# Patient Record
Sex: FEMALE | Race: WHITE | Employment: OTHER | URBAN - METROPOLITAN AREA
[De-identification: names, ages, dates, MRNs, and addresses within clinical notes are randomized per-mention and may not be internally consistent; named-entity substitution may affect disease eponyms.]

---

## 2017-01-26 RX ORDER — ESTRADIOL AND NORETHINDRONE ACETATE 1; .5 MG/1; MG/1
TABLET ORAL
Qty: 90 TABLET | Refills: 1 | Status: SHIPPED | OUTPATIENT
Start: 2017-01-26 | End: 2017-06-01 | Stop reason: SDUPTHER

## 2017-01-26 RX ORDER — VALACYCLOVIR HYDROCHLORIDE 500 MG/1
500 TABLET, FILM COATED ORAL DAILY
Qty: 90 TABLET | Refills: 1 | Status: SHIPPED | OUTPATIENT
Start: 2017-01-26 | End: 2017-06-01 | Stop reason: SDUPTHER

## 2017-01-30 RX ORDER — ESTRADIOL AND NORETHINDRONE ACETATE 1; .5 MG/1; MG/1
TABLET, FILM COATED ORAL
Qty: 28 TABLET | Refills: 5 | Status: SHIPPED | OUTPATIENT
Start: 2017-01-30 | End: 2017-06-01 | Stop reason: SDUPTHER

## 2017-06-01 ENCOUNTER — TELEPHONE (OUTPATIENT)
Dept: OBGYN | Age: 62
End: 2017-06-01

## 2017-06-01 ENCOUNTER — OFFICE VISIT (OUTPATIENT)
Dept: OBGYN | Age: 62
End: 2017-06-01

## 2017-06-01 VITALS
SYSTOLIC BLOOD PRESSURE: 118 MMHG | WEIGHT: 153 LBS | BODY MASS INDEX: 24.59 KG/M2 | HEIGHT: 66 IN | DIASTOLIC BLOOD PRESSURE: 72 MMHG

## 2017-06-01 DIAGNOSIS — Z12.31 SCREENING MAMMOGRAM, ENCOUNTER FOR: ICD-10-CM

## 2017-06-01 DIAGNOSIS — N84.1 CERVICAL POLYP: ICD-10-CM

## 2017-06-01 DIAGNOSIS — Z78.0 POSTMENOPAUSAL: ICD-10-CM

## 2017-06-01 DIAGNOSIS — Z01.419 WOMEN'S ANNUAL ROUTINE GYNECOLOGICAL EXAMINATION: Primary | ICD-10-CM

## 2017-06-01 DIAGNOSIS — Z11.51 SCREENING FOR HUMAN PAPILLOMAVIRUS: ICD-10-CM

## 2017-06-01 LAB — PAP SMEAR: NORMAL

## 2017-06-01 PROCEDURE — 99396 PREV VISIT EST AGE 40-64: CPT | Performed by: OBSTETRICS & GYNECOLOGY

## 2017-06-01 PROCEDURE — 11100 PR BIOPSY OF SKIN LESION: CPT | Performed by: OBSTETRICS & GYNECOLOGY

## 2017-06-01 RX ORDER — MONTELUKAST SODIUM 10 MG/1
TABLET ORAL
COMMUNITY
Start: 2017-05-30

## 2017-06-04 RX ORDER — ESTRADIOL AND NORETHINDRONE ACETATE 1; .5 MG/1; MG/1
TABLET ORAL
Qty: 90 TABLET | Refills: 3 | Status: SHIPPED | OUTPATIENT
Start: 2017-06-04 | End: 2018-06-05 | Stop reason: SDUPTHER

## 2017-06-04 RX ORDER — VALACYCLOVIR HYDROCHLORIDE 500 MG/1
500 TABLET, FILM COATED ORAL DAILY
Qty: 90 TABLET | Refills: 3 | Status: SHIPPED | OUTPATIENT
Start: 2017-06-04 | End: 2018-06-11 | Stop reason: SDUPTHER

## 2017-06-04 ASSESSMENT — ENCOUNTER SYMPTOMS
RESPIRATORY NEGATIVE: 1
ALLERGIC/IMMUNOLOGIC NEGATIVE: 1
RECTAL PAIN: 0
CONSTIPATION: 0
NAUSEA: 0
ABDOMINAL DISTENTION: 0
BLOOD IN STOOL: 0
ANAL BLEEDING: 0
DIARRHEA: 0
VOMITING: 0
EYES NEGATIVE: 1
ABDOMINAL PAIN: 0

## 2017-06-12 ENCOUNTER — HOSPITAL ENCOUNTER (OUTPATIENT)
Dept: WOMENS IMAGING | Age: 62
Discharge: HOME | End: 2017-06-12
Payer: COMMERCIAL

## 2017-06-12 DIAGNOSIS — Z12.31 SCREENING MAMMOGRAM, ENCOUNTER FOR: ICD-10-CM

## 2017-06-12 DIAGNOSIS — Z78.0 POSTMENOPAUSAL: ICD-10-CM

## 2017-06-12 PROCEDURE — 77080 DXA BONE DENSITY AXIAL: CPT

## 2017-06-12 PROCEDURE — G0202 SCR MAMMO BI INCL CAD: HCPCS

## 2017-06-13 DIAGNOSIS — R92.8 ABNORMAL MAMMOGRAM OF LEFT BREAST: Primary | ICD-10-CM

## 2017-06-14 ENCOUNTER — TELEPHONE (OUTPATIENT)
Dept: OBGYN | Age: 62
End: 2017-06-14

## 2017-06-16 ENCOUNTER — HOSPITAL ENCOUNTER (OUTPATIENT)
Dept: WOMENS IMAGING | Age: 62
Discharge: HOME | End: 2017-06-16
Payer: COMMERCIAL

## 2017-06-16 ENCOUNTER — HOSPITAL ENCOUNTER (OUTPATIENT)
Dept: ULTRASOUND IMAGING | Age: 62
End: 2017-06-16
Payer: COMMERCIAL

## 2017-06-16 ENCOUNTER — TELEPHONE (OUTPATIENT)
Dept: OBGYN | Age: 62
End: 2017-06-16

## 2017-06-16 DIAGNOSIS — R92.8 ABNORMAL MAMMOGRAM OF LEFT BREAST: Primary | ICD-10-CM

## 2017-06-16 DIAGNOSIS — R92.8 ABNORMAL MAMMOGRAM OF LEFT BREAST: ICD-10-CM

## 2017-06-16 PROCEDURE — G0206 DX MAMMO INCL CAD UNI: HCPCS

## 2017-08-03 ENCOUNTER — TELEPHONE (OUTPATIENT)
Dept: OBGYN | Age: 62
End: 2017-08-03

## 2018-06-05 ENCOUNTER — OFFICE VISIT (OUTPATIENT)
Dept: OBGYN CLINIC | Age: 63
End: 2018-06-05
Payer: COMMERCIAL

## 2018-06-05 VITALS
SYSTOLIC BLOOD PRESSURE: 118 MMHG | HEIGHT: 66 IN | WEIGHT: 157 LBS | DIASTOLIC BLOOD PRESSURE: 78 MMHG | BODY MASS INDEX: 25.23 KG/M2

## 2018-06-05 DIAGNOSIS — Z12.31 SCREENING MAMMOGRAM, ENCOUNTER FOR: ICD-10-CM

## 2018-06-05 DIAGNOSIS — Z01.419 VISIT FOR PELVIC EXAM: Primary | ICD-10-CM

## 2018-06-05 PROCEDURE — G8427 DOCREV CUR MEDS BY ELIG CLIN: HCPCS | Performed by: OBSTETRICS & GYNECOLOGY

## 2018-06-05 PROCEDURE — 3017F COLORECTAL CA SCREEN DOC REV: CPT | Performed by: OBSTETRICS & GYNECOLOGY

## 2018-06-05 PROCEDURE — 1036F TOBACCO NON-USER: CPT | Performed by: OBSTETRICS & GYNECOLOGY

## 2018-06-05 PROCEDURE — 99396 PREV VISIT EST AGE 40-64: CPT | Performed by: OBSTETRICS & GYNECOLOGY

## 2018-06-05 PROCEDURE — G8419 CALC BMI OUT NRM PARAM NOF/U: HCPCS | Performed by: OBSTETRICS & GYNECOLOGY

## 2018-06-05 PROCEDURE — 99213 OFFICE O/P EST LOW 20 MIN: CPT | Performed by: OBSTETRICS & GYNECOLOGY

## 2018-06-05 RX ORDER — ESTRADIOL AND NORETHINDRONE ACETATE 1; .5 MG/1; MG/1
TABLET ORAL
Qty: 90 TABLET | Refills: 3 | Status: SHIPPED | OUTPATIENT
Start: 2018-06-05

## 2018-06-05 ASSESSMENT — ENCOUNTER SYMPTOMS
VOMITING: 0
RECTAL PAIN: 0
ANAL BLEEDING: 0
ALLERGIC/IMMUNOLOGIC NEGATIVE: 1
ABDOMINAL PAIN: 0
EYES NEGATIVE: 1
CONSTIPATION: 0
ABDOMINAL DISTENTION: 0
NAUSEA: 0
RESPIRATORY NEGATIVE: 1
BLOOD IN STOOL: 0
DIARRHEA: 0

## 2018-06-11 RX ORDER — VALACYCLOVIR HYDROCHLORIDE 500 MG/1
TABLET, FILM COATED ORAL
Qty: 90 TABLET | Refills: 3 | Status: SHIPPED | OUTPATIENT
Start: 2018-06-11

## 2018-06-11 RX ORDER — ESTRADIOL AND NORETHINDRONE ACETATE 1; .5 MG/1; MG/1
TABLET, FILM COATED ORAL
Qty: 84 TABLET | Refills: 3 | Status: SHIPPED | OUTPATIENT
Start: 2018-06-11

## 2020-06-30 ENCOUNTER — OFFICE VISIT (OUTPATIENT)
Dept: OBGYN CLINIC | Age: 65
End: 2020-06-30
Payer: MEDICARE

## 2020-06-30 VITALS — BODY MASS INDEX: 24.69 KG/M2 | WEIGHT: 153 LBS | DIASTOLIC BLOOD PRESSURE: 84 MMHG | SYSTOLIC BLOOD PRESSURE: 124 MMHG

## 2020-06-30 PROCEDURE — 4040F PNEUMOC VAC/ADMIN/RCVD: CPT | Performed by: OBSTETRICS & GYNECOLOGY

## 2020-06-30 PROCEDURE — 99213 OFFICE O/P EST LOW 20 MIN: CPT | Performed by: OBSTETRICS & GYNECOLOGY

## 2020-06-30 PROCEDURE — 1090F PRES/ABSN URINE INCON ASSESS: CPT | Performed by: OBSTETRICS & GYNECOLOGY

## 2020-06-30 PROCEDURE — G8399 PT W/DXA RESULTS DOCUMENT: HCPCS | Performed by: OBSTETRICS & GYNECOLOGY

## 2020-06-30 PROCEDURE — 1123F ACP DISCUSS/DSCN MKR DOCD: CPT | Performed by: OBSTETRICS & GYNECOLOGY

## 2020-06-30 PROCEDURE — 1036F TOBACCO NON-USER: CPT | Performed by: OBSTETRICS & GYNECOLOGY

## 2020-06-30 PROCEDURE — G8420 CALC BMI NORM PARAMETERS: HCPCS | Performed by: OBSTETRICS & GYNECOLOGY

## 2020-06-30 PROCEDURE — G8427 DOCREV CUR MEDS BY ELIG CLIN: HCPCS | Performed by: OBSTETRICS & GYNECOLOGY

## 2020-06-30 PROCEDURE — 3017F COLORECTAL CA SCREEN DOC REV: CPT | Performed by: OBSTETRICS & GYNECOLOGY

## 2020-06-30 RX ORDER — PAROXETINE 7.5 MG/1
7.5 CAPSULE ORAL DAILY
Qty: 30 CAPSULE | Refills: 3 | Status: SHIPPED | OUTPATIENT
Start: 2020-06-30

## 2020-06-30 ASSESSMENT — ENCOUNTER SYMPTOMS
VOMITING: 0
ALLERGIC/IMMUNOLOGIC NEGATIVE: 1
NAUSEA: 0
ABDOMINAL PAIN: 0
ANAL BLEEDING: 0
EYES NEGATIVE: 1
ABDOMINAL DISTENTION: 0
RECTAL PAIN: 0
DIARRHEA: 0
RESPIRATORY NEGATIVE: 1
CONSTIPATION: 0
BLOOD IN STOOL: 0

## 2020-06-30 ASSESSMENT — PATIENT HEALTH QUESTIONNAIRE - PHQ9
1. LITTLE INTEREST OR PLEASURE IN DOING THINGS: 0
2. FEELING DOWN, DEPRESSED OR HOPELESS: 0
SUM OF ALL RESPONSES TO PHQ9 QUESTIONS 1 & 2: 0
SUM OF ALL RESPONSES TO PHQ QUESTIONS 1-9: 0
SUM OF ALL RESPONSES TO PHQ QUESTIONS 1-9: 0

## 2020-06-30 NOTE — PROGRESS NOTES
TABLET EVERY DAY 84 tablet 3    valACYclovir (VALTREX) 500 MG tablet TAKE 1 TABLET BY MOUTH DAILY 90 tablet 3    estradiol-norethindrone (MIMVEY) 1-0.5 MG per tablet TAKE 1 TABLET EVERY DAY 90 tablet 3    montelukast (SINGULAIR) 10 MG tablet       hydroxychloroquine (PLAQUENIL) 200 MG tablet Take 200 mg by mouth 2 times daily. 5    sulfaSALAzine (AZULFIDINE) 500 MG tablet Take 500 mg by mouth 2 times daily. 2 tablets twice a day  2    SYMBICORT 160-4.5 MCG/ACT AERO Inhale 2 puffs into the lungs 2 times daily. 2 puffs twice a day  6    thyroid (ARMOUR THYROID) 90 MG tablet Take 90 mg by mouth daily. 1 1/2 gr. Once a day      frovatriptan succinate (FROVA) 2.5 MG TABS Take 2.5 mg by mouth as needed for Migraine.  esomeprazole (NEXIUM) 40 MG capsule Take 40 mg by mouth every morning (before breakfast). 1 or 2 times a day      albuterol (PROVENTIL HFA;VENTOLIN HFA) 108 (90 BASE) MCG/ACT inhaler Inhale 2 puffs into the lungs every 4 hours as needed for Wheezing. As needed      propranolol (INDERAL LA) 80 MG CR capsule Take 80 mg by mouth 2 times daily. 1 capsule, exteneded release 24HR PO twice a day       No current facility-administered medications for this visit.       Social History     Socioeconomic History    Marital status:      Spouse name: Not on file    Number of children: Not on file    Years of education: Not on file    Highest education level: Not on file   Occupational History    Not on file   Social Needs    Financial resource strain: Not on file    Food insecurity     Worry: Not on file     Inability: Not on file    Transportation needs     Medical: Not on file     Non-medical: Not on file   Tobacco Use    Smoking status: Never Smoker    Smokeless tobacco: Never Used   Substance and Sexual Activity    Alcohol use: No    Drug use: No    Sexual activity: Not Currently   Lifestyle    Physical activity     Days per week: Not on file     Minutes per session: Not on file    Stress: Not on file   Relationships    Social connections     Talks on phone: Not on file     Gets together: Not on file     Attends Orthodox service: Not on file     Active member of club or organization: Not on file     Attends meetings of clubs or organizations: Not on file     Relationship status: Not on file    Intimate partner violence     Fear of current or ex partner: Not on file     Emotionally abused: Not on file     Physically abused: Not on file     Forced sexual activity: Not on file   Other Topics Concern    Not on file   Social History Narrative    Not on file        Family History   Problem Relation Age of Onset    Stroke Mother     COPD Father     Emphysema Father     No Known Problems Sister     No Known Problems Brother     No Known Problems Paternal Grandfather     No Known Problems Paternal Grandmother     No Known Problems Maternal Grandmother     No Known Problems Maternal Grandfather     No Known Problems Other     Breast Cancer Neg Hx     Cancer Neg Hx     Colon Cancer Neg Hx     Diabetes Neg Hx     Eclampsia Neg Hx     Hypertension Neg Hx     Ovarian Cancer Neg Hx      Labor Neg Hx     Spont Abortions Neg Hx        Review of Systems   Constitutional: Negative. Negative for activity change, appetite change, chills, diaphoresis, fatigue, fever and unexpected weight change. HENT: Negative. Eyes: Negative. Respiratory: Negative. Cardiovascular: Negative. Gastrointestinal: Negative for abdominal distention, abdominal pain, anal bleeding, blood in stool, constipation, diarrhea, nausea, rectal pain and vomiting. Endocrine: Negative. Genitourinary: Negative for decreased urine volume, difficulty urinating, dyspareunia, dysuria, enuresis, flank pain, frequency, genital sores, hematuria, menstrual problem, pelvic pain, urgency, vaginal bleeding, vaginal discharge and vaginal pain. Musculoskeletal: Negative. Skin: Negative. Allergic/Immunologic: Negative. Neurological: Negative. Hematological: Negative. Psychiatric/Behavioral: Negative. Objective:     Physical Exam  Constitutional:       General: She is not in acute distress. Appearance: She is well-developed. She is not diaphoretic. HENT:      Head: Normocephalic and atraumatic. Eyes:      Conjunctiva/sclera: Conjunctivae normal.   Neck:      Musculoskeletal: Normal range of motion and neck supple. Thyroid: No thyromegaly. Cardiovascular:      Rate and Rhythm: Normal rate and regular rhythm. Heart sounds: Normal heart sounds. Pulmonary:      Effort: Pulmonary effort is normal. No respiratory distress. Breath sounds: Normal breath sounds. No wheezing or rales. Chest:      Chest wall: No tenderness. Breasts:         Right: No mass, nipple discharge, skin change or tenderness. Left: No mass, nipple discharge, skin change or tenderness. Abdominal:      General: Bowel sounds are normal. There is no distension. Palpations: Abdomen is soft. There is no mass. Tenderness: There is no abdominal tenderness. There is no guarding or rebound. Hernia: There is no hernia in the right inguinal area or left inguinal area. Genitourinary:     Labia:         Right: No rash, tenderness, lesion or injury. Left: No rash, tenderness, lesion or injury. Vagina: Normal. No signs of injury and foreign body. No vaginal discharge, erythema, tenderness or bleeding. Cervix: No cervical motion tenderness, discharge or friability. Uterus: Not deviated, not enlarged, not fixed and not tender. Adnexa:         Right: No mass, tenderness or fullness. Left: No mass, tenderness or fullness. Rectum: Normal.   Musculoskeletal: Normal range of motion. General: No tenderness or deformity. Lymphadenopathy:      Cervical: No cervical adenopathy. Skin:     General: Skin is warm and dry.

## 2020-07-01 ENCOUNTER — TELEPHONE (OUTPATIENT)
Dept: OBGYN CLINIC | Age: 65
End: 2020-07-01

## 2020-07-01 NOTE — TELEPHONE ENCOUNTER
----- Message from Octaviano Osler, MD sent at 7/1/2020  1:23 PM EDT -----  Abnormal TSH. Needs to see PCP ASAP.

## 2020-07-03 ENCOUNTER — PATIENT MESSAGE (OUTPATIENT)
Dept: OBGYN CLINIC | Age: 65
End: 2020-07-03

## 2020-07-06 NOTE — TELEPHONE ENCOUNTER
From: Bharti Escobar  To: Claudetta Lazier, MD  Sent: 7/3/2020 9:29 AM EDT  Subject: Test Results Question    Dr. Cindy Flood,  My thyroid test results were faxed over to my family doctor, Dr. Franklin Álvarez because your office called me & said my results are elevated (8.620). However, Dr. Nolvia Mclean office called me back & said the TSH is high but they refer to the T4 result & that one was normal, so there is nothing to worry about since that one is in the normal range. I was hoping that was the reason for me having so many hot flashes & he said that has nothing with having hot flashes. I am a bit confused that I will not be put on any medication to lower the elevation. In addition, the anxiety med you prescribed for me to try (Paroxetine Mesylate) is not available, so they will contact me when it is ready. So far, I have not heard back from them. Please advise.  Thx!

## 2020-07-07 NOTE — TELEPHONE ENCOUNTER
Please let patient know I have to defer all decisions and recommendations on thyroid function and meds to PCP because this is not my specialty. Those questions would need to be directed to her PCP. Sorry to hear meds not available; Covid has slowed production of many meds and hopefully it will be available soon.   Thanks

## 2023-03-15 ENCOUNTER — TELEPHONE (OUTPATIENT)
Dept: PRIMARY CARE | Facility: CLINIC | Age: 68
End: 2023-03-15

## 2023-03-15 NOTE — TELEPHONE ENCOUNTER
Letter to insurance company said that they code amendment fax [phone]  Date of services 12/9/22 for phlebotomy charge, comprehensive panel, TSH, uranalysis.    Thank you

## 2023-03-17 NOTE — TELEPHONE ENCOUNTER
Pt called saying that the lab work she had done was coded wrong, ended up paying when she shouldn't have.  Her insurance told her that they needed a letter faxed to them with the correct codes for phlebotomy, comprehensive panel, TSH and uranalysis from 12/9/22. When her insurance has the fax they told her they would fix it.

## 2023-03-23 NOTE — TELEPHONE ENCOUNTER
Patient said that is was denied because it was coded as routine. They need to be individual diagnoses codes for each test.

## 2023-05-01 ENCOUNTER — TELEPHONE (OUTPATIENT)
Dept: PRIMARY CARE | Facility: CLINIC | Age: 68
End: 2023-05-01

## 2023-05-01 NOTE — TELEPHONE ENCOUNTER
Patient calling back stating that the lab work billing hasn't been corrected yet. Patient gave the fax for lab billing [phone] to send the correction.  Thank you.

## 2023-05-08 NOTE — TELEPHONE ENCOUNTER
Pt calling a second time about this. She wants this fixed because it is about to be sent to collections. Can you please look into this?

## 2023-05-28 DIAGNOSIS — M32.9 SYSTEMIC LUPUS ERYTHEMATOSUS, UNSPECIFIED (MULTI): ICD-10-CM

## 2023-05-30 RX ORDER — SULFASALAZINE 500 MG/1
TABLET ORAL
Qty: 120 TABLET | Refills: 0 | Status: SHIPPED | OUTPATIENT
Start: 2023-05-30 | End: 2023-06-26 | Stop reason: SDUPTHER

## 2023-06-13 ENCOUNTER — HOSPITAL ENCOUNTER (OUTPATIENT)
Dept: DATA CONVERSION | Facility: HOSPITAL | Age: 68
End: 2023-06-13
Attending: INTERNAL MEDICINE | Admitting: INTERNAL MEDICINE
Payer: MEDICARE

## 2023-06-13 DIAGNOSIS — K21.9 GASTRO-ESOPHAGEAL REFLUX DISEASE WITHOUT ESOPHAGITIS: ICD-10-CM

## 2023-06-13 DIAGNOSIS — M41.9 SCOLIOSIS, UNSPECIFIED: ICD-10-CM

## 2023-06-13 DIAGNOSIS — G25.81 RESTLESS LEGS SYNDROME: ICD-10-CM

## 2023-06-13 DIAGNOSIS — E03.9 HYPOTHYROIDISM, UNSPECIFIED: ICD-10-CM

## 2023-06-13 DIAGNOSIS — M34.9 SYSTEMIC SCLEROSIS, UNSPECIFIED (MULTI): ICD-10-CM

## 2023-06-13 DIAGNOSIS — Z98.890 OTHER SPECIFIED POSTPROCEDURAL STATES: ICD-10-CM

## 2023-06-13 DIAGNOSIS — I73.00 RAYNAUD'S SYNDROME WITHOUT GANGRENE: ICD-10-CM

## 2023-06-13 DIAGNOSIS — Z86.19 PERSONAL HISTORY OF OTHER INFECTIOUS AND PARASITIC DISEASES: ICD-10-CM

## 2023-06-13 DIAGNOSIS — R91.8 OTHER NONSPECIFIC ABNORMAL FINDING OF LUNG FIELD: ICD-10-CM

## 2023-06-13 DIAGNOSIS — R13.10 DYSPHAGIA, UNSPECIFIED: ICD-10-CM

## 2023-06-13 DIAGNOSIS — G20.A1 PARKINSON'S DISEASE (MULTI): ICD-10-CM

## 2023-06-13 DIAGNOSIS — K58.1 IRRITABLE BOWEL SYNDROME WITH CONSTIPATION: ICD-10-CM

## 2023-06-13 DIAGNOSIS — G43.909 MIGRAINE, UNSPECIFIED, NOT INTRACTABLE, WITHOUT STATUS MIGRAINOSUS: ICD-10-CM

## 2023-06-13 DIAGNOSIS — M32.9 SYSTEMIC LUPUS ERYTHEMATOSUS, UNSPECIFIED (MULTI): ICD-10-CM

## 2023-06-21 PROBLEM — R14.0 ABDOMINAL BLOATING: Status: ACTIVE | Noted: 2023-06-21

## 2023-06-21 PROBLEM — S40.012D: Status: ACTIVE | Noted: 2023-06-21

## 2023-06-21 PROBLEM — R91.8 PULMONARY NODULES: Status: ACTIVE | Noted: 2023-06-21

## 2023-06-21 PROBLEM — M41.9 SCOLIOSIS: Status: ACTIVE | Noted: 2023-06-21

## 2023-06-21 PROBLEM — G25.81 RLS (RESTLESS LEGS SYNDROME): Status: ACTIVE | Noted: 2023-06-21

## 2023-06-21 PROBLEM — K21.9 GASTROESOPHAGEAL REFLUX DISEASE: Status: ACTIVE | Noted: 2023-06-21

## 2023-06-21 PROBLEM — M99.05 SOMATIC DYSFUNCTION OF PELVIC REGION: Status: ACTIVE | Noted: 2023-06-21

## 2023-06-21 PROBLEM — N32.81 OVERACTIVE BLADDER: Status: ACTIVE | Noted: 2023-06-21

## 2023-06-21 PROBLEM — R13.10 DYSPHAGIA: Status: ACTIVE | Noted: 2023-06-21

## 2023-06-21 PROBLEM — U07.1 COVID-19 VIRUS INFECTION: Status: ACTIVE | Noted: 2023-06-21

## 2023-06-21 PROBLEM — R55 VASOMOTOR INSTABILITY: Status: ACTIVE | Noted: 2023-06-21

## 2023-06-21 PROBLEM — S29.019A STRAIN OF THORACIC REGION: Status: ACTIVE | Noted: 2023-06-21

## 2023-06-21 PROBLEM — R00.2 PALPITATIONS: Status: ACTIVE | Noted: 2023-06-21

## 2023-06-21 PROBLEM — D25.9 FIBROID, UTERINE: Status: ACTIVE | Noted: 2023-06-21

## 2023-06-21 PROBLEM — R60.0 BILATERAL EDEMA OF LOWER EXTREMITY: Status: ACTIVE | Noted: 2023-06-21

## 2023-06-21 PROBLEM — I73.00 RAYNAUD'S SYNDROME: Status: ACTIVE | Noted: 2023-06-21

## 2023-06-21 PROBLEM — E55.9 VITAMIN D DEFICIENCY: Status: ACTIVE | Noted: 2023-06-21

## 2023-06-21 PROBLEM — M32.9 SYSTEMIC LUPUS ERYTHEMATOSUS (MULTI): Status: ACTIVE | Noted: 2023-06-21

## 2023-06-21 PROBLEM — B96.89 ACUTE BACTERIAL BRONCHITIS: Status: ACTIVE | Noted: 2023-06-21

## 2023-06-21 PROBLEM — M99.03 SOMATIC DYSFUNCTION OF LUMBOSACRAL REGION: Status: ACTIVE | Noted: 2023-06-21

## 2023-06-21 PROBLEM — M34.9 SCLERODERMA (MULTI): Status: ACTIVE | Noted: 2023-06-21

## 2023-06-21 PROBLEM — J20.8 ACUTE BACTERIAL BRONCHITIS: Status: ACTIVE | Noted: 2023-06-21

## 2023-06-21 PROBLEM — S40.022D: Status: ACTIVE | Noted: 2023-06-21

## 2023-06-21 PROBLEM — J47.9 BRONCHIECTASIS (MULTI): Status: ACTIVE | Noted: 2023-06-21

## 2023-06-21 PROBLEM — G89.29 CHRONIC LOW BACK PAIN: Status: ACTIVE | Noted: 2023-06-21

## 2023-06-21 PROBLEM — S29.019A THORACIC MYOFASCIAL STRAIN: Status: ACTIVE | Noted: 2023-06-21

## 2023-06-21 PROBLEM — M99.01 CERVICAL SOMATIC DYSFUNCTION: Status: ACTIVE | Noted: 2023-06-21

## 2023-06-21 PROBLEM — M54.50 CHRONIC LOW BACK PAIN: Status: ACTIVE | Noted: 2023-06-21

## 2023-06-21 PROBLEM — M19.90 ARTHRITIS: Status: ACTIVE | Noted: 2023-06-21

## 2023-06-21 PROBLEM — G43.009 MIGRAINE WITHOUT AURA AND WITHOUT STATUS MIGRAINOSUS, NOT INTRACTABLE: Status: ACTIVE | Noted: 2023-06-21

## 2023-06-21 PROBLEM — M35.1 MCTD (MIXED CONNECTIVE TISSUE DISEASE) (MULTI): Status: ACTIVE | Noted: 2023-06-21

## 2023-06-21 PROBLEM — N95.2 ATROPHIC VAGINITIS: Status: ACTIVE | Noted: 2023-06-21

## 2023-06-21 PROBLEM — E78.5 BORDERLINE HYPERLIPIDEMIA: Status: ACTIVE | Noted: 2023-06-21

## 2023-06-21 PROBLEM — J32.9 CHRONIC SINUSITIS: Status: ACTIVE | Noted: 2023-06-21

## 2023-06-21 PROBLEM — K44.9 HERNIA, HIATAL: Status: ACTIVE | Noted: 2023-06-21

## 2023-06-21 PROBLEM — I87.2 VENOUS INSUFFICIENCY (CHRONIC) (PERIPHERAL): Status: ACTIVE | Noted: 2023-06-21

## 2023-06-21 PROBLEM — M54.9 BACK PAIN, CHRONIC: Status: ACTIVE | Noted: 2023-06-21

## 2023-06-21 PROBLEM — K59.09 CHRONIC CONSTIPATION: Status: ACTIVE | Noted: 2023-06-21

## 2023-06-21 PROBLEM — D83.9: Status: ACTIVE | Noted: 2023-06-21

## 2023-06-21 PROBLEM — S20.212S: Status: ACTIVE | Noted: 2023-06-21

## 2023-06-21 PROBLEM — M81.0 SENILE OSTEOPOROSIS: Status: ACTIVE | Noted: 2023-06-21

## 2023-06-21 PROBLEM — R05.3 CHRONIC COUGH: Status: ACTIVE | Noted: 2023-06-21

## 2023-06-21 PROBLEM — G89.29 BACK PAIN, CHRONIC: Status: ACTIVE | Noted: 2023-06-21

## 2023-06-21 RX ORDER — IMMUNE GLOBULIN INFUSION (HUMAN) 100 MG/ML
INJECTION, SOLUTION INTRAVENOUS; SUBCUTANEOUS
COMMUNITY

## 2023-06-21 RX ORDER — IBUPROFEN 600 MG/1
600 TABLET ORAL
COMMUNITY
End: 2024-01-08 | Stop reason: SDUPTHER

## 2023-06-21 RX ORDER — PRAMIPEXOLE DIHYDROCHLORIDE 0.5 MG/1
0.5 TABLET ORAL 3 TIMES DAILY
COMMUNITY
End: 2024-01-15 | Stop reason: SDUPTHER

## 2023-06-21 RX ORDER — PROPRANOLOL HYDROCHLORIDE 80 MG/1
80 CAPSULE, EXTENDED RELEASE ORAL DAILY
COMMUNITY
Start: 2023-03-24 | End: 2023-06-26 | Stop reason: SDUPTHER

## 2023-06-21 RX ORDER — ESTRADIOL 0.1 MG/G
CREAM VAGINAL
COMMUNITY
Start: 2022-12-13 | End: 2023-06-26 | Stop reason: ALTCHOICE

## 2023-06-21 RX ORDER — OMEPRAZOLE 40 MG/1
40 CAPSULE, DELAYED RELEASE ORAL DAILY
COMMUNITY
Start: 2022-12-13 | End: 2024-01-08 | Stop reason: ALTCHOICE

## 2023-06-21 RX ORDER — NIFEDIPINE 30 MG/1
30 TABLET, FILM COATED, EXTENDED RELEASE ORAL DAILY
COMMUNITY
End: 2023-06-26 | Stop reason: SDUPTHER

## 2023-06-21 RX ORDER — HYDROXYCHLOROQUINE SULFATE 200 MG/1
200 TABLET, FILM COATED ORAL DAILY
COMMUNITY
End: 2023-06-26 | Stop reason: SDUPTHER

## 2023-06-21 RX ORDER — RIZATRIPTAN BENZOATE 10 MG/1
10 TABLET ORAL
COMMUNITY
End: 2023-06-26 | Stop reason: SDUPTHER

## 2023-06-26 ENCOUNTER — OFFICE VISIT (OUTPATIENT)
Dept: PRIMARY CARE | Facility: CLINIC | Age: 68
End: 2023-06-26
Payer: MEDICARE

## 2023-06-26 VITALS
HEIGHT: 65 IN | BODY MASS INDEX: 27.66 KG/M2 | HEART RATE: 64 BPM | WEIGHT: 166 LBS | SYSTOLIC BLOOD PRESSURE: 120 MMHG | DIASTOLIC BLOOD PRESSURE: 68 MMHG

## 2023-06-26 DIAGNOSIS — R13.10 DYSPHAGIA, UNSPECIFIED TYPE: ICD-10-CM

## 2023-06-26 DIAGNOSIS — R53.83 FATIGUE, UNSPECIFIED TYPE: ICD-10-CM

## 2023-06-26 DIAGNOSIS — M34.9 SCLERODERMA (MULTI): ICD-10-CM

## 2023-06-26 DIAGNOSIS — K59.09 CHRONIC CONSTIPATION: ICD-10-CM

## 2023-06-26 DIAGNOSIS — E55.9 VITAMIN D DEFICIENCY: ICD-10-CM

## 2023-06-26 DIAGNOSIS — M41.35 THORACOGENIC SCOLIOSIS OF THORACOLUMBAR REGION: ICD-10-CM

## 2023-06-26 DIAGNOSIS — N32.81 OVERACTIVE BLADDER: ICD-10-CM

## 2023-06-26 DIAGNOSIS — I73.00 RAYNAUD'S DISEASE WITHOUT GANGRENE: ICD-10-CM

## 2023-06-26 DIAGNOSIS — J47.0 BRONCHIECTASIS WITH ACUTE LOWER RESPIRATORY INFECTION (MULTI): ICD-10-CM

## 2023-06-26 DIAGNOSIS — M32.9 SYSTEMIC LUPUS ERYTHEMATOSUS, UNSPECIFIED (MULTI): ICD-10-CM

## 2023-06-26 DIAGNOSIS — G43.009 MIGRAINE WITHOUT AURA AND WITHOUT STATUS MIGRAINOSUS, NOT INTRACTABLE: ICD-10-CM

## 2023-06-26 DIAGNOSIS — Z00.00 MEDICARE ANNUAL WELLNESS VISIT, SUBSEQUENT: ICD-10-CM

## 2023-06-26 DIAGNOSIS — R05.3 CHRONIC COUGH: ICD-10-CM

## 2023-06-26 DIAGNOSIS — M35.1 MCTD (MIXED CONNECTIVE TISSUE DISEASE) (MULTI): ICD-10-CM

## 2023-06-26 DIAGNOSIS — Z00.00 ENCOUNTER FOR HEALTH MAINTENANCE EXAMINATION: Primary | ICD-10-CM

## 2023-06-26 DIAGNOSIS — K21.9 GASTROESOPHAGEAL REFLUX DISEASE WITHOUT ESOPHAGITIS: ICD-10-CM

## 2023-06-26 DIAGNOSIS — Z13.220 SCREENING, LIPID: ICD-10-CM

## 2023-06-26 DIAGNOSIS — M81.0 AGE-RELATED OSTEOPOROSIS WITHOUT CURRENT PATHOLOGICAL FRACTURE: ICD-10-CM

## 2023-06-26 PROBLEM — U07.1 COVID-19 VIRUS INFECTION: Status: RESOLVED | Noted: 2023-06-21 | Resolved: 2023-06-26

## 2023-06-26 PROCEDURE — 99497 ADVNCD CARE PLAN 30 MIN: CPT | Performed by: FAMILY MEDICINE

## 2023-06-26 PROCEDURE — 1125F AMNT PAIN NOTED PAIN PRSNT: CPT | Performed by: FAMILY MEDICINE

## 2023-06-26 PROCEDURE — G0446 INTENS BEHAVE THER CARDIO DX: HCPCS | Performed by: FAMILY MEDICINE

## 2023-06-26 PROCEDURE — 1159F MED LIST DOCD IN RCRD: CPT | Performed by: FAMILY MEDICINE

## 2023-06-26 PROCEDURE — 99215 OFFICE O/P EST HI 40 MIN: CPT | Performed by: FAMILY MEDICINE

## 2023-06-26 PROCEDURE — G0439 PPPS, SUBSEQ VISIT: HCPCS | Performed by: FAMILY MEDICINE

## 2023-06-26 PROCEDURE — 93000 ELECTROCARDIOGRAM COMPLETE: CPT | Performed by: FAMILY MEDICINE

## 2023-06-26 PROCEDURE — 1157F ADVNC CARE PLAN IN RCRD: CPT | Performed by: FAMILY MEDICINE

## 2023-06-26 PROCEDURE — 1160F RVW MEDS BY RX/DR IN RCRD: CPT | Performed by: FAMILY MEDICINE

## 2023-06-26 PROCEDURE — 1036F TOBACCO NON-USER: CPT | Performed by: FAMILY MEDICINE

## 2023-06-26 PROCEDURE — 1170F FXNL STATUS ASSESSED: CPT | Performed by: FAMILY MEDICINE

## 2023-06-26 RX ORDER — NIFEDIPINE 30 MG/1
30 TABLET, FILM COATED, EXTENDED RELEASE ORAL DAILY
Qty: 90 TABLET | Refills: 2 | Status: SHIPPED | OUTPATIENT
Start: 2023-06-26 | End: 2024-01-08 | Stop reason: SDUPTHER

## 2023-06-26 RX ORDER — PROPRANOLOL HYDROCHLORIDE 80 MG/1
80 CAPSULE, EXTENDED RELEASE ORAL DAILY
Qty: 90 CAPSULE | Refills: 2 | Status: SHIPPED | OUTPATIENT
Start: 2023-06-26 | End: 2024-01-08 | Stop reason: SDUPTHER

## 2023-06-26 RX ORDER — RIZATRIPTAN BENZOATE 10 MG/1
TABLET ORAL
Qty: 27 TABLET | Refills: 2 | Status: SHIPPED | OUTPATIENT
Start: 2023-06-26 | End: 2024-01-08 | Stop reason: SDUPTHER

## 2023-06-26 RX ORDER — SULFASALAZINE 500 MG/1
1000 TABLET ORAL DAILY
Qty: 180 TABLET | Refills: 2 | Status: SHIPPED | OUTPATIENT
Start: 2023-06-26 | End: 2024-01-08 | Stop reason: SDUPTHER

## 2023-06-26 RX ORDER — PRAMIPEXOLE DIHYDROCHLORIDE 1 MG/1
1 TABLET ORAL NIGHTLY
COMMUNITY
Start: 2023-06-15 | End: 2024-01-15 | Stop reason: SDUPTHER

## 2023-06-26 RX ORDER — HYDROXYCHLOROQUINE SULFATE 200 MG/1
200 TABLET, FILM COATED ORAL DAILY
Qty: 90 TABLET | Refills: 2 | Status: SHIPPED | OUTPATIENT
Start: 2023-06-26 | End: 2024-01-08 | Stop reason: SDUPTHER

## 2023-06-26 ASSESSMENT — ACTIVITIES OF DAILY LIVING (ADL)
DOING_HOUSEWORK: INDEPENDENT
DRESSING: INDEPENDENT
GROCERY_SHOPPING: INDEPENDENT
BATHING: INDEPENDENT
TAKING_MEDICATION: INDEPENDENT
MANAGING_FINANCES: INDEPENDENT

## 2023-06-26 ASSESSMENT — PATIENT HEALTH QUESTIONNAIRE - PHQ9
2. FEELING DOWN, DEPRESSED OR HOPELESS: NOT AT ALL
SUM OF ALL RESPONSES TO PHQ9 QUESTIONS 1 & 2: 0
1. LITTLE INTEREST OR PLEASURE IN DOING THINGS: NOT AT ALL

## 2023-06-26 NOTE — PROGRESS NOTES
Annual Comprehensive Medical Exam    67 y.o. female presents for annual comprehensive medical evaluation and preventive services screening.  No recent hospitalizations, surgeries or significant injuries.    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  Taking Plaquenil and Azulfidine   - Rheumatologist had retired.  Patient was stable with Plaquenil and Azulfidine.  I had agreed to continue prescribing these medications but she would find another rheumatologist if there is a flareup of condition.    Breast CA screening 2/2023  DEXA 2021  Colon cancer screening by gastroenterology    Migraine cephalgia is well controlled with Inderal LA daily.  She uses less than 9 Maxalt per month for abortive therapy.        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)     Anti-pneumococcal polysaccharide antibody deficiency    COVID-19 09/21/2022    COVID-19 virus infection    Dysphagia, unspecified     Dysphagia, idiopathic    Encounter for screening for malignant neoplasm of cervix     Pap smear for cervical cancer screening    Localized edema 05/04/2022    Bilateral edema of lower extremity    Parkinson's disease (CMS/HCC) 06/21/2022    Parkinsonism    Personal history of other diseases of the digestive system 07/18/2018    History of hiatal hernia    Personal history of other diseases of the musculoskeletal system and connective tissue     History of osteoarthritis    Personal history of other diseases of the respiratory system 10/08/2017    History of asthma    Personal history of other endocrine, nutritional and metabolic disease 08/08/2016    History of hyperlipidemia    Personal history of other endocrine, nutritional and metabolic disease 10/08/2017    History of hypothyroidism    Personal history of other malignant neoplasm of skin     History of malignant neoplasm of skin    Personal history of  other medical treatment     History of screening mammography    Personal history of pneumonia (recurrent) 03/18/2020    History of pneumonia due to Pseudomonas    Personal history of pneumonia (recurrent) 01/29/2021    History of pneumonia due to Pseudomonas    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    Raynaud's syndrome without gangrene     Raynaud disease      Past Surgical History:   Procedure Laterality Date    BREAST LUMPECTOMY  06/23/2017    Right Breast Lumpectomy    COLONOSCOPY  07/29/2015    Complete Colonoscopy    GASTRIC FUNDOPLICATION  07/19/2018    Esophagogastric Fundoplasty Nissen Fundoplication    OTHER SURGICAL HISTORY  02/19/2015    Treatment Of Jaw    OTHER SURGICAL HISTORY  06/06/2019    Nissen fundoplication laparoscopic    OTHER SURGICAL HISTORY  06/23/2017    Biopsy Pleural    OTHER SURGICAL HISTORY  06/23/2017    Shoulder Repair    SINUS SURGERY  06/23/2017    Sinus Surgery    TONSILLECTOMY  06/23/2017    Tonsillectomy     Family History   Problem Relation Name Age of Onset    Other (cerebrovascular accident) Mother      COPD Father      Prostate cancer Brother        Social History     Socioeconomic History    Marital status:      Spouse name: Not on file    Number of children: Not on file    Years of education: Not on file    Highest education level: Not on file   Occupational History    Not on file   Tobacco Use    Smoking status: Never     Passive exposure: Never    Smokeless tobacco: Never   Substance and Sexual Activity    Alcohol use: Not Currently    Drug use: Never    Sexual activity: Not on file   Other Topics Concern    Not on file   Social History Narrative    Not on file     Social Determinants of Health     Financial Resource Strain: Not on file   Food Insecurity: Not on file   Transportation Needs: Not on file   Physical Activity: Not on file   Stress: Not on file   Social Connections: Not on file   Intimate Partner  "Violence: Not on file   Housing Stability: Not on file       Current Outpatient Medications on File Prior to Visit   Medication Sig Dispense Refill    hydroxychloroquine (Plaquenil) 200 mg tablet Take 1 tablet (200 mg) by mouth once daily.      ibuprofen 600 mg tablet Take 1 tablet (600 mg) by mouth.      immune globulin, human, (Gammagard Liquid) infusion Infuse monthly as directed      linaCLOtide (Linzess) 145 mcg capsule Take 2 capsules (290 mcg) by mouth once daily.      NIFEdipine CC 30 mg 24 hr tablet Take 1 tablet (30 mg) by mouth once daily.      omeprazole (PriLOSEC) 40 mg DR capsule Take 1 capsule (40 mg) by mouth once daily.      pramipexole (Mirapex) 0.5 mg tablet Take 1 tablet (0.5 mg) by mouth 3 times a day.      pramipexole (Mirapex) 1 mg tablet Take 1 tablet (1 mg) by mouth once daily at bedtime.      propranolol LA (Inderal LA) 80 mg 24 hr capsule Take 1 capsule (80 mg) by mouth once daily.      rizatriptan (Maxalt) 10 mg tablet Take 1 tablet (10 mg) by mouth.      sulfaSALAzine (Azulfidine) 500 mg tablet TAKE 2 TABLETS BY MOUTH TWICE A DAY (Patient taking differently: 2 tabs once a day) 120 tablet 0    [DISCONTINUED] estradiol (Estrace) 0.01 % (0.1 mg/gram) vaginal cream Insert into the vagina.      [DISCONTINUED] propranolol XL (Innopran XL) 80 mg 24 hr capsule Take 1 capsule (80 mg) by mouth once daily.       No current facility-administered medications on file prior to visit.       Allergies   Allergen Reactions    Doxycycline Other and Unknown    Topiramate Other, Nausea Only, Palpitations and Unknown       Complete review of systems is negative today except for that mentioned in the history of present illness.  In particular patient denies chest pain, shortness of breath, headaches and GI disturbances.      Visit Vitals  /68   Pulse 64   Ht 1.651 m (5' 5\")   Wt 75.3 kg (166 lb)   BMI 27.62 kg/m²   Smoking Status Never   BSA 1.86 m²      Physical Exam  Gen.: Alert and oriented ×3 female " in no acute distress.  HEENT: Head is normocephalic.  Pupils equal and reactive to light.  Neck is supple without adenopathy or carotid bruits.  No masses or thyromegaly  Heart: Regular rate and rhythm without murmurs.  Lungs: Clear to auscultation bilaterally.  Extremities: Good range of motion of all joints.  No significant edema. Pedal pulses +1-2/4  Skin: No significant or irregular nevi visualized.  Neuro: No signs of focal neurologic deficit.  No tremor.  Speech and hearing are normal.  DTRs +3/4;  Muscle Strength +5/5.  Musculoskeletal: Spine with good ROM.  Mild visible thoracolumbar scoliosis.  Leg lengths are equal.  Psych: normal affect.  No suicidal ideation.  Good judgement and insight.     The 10-year ASCVD risk score (Georges URBAN, et al., 2019) is: 6.6%    Values used to calculate the score:      Age: 67 years      Sex: Female      Is Non- : No      Diabetic: No      Tobacco smoker: No      Systolic Blood Pressure: 120 mmHg      Is BP treated: No      HDL Cholesterol: 60.2 mg/dL      Total Cholesterol: 197 mg/dL        Diagnosis/Plan  1. Encounter for health maintenance examination  - Comprehensive Metabolic Panel; Future  - CBC; Future  - Lipid Panel; Future  - TSH with reflex to Free T4 if abnormal; Future  - Vitamin D 1,25 Dihydroxy; Future  - Urinalysis with Reflex Microscopic; Future  - ECG 12 lead (Clinic Performed)    2. Medicare annual wellness visit, subsequent  Living Will / Advanced Care Planning:  Discussed advance care planning including explanation and discussion of advanced directives.  Patient does have current up-to-date living will but does not have a power of .  Currently at her  is power of  but patient does not have documents.       3. MCTD (mixed connective tissue disease) (CMS/[facility])  - hydroxychloroquine (Plaquenil) 200 mg tablet; Take 1 tablet (200 mg) by mouth once daily.  Dispense: 90 tablet; Refill: 2    6. Vitamin D deficiency  -  Vitamin D 1,25 Dihydroxy; Future    8. Systemic lupus erythematosus, unspecified (CMS/HCC)  Continue management with Plaquenil and Azulfidine (mixed connective tissue disorder  - sulfaSALAzine (Azulfidine) 500 mg tablet; Take 2 tablets (1,000 mg) by mouth once daily. 2 tabs once a day  Dispense: 180 tablet; Refill: 2    9. Bronchiectasis with acute lower respiratory infection (CMS/[facility])  Recommend pulmonology consultation.  Patient is not using any inhalers at this time.    10. Chronic cough    11. Raynaud's disease without gangrene  - NIFEdipine CC 30 mg 24 hr tablet; Take 1 tablet (30 mg) by mouth once daily.  Dispense: 90 tablet; Refill: 2    12. Chronic constipation  Continue management by gastroenterology    13. Gastroesophageal reflux disease without esophagitis  Continue management by gastroenterology    14. Dysphagia, unspecified type  Continue management by gastroenterology    15. Overactive bladder    16. Thoracogenic scoliosis of thoracolumbar region    17. Scleroderma (CMS/HCC)  Continue Plaquenil and Azulfidine (mixed connective tissue disease)    18. Age-related osteoporosis without current pathological fracture  - XR DEXA bone density; Future    19. Migraine without aura and without status migrainosus, not intractable  - propranolol LA (Inderal LA) 80 mg 24 hr capsule; Take 1 capsule (80 mg) by mouth once daily.  Dispense: 90 capsule; Refill: 2  - rizatriptan (Maxalt) 10 mg tablet; One po at onset of migraine; can repeat in 2 hrs;  max 20mg daily  Dispense: 27 tablet; Refill: 2        Follow up in 6 months for medical management    I will continue to monitor, evaluate, assess and treat all problems/diagnoses as appropriate and continue to collaborate with specialists.    Contact office or send a  MY Chart message with any questions or concerns    Encouraged to sign up with my  My Chart  Patient will only be notified of labs that require medical intervention.    Prescriptions will not be filled  unless you are compliant with your follow up appointments or have a follow up appointment scheduled as per instruction of your physician. Refills should be requested at the time of your visit.    **Charting was completed using voice recognition technology and may include unintended errors**    Miguel Alvarado DO, [facility]  [address]  [place] [address] [phone]

## 2023-07-28 ENCOUNTER — INITIAL CONSULT (OUTPATIENT)
Dept: SPORTS MEDICINE | Age: 68
End: 2023-07-28
Payer: MEDICARE

## 2023-07-28 VITALS
HEIGHT: 65 IN | TEMPERATURE: 96.3 F | SYSTOLIC BLOOD PRESSURE: 102 MMHG | DIASTOLIC BLOOD PRESSURE: 62 MMHG | WEIGHT: 165 LBS | BODY MASS INDEX: 27.49 KG/M2

## 2023-07-28 DIAGNOSIS — M47.816 SPONDYLOSIS OF LUMBAR REGION WITHOUT MYELOPATHY OR RADICULOPATHY: ICD-10-CM

## 2023-07-28 DIAGNOSIS — S33.6XXA SPRAIN OF SACROILIAC LIGAMENT, INITIAL ENCOUNTER: ICD-10-CM

## 2023-07-28 DIAGNOSIS — M47.814 SPONDYLOSIS OF THORACIC REGION WITHOUT MYELOPATHY OR RADICULOPATHY: Primary | ICD-10-CM

## 2023-07-28 PROCEDURE — 1036F TOBACCO NON-USER: CPT | Performed by: FAMILY MEDICINE

## 2023-07-28 PROCEDURE — 1123F ACP DISCUSS/DSCN MKR DOCD: CPT | Performed by: FAMILY MEDICINE

## 2023-07-28 PROCEDURE — 3017F COLORECTAL CA SCREEN DOC REV: CPT | Performed by: FAMILY MEDICINE

## 2023-07-28 PROCEDURE — G8399 PT W/DXA RESULTS DOCUMENT: HCPCS | Performed by: FAMILY MEDICINE

## 2023-07-28 PROCEDURE — G8419 CALC BMI OUT NRM PARAM NOF/U: HCPCS | Performed by: FAMILY MEDICINE

## 2023-07-28 PROCEDURE — 99204 OFFICE O/P NEW MOD 45 MIN: CPT | Performed by: FAMILY MEDICINE

## 2023-07-28 PROCEDURE — 1090F PRES/ABSN URINE INCON ASSESS: CPT | Performed by: FAMILY MEDICINE

## 2023-07-28 PROCEDURE — G8427 DOCREV CUR MEDS BY ELIG CLIN: HCPCS | Performed by: FAMILY MEDICINE

## 2023-07-28 RX ORDER — PRAMIPEXOLE DIHYDROCHLORIDE 1 MG/1
1 TABLET ORAL NIGHTLY
COMMUNITY
Start: 2023-06-15

## 2023-07-28 RX ORDER — NIFEDIPINE 30 MG/1
30 TABLET, FILM COATED, EXTENDED RELEASE ORAL DAILY
COMMUNITY

## 2023-07-28 RX ORDER — PRAMIPEXOLE DIHYDROCHLORIDE 0.5 MG/1
TABLET ORAL
COMMUNITY
Start: 2023-07-17

## 2023-07-28 RX ORDER — RIZATRIPTAN BENZOATE 10 MG/1
TABLET ORAL
COMMUNITY
Start: 2023-06-26

## 2023-07-28 ASSESSMENT — ENCOUNTER SYMPTOMS
CONSTIPATION: 0
SHORTNESS OF BREATH: 0
NAUSEA: 0
BACK PAIN: 0
DIARRHEA: 0

## 2023-07-28 NOTE — PROGRESS NOTES
[facility] ([facility]icians  [facility]  [address] , [address] [address] [facility], [address] [place] [address] [place]: [phone]  F: [phone]      Clare Knight  ([birth year])    7/28/2023    Subjective:     Clare Knight is 75 y.o. female who complains today of:    Chief Complaint   Patient presents with    Back Pain     Lower (pt states she cannot straighten her back)        HPI     This patient comes in complaining of many months of mid and lower back pain says mostly in the lower back she does not recall an injury she says she has Parkinson's and was told by her physician that it possibly is from her posture that she is going into because of her Parkinson's issues so she is wondering what options she has  Allergies:  Patient has no known allergies.     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[facility])     Infiltrate noted on imaging study     Nodular infiltrative lesions on CT Chest    ROXANA (mycobacterium avium-intracellulare) ([facility])     MCTD (mixed connective tissue disease) ([facility])     Other acute sinusitis     Pseudomonas aeruginosa infection     Pulmonary diseases due to other mycobacteria     Restless sleeper     Skin cancer     Snoring     SOB (shortness of breath)     Thyroid disease     Unspecified asthma(493.90)     Weight loss, unintentional     Wheezing      Past Surgical History:   Procedure Laterality Date    BREAST LUMPECTOMY  2017    left breast     BREAST SURGERY      5 years ago, with rotator cuff surgery    CYST REMOVAL      Bartholin Cyst removal 30 years ago    MANDIBLE SURGERY      20 years ago    ROTATOR CUFF REPAIR      Tumor & abnormal tissue on breast-5 years ago    ROTATOR CUFF REPAIR      Shoulder surgery 7/2013    SINUS SURGERY  2015     Family History   Problem Relation Age of Onset    Stroke Mother     COPD Father     Emphysema Father

## 2023-08-04 ENCOUNTER — PROCEDURE VISIT (OUTPATIENT)
Dept: SPORTS MEDICINE | Age: 68
End: 2023-08-04

## 2023-08-04 DIAGNOSIS — S33.6XXD SPRAIN OF SACROILIAC LIGAMENT, SUBSEQUENT ENCOUNTER: Primary | ICD-10-CM

## 2023-08-04 RX ORDER — LIDOCAINE HYDROCHLORIDE 10 MG/ML
8 INJECTION, SOLUTION INFILTRATION; PERINEURAL ONCE
Status: COMPLETED | OUTPATIENT
Start: 2023-08-04 | End: 2023-08-04

## 2023-08-04 RX ORDER — DEXAMETHASONE SODIUM PHOSPHATE 10 MG/ML
20 INJECTION, SOLUTION INTRAMUSCULAR; INTRAVENOUS ONCE
Status: COMPLETED | OUTPATIENT
Start: 2023-08-04 | End: 2023-08-04

## 2023-08-04 RX ADMIN — DEXAMETHASONE SODIUM PHOSPHATE 20 MG: 10 INJECTION, SOLUTION INTRAMUSCULAR; INTRAVENOUS at 13:15

## 2023-08-04 RX ADMIN — LIDOCAINE HYDROCHLORIDE 8 ML: 10 INJECTION, SOLUTION INFILTRATION; PERINEURAL at 13:15

## 2023-08-04 NOTE — PROGRESS NOTES
SACROILIAC LIGAMENT INJECTION  Dx bilateral sacroiliac ligament injection      After informed consent was provided and allergies verified, the patient was positioned appropriately. The SI ligament was prepped and draped with betadine to provide sterile environment. After prepping and draping the Bilateral SI joint in the usual sterile fashion, 1 cc of dexone with 4 cc of lidocaine were injected without any complications. Patient tolerated this well. Before aspiration/injection risks/benefits of a cortisone injection including infection, local skin irritation, skin atrophy, calcification, continued pain/discomfort, elevated blood sugar, burning, failure to relieve pain, possible late infection were discussed with patient. Post-procedure discomfort can be alleviated with additional medications/ice/elevation/rest over the first 24 hours as recommended. Patient verbalized understanding and wanted to proceed with planned procedure.     If fluid was aspirated it was sent for further studies  in sterile specimen container as ordered to the lab

## 2023-08-25 ENCOUNTER — OFFICE VISIT (OUTPATIENT)
Dept: SPORTS MEDICINE | Age: 68
End: 2023-08-25
Payer: MEDICARE

## 2023-08-25 VITALS
BODY MASS INDEX: 27.49 KG/M2 | DIASTOLIC BLOOD PRESSURE: 76 MMHG | SYSTOLIC BLOOD PRESSURE: 116 MMHG | WEIGHT: 165 LBS | TEMPERATURE: 97.6 F | HEIGHT: 65 IN

## 2023-08-25 DIAGNOSIS — S33.6XXD SPRAIN OF SACROILIAC LIGAMENT, SUBSEQUENT ENCOUNTER: Primary | ICD-10-CM

## 2023-08-25 PROCEDURE — G8399 PT W/DXA RESULTS DOCUMENT: HCPCS | Performed by: FAMILY MEDICINE

## 2023-08-25 PROCEDURE — 99213 OFFICE O/P EST LOW 20 MIN: CPT | Performed by: FAMILY MEDICINE

## 2023-08-25 PROCEDURE — 1123F ACP DISCUSS/DSCN MKR DOCD: CPT | Performed by: FAMILY MEDICINE

## 2023-08-25 PROCEDURE — 1036F TOBACCO NON-USER: CPT | Performed by: FAMILY MEDICINE

## 2023-08-25 PROCEDURE — 1090F PRES/ABSN URINE INCON ASSESS: CPT | Performed by: FAMILY MEDICINE

## 2023-08-25 PROCEDURE — 3017F COLORECTAL CA SCREEN DOC REV: CPT | Performed by: FAMILY MEDICINE

## 2023-08-25 PROCEDURE — G8419 CALC BMI OUT NRM PARAM NOF/U: HCPCS | Performed by: FAMILY MEDICINE

## 2023-08-25 PROCEDURE — G8427 DOCREV CUR MEDS BY ELIG CLIN: HCPCS | Performed by: FAMILY MEDICINE

## 2023-08-25 RX ORDER — LIDOCAINE 4 G/G
1 PATCH TOPICAL DAILY
Qty: 30 PATCH | Refills: 0 | Status: SHIPPED | OUTPATIENT
Start: 2023-08-25 | End: 2023-09-24

## 2023-08-25 ASSESSMENT — ENCOUNTER SYMPTOMS
SHORTNESS OF BREATH: 0
DIARRHEA: 0
NAUSEA: 0
CONSTIPATION: 0
BACK PAIN: 0

## 2023-09-01 ENCOUNTER — PROCEDURE VISIT (OUTPATIENT)
Dept: SPORTS MEDICINE | Age: 68
End: 2023-09-01

## 2023-09-01 DIAGNOSIS — S33.6XXD SPRAIN OF SACROILIAC LIGAMENT, SUBSEQUENT ENCOUNTER: Primary | ICD-10-CM

## 2023-09-01 RX ORDER — BETAMETHASONE SODIUM PHOSPHATE AND BETAMETHASONE ACETATE 3; 3 MG/ML; MG/ML
12 INJECTION, SUSPENSION INTRA-ARTICULAR; INTRALESIONAL; INTRAMUSCULAR; SOFT TISSUE ONCE
Status: COMPLETED | OUTPATIENT
Start: 2023-09-01 | End: 2023-09-01

## 2023-09-01 RX ORDER — LIDOCAINE HYDROCHLORIDE 10 MG/ML
4 INJECTION, SOLUTION INFILTRATION; PERINEURAL ONCE
Status: COMPLETED | OUTPATIENT
Start: 2023-09-01 | End: 2023-09-01

## 2023-09-01 RX ADMIN — BETAMETHASONE SODIUM PHOSPHATE AND BETAMETHASONE ACETATE 12 MG: 3; 3 INJECTION, SUSPENSION INTRA-ARTICULAR; INTRALESIONAL; INTRAMUSCULAR; SOFT TISSUE at 11:04

## 2023-09-01 RX ADMIN — LIDOCAINE HYDROCHLORIDE 4 ML: 10 INJECTION, SOLUTION INFILTRATION; PERINEURAL at 11:04

## 2023-09-01 NOTE — PROGRESS NOTES
SACROILIAC LIGAMENT INJECTION  Dx bilateral sacroiliac ligament sprain      After informed consent was provided and allergies verified, the patient was positioned appropriately. The SI ligament was prepped and draped with betadine to provide sterile environment. After prepping and draping the Bilateral SI ligament in the usual sterile fashion, 1 cc of Celestone with 2cc of lidocaine were injected bilaterally without any complications. Patient tolerated this well. Before aspiration/injection risks/benefits of a cortisone injection including infection, local skin irritation, skin atrophy, calcification, continued pain/discomfort, elevated blood sugar, burning, failure to relieve pain, possible late infection were discussed with patient. Post-procedure discomfort can be alleviated with additional medications/ice/elevation/rest over the first 24 hours as recommended. Patient verbalized understanding and wanted to proceed with planned procedure.     If fluid was aspirated it was sent for further studies  in sterile specimen container as ordered to the lab

## 2023-09-07 VITALS — BODY MASS INDEX: 27.55 KG/M2 | WEIGHT: 165.34 LBS | HEIGHT: 65 IN

## 2023-09-22 ENCOUNTER — OFFICE VISIT (OUTPATIENT)
Dept: SPORTS MEDICINE | Age: 68
End: 2023-09-22
Payer: MEDICARE

## 2023-09-22 VITALS — BODY MASS INDEX: 27.49 KG/M2 | TEMPERATURE: 96 F | HEIGHT: 65 IN | WEIGHT: 165 LBS

## 2023-09-22 DIAGNOSIS — M51.16 LUMBAR DISC HERNIATION WITH RADICULOPATHY: Primary | ICD-10-CM

## 2023-09-22 PROCEDURE — G8427 DOCREV CUR MEDS BY ELIG CLIN: HCPCS | Performed by: FAMILY MEDICINE

## 2023-09-22 PROCEDURE — 1036F TOBACCO NON-USER: CPT | Performed by: FAMILY MEDICINE

## 2023-09-22 PROCEDURE — G8419 CALC BMI OUT NRM PARAM NOF/U: HCPCS | Performed by: FAMILY MEDICINE

## 2023-09-22 PROCEDURE — 3017F COLORECTAL CA SCREEN DOC REV: CPT | Performed by: FAMILY MEDICINE

## 2023-09-22 PROCEDURE — 1090F PRES/ABSN URINE INCON ASSESS: CPT | Performed by: FAMILY MEDICINE

## 2023-09-22 PROCEDURE — 99213 OFFICE O/P EST LOW 20 MIN: CPT | Performed by: FAMILY MEDICINE

## 2023-09-22 PROCEDURE — G8399 PT W/DXA RESULTS DOCUMENT: HCPCS | Performed by: FAMILY MEDICINE

## 2023-09-22 PROCEDURE — 1123F ACP DISCUSS/DSCN MKR DOCD: CPT | Performed by: FAMILY MEDICINE

## 2023-09-22 ASSESSMENT — ENCOUNTER SYMPTOMS
DIARRHEA: 0
CONSTIPATION: 0
BACK PAIN: 0
SHORTNESS OF BREATH: 0
NAUSEA: 0

## 2023-09-22 NOTE — PROGRESS NOTES
Problems Other     Breast Cancer Neg Hx     Cancer Neg Hx     Colon Cancer Neg Hx     Diabetes Neg Hx     Eclampsia Neg Hx     Hypertension Neg Hx     Ovarian Cancer Neg Hx      Labor Neg Hx     Spont Abortions Neg Hx      Social History     Socioeconomic History    Marital status:      Spouse name: Not on file    Number of children: Not on file    Years of education: Not on file    Highest education level: Not on file   Occupational History    Not on file   Tobacco Use    Smoking status: Never    Smokeless tobacco: Never   Substance and Sexual Activity    Alcohol use: No    Drug use: No    Sexual activity: Not Currently   Other Topics Concern    Not on file   Social History Narrative    Not on file     Social Determinants of Health     Financial Resource Strain: Not on file   Food Insecurity: Not on file   Transportation Needs: Not on file   Physical Activity: Not on file   Stress: Not on file   Social Connections: Not on file   Intimate Partner Violence: Not on file   Housing Stability: Not on file       Current Outpatient Medications on File Prior to Visit   Medication Sig Dispense Refill    lidocaine 4 % external patch Place 1 patch onto the skin daily 30 patch 0    pramipexole (MIRAPEX) 1 MG tablet Take 1 tablet by mouth nightly at bedtime.       pramipexole (MIRAPEX) 0.5 MG tablet       rizatriptan (MAXALT) 10 MG tablet TAKE 1 BY MOUTH AT ONSET OF MIGRAINE CAN REPEAT IN 2 HRS MAX 20MG DAILY      linaclotide (LINZESS) 145 MCG capsule Take 1 capsule by mouth every morning (before breakfast)      NIFEdipine (ADALAT CC) 30 MG extended release tablet Take 1 tablet by mouth daily      PARoxetine Mesylate (BRISDELLE) 7.5 MG CAPS Take 7.5 mg by mouth daily (Patient not taking: Reported on 2023) 30 capsule 3    MIMVEY 1-0.5 MG per tablet TAKE 1 TABLET EVERY DAY (Patient not taking: Reported on 2023) 84 tablet 3    valACYclovir (VALTREX) 500 MG tablet TAKE 1 TABLET BY MOUTH DAILY (Patient not

## 2023-09-27 ENCOUNTER — HOSPITAL ENCOUNTER (OUTPATIENT)
Dept: MRI IMAGING | Age: 68
Discharge: HOME | End: 2023-09-29
Payer: MEDICARE

## 2023-09-27 DIAGNOSIS — M51.16 LUMBAR DISC HERNIATION WITH RADICULOPATHY: ICD-10-CM

## 2023-09-27 PROCEDURE — 72148 MRI LUMBAR SPINE W/O DYE: CPT

## 2023-09-28 ENCOUNTER — TELEPHONE (OUTPATIENT)
Dept: PAIN MANAGEMENT | Age: 68
End: 2023-09-28

## 2023-10-02 ENCOUNTER — OFFICE VISIT (OUTPATIENT)
Dept: SPORTS MEDICINE | Age: 68
End: 2023-10-02
Payer: MEDICARE

## 2023-10-02 VITALS
BODY MASS INDEX: 27.49 KG/M2 | SYSTOLIC BLOOD PRESSURE: 102 MMHG | WEIGHT: 165 LBS | TEMPERATURE: 97.3 F | DIASTOLIC BLOOD PRESSURE: 62 MMHG | HEIGHT: 65 IN

## 2023-10-02 DIAGNOSIS — M47.817 FACET ARTHROPATHY, LUMBOSACRAL: ICD-10-CM

## 2023-10-02 DIAGNOSIS — M43.17 SPONDYLOLISTHESIS AT L5-S1 LEVEL: ICD-10-CM

## 2023-10-02 DIAGNOSIS — M43.16 SPONDYLOLISTHESIS AT L4-L5 LEVEL: Primary | ICD-10-CM

## 2023-10-02 PROCEDURE — 1090F PRES/ABSN URINE INCON ASSESS: CPT | Performed by: FAMILY MEDICINE

## 2023-10-02 PROCEDURE — 3017F COLORECTAL CA SCREEN DOC REV: CPT | Performed by: FAMILY MEDICINE

## 2023-10-02 PROCEDURE — G8427 DOCREV CUR MEDS BY ELIG CLIN: HCPCS | Performed by: FAMILY MEDICINE

## 2023-10-02 PROCEDURE — 99213 OFFICE O/P EST LOW 20 MIN: CPT | Performed by: FAMILY MEDICINE

## 2023-10-02 PROCEDURE — 1036F TOBACCO NON-USER: CPT | Performed by: FAMILY MEDICINE

## 2023-10-02 PROCEDURE — G8419 CALC BMI OUT NRM PARAM NOF/U: HCPCS | Performed by: FAMILY MEDICINE

## 2023-10-02 PROCEDURE — G8484 FLU IMMUNIZE NO ADMIN: HCPCS | Performed by: FAMILY MEDICINE

## 2023-10-02 PROCEDURE — G8399 PT W/DXA RESULTS DOCUMENT: HCPCS | Performed by: FAMILY MEDICINE

## 2023-10-02 PROCEDURE — 1123F ACP DISCUSS/DSCN MKR DOCD: CPT | Performed by: FAMILY MEDICINE

## 2023-10-02 ASSESSMENT — ENCOUNTER SYMPTOMS
CONSTIPATION: 0
SHORTNESS OF BREATH: 0
BACK PAIN: 0
DIARRHEA: 0
NAUSEA: 0

## 2023-10-02 NOTE — PROGRESS NOTES
Other     Breast Cancer Neg Hx     Cancer Neg Hx     Colon Cancer Neg Hx     Diabetes Neg Hx     Eclampsia Neg Hx     Hypertension Neg Hx     Ovarian Cancer Neg Hx      Labor Neg Hx     Spont Abortions Neg Hx      Social History     Socioeconomic History    Marital status:      Spouse name: Not on file    Number of children: Not on file    Years of education: Not on file    Highest education level: Not on file   Occupational History    Not on file   Tobacco Use    Smoking status: Never    Smokeless tobacco: Never   Substance and Sexual Activity    Alcohol use: No    Drug use: No    Sexual activity: Not Currently   Other Topics Concern    Not on file   Social History Narrative    Not on file     Social Determinants of Health     Financial Resource Strain: Not on file   Food Insecurity: Not on file   Transportation Needs: Not on file   Physical Activity: Not on file   Stress: Not on file   Social Connections: Not on file   Intimate Partner Violence: Not on file   Housing Stability: Not on file       Current Outpatient Medications on File Prior to Visit   Medication Sig Dispense Refill    Lidocaine 4 % OINT Apply 1 Application topically in the morning and at bedtime 150 g 0    pramipexole (MIRAPEX) 1 MG tablet Take 1 tablet by mouth nightly at bedtime.       pramipexole (MIRAPEX) 0.5 MG tablet       rizatriptan (MAXALT) 10 MG tablet TAKE 1 BY MOUTH AT ONSET OF MIGRAINE CAN REPEAT IN 2 HRS MAX 20MG DAILY      linaclotide (LINZESS) 145 MCG capsule Take 1 capsule by mouth every morning (before breakfast)      NIFEdipine (ADALAT CC) 30 MG extended release tablet Take 1 tablet by mouth daily      PARoxetine Mesylate (BRISDELLE) 7.5 MG CAPS Take 7.5 mg by mouth daily 30 capsule 3    MIMVEY 1-0.5 MG per tablet TAKE 1 TABLET EVERY DAY 84 tablet 3    valACYclovir (VALTREX) 500 MG tablet TAKE 1 TABLET BY MOUTH DAILY 90 tablet 3    estradiol-norethindrone (MIMVEY) 1-0.5 MG per tablet TAKE 1 TABLET EVERY DAY 90 tablet

## 2023-10-10 ENCOUNTER — INITIAL CONSULT (OUTPATIENT)
Dept: PAIN MANAGEMENT | Age: 68
End: 2023-10-10
Payer: MEDICARE

## 2023-10-10 VITALS
SYSTOLIC BLOOD PRESSURE: 110 MMHG | TEMPERATURE: 97.5 F | WEIGHT: 165 LBS | HEIGHT: 66 IN | BODY MASS INDEX: 26.52 KG/M2 | DIASTOLIC BLOOD PRESSURE: 62 MMHG

## 2023-10-10 DIAGNOSIS — M47.816 LUMBAR SPONDYLOSIS: Primary | ICD-10-CM

## 2023-10-10 DIAGNOSIS — M47.819 FACET HYPERTROPHY: ICD-10-CM

## 2023-10-10 DIAGNOSIS — F33.1 MAJOR DEPRESSIVE DISORDER, RECURRENT, MODERATE (HCC): ICD-10-CM

## 2023-10-10 DIAGNOSIS — M51.36 DDD (DEGENERATIVE DISC DISEASE), LUMBAR: ICD-10-CM

## 2023-10-10 DIAGNOSIS — F33.0 MAJOR DEPRESSIVE DISORDER, RECURRENT, MILD (HCC): ICD-10-CM

## 2023-10-10 PROBLEM — F33.9 MAJOR DEPRESSIVE DISORDER, RECURRENT, UNSPECIFIED (HCC): Status: ACTIVE | Noted: 2023-10-10

## 2023-10-10 PROCEDURE — G8399 PT W/DXA RESULTS DOCUMENT: HCPCS | Performed by: PAIN MEDICINE

## 2023-10-10 PROCEDURE — 99204 OFFICE O/P NEW MOD 45 MIN: CPT | Performed by: PAIN MEDICINE

## 2023-10-10 PROCEDURE — 1036F TOBACCO NON-USER: CPT | Performed by: PAIN MEDICINE

## 2023-10-10 PROCEDURE — G8484 FLU IMMUNIZE NO ADMIN: HCPCS | Performed by: PAIN MEDICINE

## 2023-10-10 PROCEDURE — 1090F PRES/ABSN URINE INCON ASSESS: CPT | Performed by: PAIN MEDICINE

## 2023-10-10 PROCEDURE — G8427 DOCREV CUR MEDS BY ELIG CLIN: HCPCS | Performed by: PAIN MEDICINE

## 2023-10-10 PROCEDURE — G8419 CALC BMI OUT NRM PARAM NOF/U: HCPCS | Performed by: PAIN MEDICINE

## 2023-10-10 PROCEDURE — 1123F ACP DISCUSS/DSCN MKR DOCD: CPT | Performed by: PAIN MEDICINE

## 2023-10-10 PROCEDURE — 3017F COLORECTAL CA SCREEN DOC REV: CPT | Performed by: PAIN MEDICINE

## 2023-10-10 ASSESSMENT — ENCOUNTER SYMPTOMS
SHORTNESS OF BREATH: 0
CONSTIPATION: 0
BACK PAIN: 1
NAUSEA: 0
DIARRHEA: 0

## 2023-10-10 NOTE — PROGRESS NOTES
[facility]) Physicians  Neurosurgery and Pain [address], [address] [facility], [address] [place] [address] [place]: [phone]  F: [phone]        Abril Pedersen  ([birth year])    10/10/2023    Subjective:     Abril Pedersen is 75 y.o. female who complains today of:    Chief Complaint   Patient presents with    Back Pain       HPI    Patient has a h/o of Recently diagnosed Parkinson's Disease. Seeing neurology. Pain is located in the lower back. It is axial.  Pain is chronic  Pain is described as throbbing, aching, and sharp, burning, tingling. Pain level today is rated 6 on a 10 point scale. It is severe in nature. With pain medication, pain level drops to a 6/10. Without pain medication, pain level can escalate upto a 10/10. Pain is affecting the patients ability to perform activities of daily living especially with regards to personal hygiene, household chores and self care. With pain medication, the patient is better able to perform ADLs. Pain in part is secondary to osteoarthritis of the spine. The patient is tolerating her pain medication (Lidocaine cream, Motrin) regimen without any adverse effects. Pain is aggravated by bending, lifting, twisting, walking, and standing  Pain is not associated with fevers/chills/night sweats. Bowel and bladder are working appropriately. No new issues with balance. No new paraesthesias noted. The patient is here to discuss results of her studies. After Physical Therapy, she did not get much relief. She did a PT course this summer at [facility].        Allergies:  Prednisone    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[facility])     Infiltrate noted on imaging study     Nodular infiltrative lesions on CT Chest    ROXANA (mycobacterium avium-intracellulare) ([facility])     Major depressive disorder, recurrent, mild 10/10/2023

## 2023-10-25 ENCOUNTER — PROCEDURE VISIT (OUTPATIENT)
Dept: PAIN MANAGEMENT | Age: 68
End: 2023-10-25
Payer: MEDICARE

## 2023-10-25 DIAGNOSIS — M47.816 LUMBAR SPONDYLOSIS: ICD-10-CM

## 2023-10-25 PROCEDURE — 64494 INJ PARAVERT F JNT L/S 2 LEV: CPT | Performed by: PAIN MEDICINE

## 2023-10-25 PROCEDURE — 64493 INJ PARAVERT F JNT L/S 1 LEV: CPT | Performed by: PAIN MEDICINE

## 2023-10-25 RX ORDER — LIDOCAINE HYDROCHLORIDE 10 MG/ML
3 INJECTION, SOLUTION EPIDURAL; INFILTRATION; INTRACAUDAL; PERINEURAL ONCE
Status: COMPLETED | OUTPATIENT
Start: 2023-10-25 | End: 2023-10-25

## 2023-10-25 RX ADMIN — LIDOCAINE HYDROCHLORIDE 3 ML: 10 INJECTION, SOLUTION EPIDURAL; INFILTRATION; INTRACAUDAL; PERINEURAL at 14:05

## 2023-10-25 NOTE — PROGRESS NOTES
discharged home in stable condition with their usual motor strength. The patient will keep close track of pain over the next several hours and call our office tomorrow and let us know what percentage of pain relief is experienced. Post op Instructions were given to patient. [x] Bilateral [] T12 [] S1      [] L1 [] S2     [] Right [] L2 [] S3      [x] L3      [] Left [x] L4         [x] L5 [] S. I. Patient has >80% pain relief following procedure with positive facet joint provocative maneuvers.  Schedule second MBB trial.     Curtis Krause MD
26-Dec-2021 21:24

## 2023-11-08 ENCOUNTER — PROCEDURE VISIT (OUTPATIENT)
Dept: PAIN MANAGEMENT | Age: 68
End: 2023-11-08
Payer: MEDICARE

## 2023-11-08 DIAGNOSIS — M47.816 LUMBAR SPONDYLOSIS: ICD-10-CM

## 2023-11-08 PROCEDURE — 64494 INJ PARAVERT F JNT L/S 2 LEV: CPT | Performed by: PAIN MEDICINE

## 2023-11-08 PROCEDURE — 64493 INJ PARAVERT F JNT L/S 1 LEV: CPT | Performed by: PAIN MEDICINE

## 2023-11-08 RX ORDER — LIDOCAINE HYDROCHLORIDE 10 MG/ML
3 INJECTION, SOLUTION EPIDURAL; INFILTRATION; INTRACAUDAL; PERINEURAL ONCE
Status: COMPLETED | OUTPATIENT
Start: 2023-11-08 | End: 2023-11-08

## 2023-11-08 RX ADMIN — LIDOCAINE HYDROCHLORIDE 3 ML: 10 INJECTION, SOLUTION EPIDURAL; INFILTRATION; INTRACAUDAL; PERINEURAL at 11:50

## 2023-11-08 NOTE — PROGRESS NOTES
[facility]) Physicians  Neurosurgery and Pain [facility]  [address] [place], [address] [address] [facility], [address] [place], [place] [address]: [phone]  F: [phone]          LUMBAR/SACRAL MEDIAL BRANCH BLOCK      Provider: Adrian Choe MD          Patient Name: Gayle Barrera : 1955        Date: 2023       Gayle Barrera is here today for interventional pain management. Discussed the risks including but not limited to bleeding, infection, worsened pain, damage to surrounding structures, side effects, toxicity, allergic reactions to medications used, need for surgery, pneumothorax, abdominal and visceral injury, as well as catastrophic injury such as vision loss, paralysis, spinal cord or plexus injury, stroke, bowel or bladder incontinence, chest tube insertion, ventilator dependence, and death. Discussed the risks, benefits, and alternatives to the procedure including no procedure at all. Discussed that we cannot undo any permanent neurologic or orthopaedic damage or change the course of any underlying disease. After thorough discussion, patient expressed understanding and willingness to proceed. Written consent was obtained and is in the chart. Verbal consent to proceed was obtained. Standard ASIPP guidelines were followed and sterile technique used. Area was cleaned with Betadine x3. Informed consent was obtained. Fluoroscopic guidance was used for this procedure. Junction of superior articular process and transverse process was identified. For L5 dorsal primary ramus groove of sacral ala and SAP of S1 was identified. Appropriate length spinal needle was used and advanced to correct anatomic location. Negative aspiration was achieved. At each site approximately 1/2cc of 1% preservative free Lidocaine was injected without difficulty. Patient tolerated the procedure well, no obvious complications occurred during the procedure.   Patient was appropriately monitored and discharged

## 2023-11-15 ENCOUNTER — OFFICE VISIT (OUTPATIENT)
Dept: PAIN MANAGEMENT | Age: 68
End: 2023-11-15
Payer: MEDICARE

## 2023-11-15 DIAGNOSIS — M47.816 LUMBAR SPONDYLOSIS: ICD-10-CM

## 2023-11-15 PROCEDURE — 64636 DESTROY L/S FACET JNT ADDL: CPT | Performed by: PAIN MEDICINE

## 2023-11-15 PROCEDURE — 64635 DESTROY LUMB/SAC FACET JNT: CPT | Performed by: PAIN MEDICINE

## 2023-11-15 RX ORDER — LIDOCAINE HYDROCHLORIDE 10 MG/ML
3 INJECTION, SOLUTION EPIDURAL; INFILTRATION; INTRACAUDAL; PERINEURAL ONCE
Status: COMPLETED | OUTPATIENT
Start: 2023-11-15 | End: 2023-11-15

## 2023-11-15 RX ORDER — BETAMETHASONE SODIUM PHOSPHATE AND BETAMETHASONE ACETATE 3; 3 MG/ML; MG/ML
6 INJECTION, SUSPENSION INTRA-ARTICULAR; INTRALESIONAL; INTRAMUSCULAR; SOFT TISSUE ONCE
Status: COMPLETED | OUTPATIENT
Start: 2023-11-15 | End: 2023-11-15

## 2023-11-15 RX ADMIN — BETAMETHASONE SODIUM PHOSPHATE AND BETAMETHASONE ACETATE 6 MG: 3; 3 INJECTION, SUSPENSION INTRA-ARTICULAR; INTRALESIONAL; INTRAMUSCULAR; SOFT TISSUE at 15:07

## 2023-11-15 RX ADMIN — LIDOCAINE HYDROCHLORIDE 3 ML: 10 INJECTION, SOLUTION EPIDURAL; INFILTRATION; INTRACAUDAL; PERINEURAL at 15:07

## 2023-11-15 NOTE — PROGRESS NOTES
[address]  Neurosurgery and Pain [address] , 137 [facility], [address] [place] [address] [place]: [phone]  F: [phone]             Provider: Jose Alfredo Navarro MD          Patient Name: Jacey Balderrama : 1955        Date: 11/15/2023         PROCEDURE:  Bilateral L3, L4, L5 medial branch ablation by radiofrequency. Discussed the risks including but not limited to bleeding, infection, worsened pain, damage to surrounding structures, side effects, toxicity, allergic reactions to medications used, need for surgery, pneumothorax, abdominal and visceral injury, as well as catastrophic injury such as vision loss, paralysis, spinal cord or plexus injury, stroke, bowel or bladder incontinence, chest tube insertion, ventilator dependence, and death. Discussed the risks, benefits, and alternatives to the procedure including no procedure at all. Discussed that we cannot undo any permanent neurologic or orthopaedic damage or change the course of any underlying disease. After thorough discussion, patient expressed understanding and willingness to proceed. Written consent was obtained and is in the chart. Verbal consent to proceed was obtained. Description of Procedure: The procedure and potential complications were explained to the patient and a voluntary informed, signed consent was obtained. The patient was placed prone on the x-ray table and the mid back was prepped with Betadine solution. Under fluoroscopic guidance the skin was infiltrated with 1% preservative free lidocaine. A 20-gauge, 3.5 inch long curved cannula with a 10 mm active curved tip was inserted to place the cannula tip on the junction of the superior articular process and transverse process where the medial branches are located. After motor tests were done 0.5 mL of 1% preservative free lidocaine was injected at each site.   Ablation was carried out at 80 degrees Celsius for 95 seconds and it

## 2023-11-16 ENCOUNTER — TELEPHONE (OUTPATIENT)
Dept: PAIN MANAGEMENT | Age: 68
End: 2023-11-16

## 2023-11-16 NOTE — TELEPHONE ENCOUNTER
Spoke to patient she is going to take bendryl for the red face and aware can take 1-4 weeks for the nerves to die and to ice the area

## 2023-11-16 NOTE — TELEPHONE ENCOUNTER
Patient states that she is having tingling in her back in the area that she had the RFA done, she is asking if this is normal? She says her face is also red but she gets that when she has steroids.

## 2023-12-26 ENCOUNTER — APPOINTMENT (OUTPATIENT)
Dept: PRIMARY CARE | Facility: CLINIC | Age: 68
End: 2023-12-26
Payer: COMMERCIAL

## 2023-12-31 PROBLEM — G20.A1 PARKINSON'S DISEASE (MULTI): Status: ACTIVE | Noted: 2023-12-31

## 2024-01-04 ENCOUNTER — LAB (OUTPATIENT)
Dept: LAB | Facility: LAB | Age: 69
End: 2024-01-04
Payer: MEDICARE

## 2024-01-04 DIAGNOSIS — I10 HYPERTENSION, UNSPECIFIED TYPE: ICD-10-CM

## 2024-01-04 LAB
ALBUMIN SERPL BCP-MCNC: 4.3 G/DL (ref 3.4–5)
ALP SERPL-CCNC: 97 U/L (ref 33–136)
ALT SERPL W P-5'-P-CCNC: 17 U/L (ref 7–45)
ANION GAP SERPL CALC-SCNC: 11 MMOL/L (ref 10–20)
AST SERPL W P-5'-P-CCNC: 25 U/L (ref 9–39)
BILIRUB SERPL-MCNC: 0.6 MG/DL (ref 0–1.2)
BUN SERPL-MCNC: 13 MG/DL (ref 6–23)
CALCIUM SERPL-MCNC: 9.3 MG/DL (ref 8.6–10.3)
CHLORIDE SERPL-SCNC: 105 MMOL/L (ref 98–107)
CO2 SERPL-SCNC: 28 MMOL/L (ref 21–32)
CREAT SERPL-MCNC: 0.85 MG/DL (ref 0.5–1.05)
GFR SERPL CREATININE-BSD FRML MDRD: 74 ML/MIN/1.73M*2
GLUCOSE SERPL-MCNC: 82 MG/DL (ref 74–99)
POTASSIUM SERPL-SCNC: 4.2 MMOL/L (ref 3.5–5.3)
PROT SERPL-MCNC: 7.5 G/DL (ref 6.4–8.2)
SODIUM SERPL-SCNC: 140 MMOL/L (ref 136–145)

## 2024-01-04 PROCEDURE — 80053 COMPREHEN METABOLIC PANEL: CPT

## 2024-01-04 PROCEDURE — 36415 COLL VENOUS BLD VENIPUNCTURE: CPT

## 2024-01-08 ENCOUNTER — OFFICE VISIT (OUTPATIENT)
Dept: PRIMARY CARE | Facility: CLINIC | Age: 69
End: 2024-01-08
Payer: MEDICARE

## 2024-01-08 VITALS
HEART RATE: 65 BPM | OXYGEN SATURATION: 96 % | SYSTOLIC BLOOD PRESSURE: 131 MMHG | DIASTOLIC BLOOD PRESSURE: 82 MMHG | WEIGHT: 170 LBS | HEIGHT: 63 IN | BODY MASS INDEX: 30.12 KG/M2

## 2024-01-08 DIAGNOSIS — M34.9 SCLERODERMA (MULTI): ICD-10-CM

## 2024-01-08 DIAGNOSIS — Z12.31 SCREENING MAMMOGRAM, ENCOUNTER FOR: ICD-10-CM

## 2024-01-08 DIAGNOSIS — I73.00 RAYNAUD'S DISEASE WITHOUT GANGRENE: ICD-10-CM

## 2024-01-08 DIAGNOSIS — M32.9 SYSTEMIC LUPUS ERYTHEMATOSUS, UNSPECIFIED (MULTI): ICD-10-CM

## 2024-01-08 DIAGNOSIS — J47.1 BRONCHIECTASIS WITH ACUTE EXACERBATION (MULTI): Primary | ICD-10-CM

## 2024-01-08 DIAGNOSIS — G43.009 MIGRAINE WITHOUT AURA AND WITHOUT STATUS MIGRAINOSUS, NOT INTRACTABLE: ICD-10-CM

## 2024-01-08 DIAGNOSIS — N95.1 VASOMOTOR SYMPTOMS DUE TO MENOPAUSE: ICD-10-CM

## 2024-01-08 DIAGNOSIS — R73.03 PRE-DIABETES: ICD-10-CM

## 2024-01-08 DIAGNOSIS — M35.1 MCTD (MIXED CONNECTIVE TISSUE DISEASE) (MULTI): ICD-10-CM

## 2024-01-08 DIAGNOSIS — D83.9: ICD-10-CM

## 2024-01-08 DIAGNOSIS — J32.9 RECURRENT SINUSITIS: ICD-10-CM

## 2024-01-08 DIAGNOSIS — I10 HYPERTENSION, UNSPECIFIED TYPE: ICD-10-CM

## 2024-01-08 DIAGNOSIS — F33.1 MAJOR DEPRESSIVE DISORDER, RECURRENT, MODERATE (MULTI): ICD-10-CM

## 2024-01-08 PROBLEM — F33.0 MAJOR DEPRESSIVE DISORDER, RECURRENT, MILD (CMS-HCC): Status: ACTIVE | Noted: 2023-10-10

## 2024-01-08 PROBLEM — F33.9 MAJOR DEPRESSIVE DISORDER, RECURRENT, UNSPECIFIED (CMS-HCC): Status: ACTIVE | Noted: 2023-10-10

## 2024-01-08 PROCEDURE — 1159F MED LIST DOCD IN RCRD: CPT | Performed by: FAMILY MEDICINE

## 2024-01-08 PROCEDURE — 99214 OFFICE O/P EST MOD 30 MIN: CPT | Performed by: FAMILY MEDICINE

## 2024-01-08 PROCEDURE — 3079F DIAST BP 80-89 MM HG: CPT | Performed by: FAMILY MEDICINE

## 2024-01-08 PROCEDURE — 1125F AMNT PAIN NOTED PAIN PRSNT: CPT | Performed by: FAMILY MEDICINE

## 2024-01-08 PROCEDURE — 1036F TOBACCO NON-USER: CPT | Performed by: FAMILY MEDICINE

## 2024-01-08 PROCEDURE — 3075F SYST BP GE 130 - 139MM HG: CPT | Performed by: FAMILY MEDICINE

## 2024-01-08 PROCEDURE — 1160F RVW MEDS BY RX/DR IN RCRD: CPT | Performed by: FAMILY MEDICINE

## 2024-01-08 RX ORDER — NIFEDIPINE 30 MG/1
30 TABLET, FILM COATED, EXTENDED RELEASE ORAL DAILY
Qty: 30 TABLET | Refills: 6 | Status: SHIPPED | OUTPATIENT
Start: 2024-01-08 | End: 2024-06-11 | Stop reason: ALTCHOICE

## 2024-01-08 RX ORDER — SULFASALAZINE 500 MG/1
1000 TABLET ORAL DAILY
Qty: 60 TABLET | Refills: 6 | Status: SHIPPED | OUTPATIENT
Start: 2024-01-08 | End: 2024-05-29 | Stop reason: SDUPTHER

## 2024-01-08 RX ORDER — HYDROXYCHLOROQUINE SULFATE 200 MG/1
200 TABLET, FILM COATED ORAL DAILY
Qty: 30 TABLET | Refills: 6 | Status: SHIPPED | OUTPATIENT
Start: 2024-01-08 | End: 2024-03-18 | Stop reason: ALTCHOICE

## 2024-01-08 RX ORDER — FEZOLINETANT 45 MG/1
45 TABLET, FILM COATED ORAL DAILY
Qty: 30 TABLET | Refills: 6 | Status: SHIPPED | OUTPATIENT
Start: 2024-01-08 | End: 2024-03-18

## 2024-01-08 RX ORDER — IBUPROFEN 600 MG/1
600 TABLET ORAL EVERY 8 HOURS PRN
Qty: 90 TABLET | Refills: 5 | Status: SHIPPED | OUTPATIENT
Start: 2024-01-08

## 2024-01-08 RX ORDER — AMOXICILLIN AND CLAVULANATE POTASSIUM 875; 125 MG/1; MG/1
875 TABLET, FILM COATED ORAL 2 TIMES DAILY
Qty: 20 TABLET | Refills: 0 | Status: SHIPPED | OUTPATIENT
Start: 2024-01-08 | End: 2024-01-18

## 2024-01-08 RX ORDER — PROPRANOLOL HYDROCHLORIDE 80 MG/1
80 CAPSULE, EXTENDED RELEASE ORAL DAILY
Qty: 30 CAPSULE | Refills: 6 | Status: SHIPPED | OUTPATIENT
Start: 2024-01-08

## 2024-01-08 RX ORDER — RIZATRIPTAN BENZOATE 10 MG/1
TABLET ORAL
Qty: 9 TABLET | Refills: 6 | Status: SHIPPED | OUTPATIENT
Start: 2024-01-08

## 2024-01-08 NOTE — PROGRESS NOTES
General Medical Management Note    68 y.o. female presents for Medical Management  HPI    Recurrent sinus problems.  Green mucus, copious.    Immune deficiency - IgG infusions monthly  Bronchiectasis - patient thinks green mucus from nose is related to lungs  Hot flashes - veozah   Parkinson's Disease - Mirapex, Neurologist  Migraine  Mixed connective tissue disease - Plaquenil and Azulfadine    Weight management:  exercises 3x/wk, and bowling once/wk.  Always hungry.  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)     Anti-pneumococcal polysaccharide antibody deficiency    COVID-19 09/21/2022    COVID-19 virus infection    COVID-19 virus infection 06/21/2023    Dysphagia, unspecified     Dysphagia, idiopathic    Encounter for screening for malignant neoplasm of cervix     Pap smear for cervical cancer screening    Localized edema 05/04/2022    Bilateral edema of lower extremity    Parkinson's disease (CMS/HCC) 06/21/2022    Parkinsonism    Personal history of other diseases of the digestive system 07/18/2018    History of hiatal hernia    Personal history of other diseases of the musculoskeletal system and connective tissue     History of osteoarthritis    Personal history of other diseases of the respiratory system 10/08/2017    History of asthma    Personal history of other endocrine, nutritional and metabolic disease 08/08/2016    History of hyperlipidemia    Personal history of other endocrine, nutritional and metabolic disease 10/08/2017    History of hypothyroidism    Personal history of other malignant neoplasm of skin     History of malignant neoplasm of skin    Personal history of other medical treatment     History of screening mammography    Personal history of pneumonia (recurrent) 03/18/2020    History of pneumonia due to Pseudomonas    Personal history of pneumonia (recurrent)  01/29/2021    History of pneumonia due to Pseudomonas    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    Raynaud's syndrome without gangrene     Raynaud disease      Past Surgical History:   Procedure Laterality Date    BREAST LUMPECTOMY  06/23/2017    Right Breast Lumpectomy    COLONOSCOPY  07/29/2015    Complete Colonoscopy    GASTRIC FUNDOPLICATION  07/19/2018    Esophagogastric Fundoplasty Nissen Fundoplication    OTHER SURGICAL HISTORY  02/19/2015    Treatment Of Jaw    OTHER SURGICAL HISTORY  06/06/2019    Nissen fundoplication laparoscopic    OTHER SURGICAL HISTORY  06/23/2017    Biopsy Pleural    OTHER SURGICAL HISTORY  06/23/2017    Shoulder Repair    SINUS SURGERY  06/23/2017    Sinus Surgery    TONSILLECTOMY  06/23/2017    Tonsillectomy     Family History   Problem Relation Name Age of Onset    Other (cerebrovascular accident) Mother      COPD Father      Prostate cancer Brother        Social History     Socioeconomic History    Marital status:      Spouse name: Not on file    Number of children: Not on file    Years of education: Not on file    Highest education level: Not on file   Occupational History    Not on file   Tobacco Use    Smoking status: Never     Passive exposure: Never    Smokeless tobacco: Never   Substance and Sexual Activity    Alcohol use: Not Currently    Drug use: Never    Sexual activity: Not on file   Other Topics Concern    Not on file   Social History Narrative    Not on file     Social Determinants of Health     Financial Resource Strain: Not on file   Food Insecurity: Not on file   Transportation Needs: Not on file   Physical Activity: Not on file   Stress: Not on file   Social Connections: Not on file   Intimate Partner Violence: Not on file   Housing Stability: Not on file       Current Outpatient Medications on File Prior to Visit   Medication Sig Dispense Refill    hydroxychloroquine (Plaquenil) 200 mg tablet Take 1  "tablet (200 mg) by mouth once daily. 90 tablet 2    ibuprofen 600 mg tablet Take 1 tablet (600 mg) by mouth.      linaCLOtide (Linzess) 145 mcg capsule Take 2 capsules (290 mcg) by mouth once daily.      NIFEdipine CC 30 mg 24 hr tablet Take 1 tablet (30 mg) by mouth once daily. 90 tablet 2    omeprazole (PriLOSEC) 40 mg DR capsule Take 1 capsule (40 mg) by mouth once daily.      pramipexole (Mirapex) 0.5 mg tablet Take 1 tablet (0.5 mg) by mouth 3 times a day.      pramipexole (Mirapex) 1 mg tablet Take 1 tablet (1 mg) by mouth once daily at bedtime.      propranolol LA (Inderal LA) 80 mg 24 hr capsule Take 1 capsule (80 mg) by mouth once daily. 90 capsule 2    rizatriptan (Maxalt) 10 mg tablet One po at onset of migraine; can repeat in 2 hrs;  max 20mg daily 27 tablet 2    sulfaSALAzine (Azulfidine) 500 mg tablet Take 2 tablets (1,000 mg) by mouth once daily. 2 tabs once a day 180 tablet 2    immune globulin, human, (Gammagard Liquid) infusion Infuse monthly as directed       No current facility-administered medications on file prior to visit.       Allergies   Allergen Reactions    Doxycycline Other and Unknown    Prednisone Unknown     Red and hot when taking prednisone    Topiramate Other, Nausea Only, Palpitations and Unknown         ROS: Denies chest pain, SOB, Headache, GI problems     Visit Vitals  /82   Pulse 65   Ht 1.6 m (5' 3\")   Wt 77.1 kg (170 lb)   SpO2 96%   BMI 30.11 kg/m²   Smoking Status Never   BSA 1.85 m²        PHYSICAL EXAM:  Alert and oriented x3.  Eyes: EOM grossly intact  Neck supple without lymph adenopathy or carotid bruit.  No masses or thyromegaly  Heart regular rate and rhythm without murmur.  Lungs clear to auscultation.  Legs without edema.  Gait is non-antalgic  Speech clear.  Hearing adequate.      Extensive time spent with patient documenting her specialists    DIAGNOSIS/PLAN:  1. Hypertension, unspecified type  - Comprehensive Metabolic Panel; Future    2. Screening " mammogram, encounter for  - BI mammo bilateral screening tomosynthesis; Future    3. Recurrent sinusitis  - Referral to ENT; Future    4. Bronchiectasis with acute exacerbation (CMS/HCC)  - Referral to Pulmonology; Future  - amoxicillin-pot clavulanate (Augmentin) 875-125 mg tablet; Take 1 tablet (875 mg) by mouth 2 times a day for 10 days.  Dispense: 20 tablet; Refill: 0    5. Vasomotor symptoms due to menopause  - fezolinetant (Veozah) 45 mg tablet; Take 45 mg by mouth once daily.  Dispense: 30 tablet; Refill: 6    6. MCTD (mixed connective tissue disease) (CMS/HCC)  - hydroxychloroquine (Plaquenil) 200 mg tablet; Take 1 tablet (200 mg) by mouth once daily.  Dispense: 30 tablet; Refill: 6  - ibuprofen 600 mg tablet; Take 1 tablet (600 mg) by mouth every 8 hours if needed for mild pain (1 - 3).  Dispense: 90 tablet; Refill: 5    7. Raynaud's disease without gangrene  - NIFEdipine ER (NIFEdipine CC) 30 mg 24 hr tablet; Take 1 tablet (30 mg) by mouth once daily.  Dispense: 30 tablet; Refill: 6    8. Migraine without aura and without status migrainosus, not intractable  - ibuprofen 600 mg tablet; Take 1 tablet (600 mg) by mouth every 8 hours if needed for mild pain (1 - 3).  Dispense: 90 tablet; Refill: 5  - propranolol LA (Inderal LA) 80 mg 24 hr capsule; Take 1 capsule (80 mg) by mouth once daily.  Dispense: 30 capsule; Refill: 6  - rizatriptan (Maxalt) 10 mg tablet; One po at onset of migraine; can repeat in 2 hrs;  max 20mg daily  Dispense: 9 tablet; Refill: 6    9. Systemic lupus erythematosus, unspecified (CMS/HCC)  - sulfaSALAzine (Azulfidine) 500 mg tablet; Take 2 tablets (1,000 mg) by mouth once daily. 2 tabs once a day  Dispense: 60 tablet; Refill: 6    10. CVI (common variable immunodeficiency) (CMS/HCC)    11. Scleroderma (CMS/HCC)    12. Major depressive disorder, recurrent, moderate (CMS/HCC)    13. Pre-diabetes  -Patient is aware that GLP-1 analogs may cause medullary thyroid cancer at high doses.  If  affordable, the patient will notify me in 4 weeks for an increase in dose.    - semaglutide 0.25 mg or 0.5 mg (2 mg/3 mL) pen injector; Inject 0.25 mg under the skin 1 (one) time per week.  Dispense: 3 mL; Refill: 0        Return to office in 6 months for comprehensive medical evaluation, long-term medication use monitoring, and preventative services screening    We will continue to monitor, evaluate, assess and treat all problems/diagnoses as appropriate and continue to collaborate with specialists.    [facility]    Contact office or send a  [employer] message with any questions or concerns    Patient will only be notified of labs that require medical intervention.    Prescriptions will not be filled unless you are compliant with your follow up appointments or have a follow up appointment scheduled as per instruction of your physician. Refills should be requested at the time of your visit.    **Charting was completed using voice recognition technology and may include unintended errors**    Miguel Alvarado DO, JOSE  [address] [facility], [id number]  [place] [address] [phone]  Miguel Alvarado DO, JOSE

## 2024-01-08 NOTE — PATIENT INSTRUCTIONS
The patient and I discussed mechanism of action, potential benefits and adverse effects of Mounjaro and other GLP1 analog medications.  These medications are indicated for pre-diabetes (metabolic syndrome, insulin-resistance) and Diabetes Mellitus.    The patient understands these medications are contraindicated in patients with medullary thyroid carcinoma (MTC) history or family history.  They are also contraindicated in patients with multiple endocrine neoplasia syndrome type 2 (MEN2).    A dose-related and duration-dependent increase in thyroid C-cell tumor incidence was observed in rodents, but human relevance is unknown.    The patient  understands there is a potential  MTC risk if using these medications  The patient will notify me if develops a neck mass, difficulty swallowing or breathing, and if develops persistent hoarseness.

## 2024-01-15 ENCOUNTER — OFFICE VISIT (OUTPATIENT)
Dept: NEUROLOGY | Facility: HOSPITAL | Age: 69
End: 2024-01-15
Payer: MEDICARE

## 2024-01-15 VITALS
RESPIRATION RATE: 18 BRPM | TEMPERATURE: 96.1 F | WEIGHT: 170 LBS | DIASTOLIC BLOOD PRESSURE: 73 MMHG | SYSTOLIC BLOOD PRESSURE: 132 MMHG | HEART RATE: 64 BPM | BODY MASS INDEX: 30.12 KG/M2 | HEIGHT: 63 IN

## 2024-01-15 DIAGNOSIS — G20.A1 PARKINSON'S DISEASE WITHOUT DYSKINESIA OR FLUCTUATING MANIFESTATIONS (MULTI): ICD-10-CM

## 2024-01-15 DIAGNOSIS — G25.81 RLS (RESTLESS LEGS SYNDROME): Primary | ICD-10-CM

## 2024-01-15 PROCEDURE — 99213 OFFICE O/P EST LOW 20 MIN: CPT | Performed by: PSYCHIATRY & NEUROLOGY

## 2024-01-15 PROCEDURE — 1159F MED LIST DOCD IN RCRD: CPT | Performed by: PSYCHIATRY & NEUROLOGY

## 2024-01-15 PROCEDURE — 1160F RVW MEDS BY RX/DR IN RCRD: CPT | Performed by: PSYCHIATRY & NEUROLOGY

## 2024-01-15 PROCEDURE — 1036F TOBACCO NON-USER: CPT | Performed by: PSYCHIATRY & NEUROLOGY

## 2024-01-15 PROCEDURE — 1126F AMNT PAIN NOTED NONE PRSNT: CPT | Performed by: PSYCHIATRY & NEUROLOGY

## 2024-01-15 RX ORDER — PRAMIPEXOLE DIHYDROCHLORIDE 1 MG/1
1 TABLET ORAL NIGHTLY
Qty: 90 TABLET | Refills: 3 | Status: SHIPPED | OUTPATIENT
Start: 2024-01-15 | End: 2024-06-11 | Stop reason: ALTCHOICE

## 2024-01-15 RX ORDER — PRAMIPEXOLE DIHYDROCHLORIDE 0.5 MG/1
0.5 TABLET ORAL 3 TIMES DAILY
Qty: 270 TABLET | Refills: 3 | Status: SHIPPED | OUTPATIENT
Start: 2024-01-15 | End: 2024-06-11 | Stop reason: ALTCHOICE

## 2024-01-15 ASSESSMENT — PAIN SCALES - GENERAL: PAINLEVEL: 0-NO PAIN

## 2024-01-15 NOTE — PROGRESS NOTES
Diagnoses/Problems     · Back pain, chronic (724.5,338.29) (M54.9,G89.29)   · Parkinson's disease (332.0) (G20)     Chief Complaint  PD.      History of Present Illness  Ms. 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     Interval hx:  tremor still under good control except when stressed. . struggles with more stooping and low back pain (the latter is chronic from known lumbar disc disease). Doing online gambling (not free) but hasn't been losing money. Word finding difficulty getting worse.      GOLDEN CHAMBERS is here for follow-up. She is a right-handed 67 year-old woman   Loss of postural reflexes, postural instability, decreased blinking, bradykinesia and paucity of movement, but no tremor at rest, no pill-rolling hand tremor, no rigidity of movement, no frequent falls, no depression, no anxiety, no skin rashes and no akinesia.   She experiences noticeable benefit from the medications.  By report, there is good compliance with treatment, good tolerance of treatment and good symptom control.   Previous medications included: rasagiline,~pramipexole,~rotigitine~and~.    She reports falls, but~no balance problems. She reports 2 falls per year.   She reports no speech problems~and~no dysphagia.   She reports urinary incontinence,~no urinary retention,~constipation,~no fecal incontinence~and~orthostatic symptoms.   She reports she has no symptoms of depression~and~no anxiety~. gambling.   She reports difficulty falling asleep,~difficulty staying asleep~and~restless legs, but~no acting out of dreams.   She reports concern about memory, but~no hallucinations~and~no delusions. She does not drive and there is no concern about her driving.      She reports no recent physical therapy~and~no recent occupational therapy. She exercises.       Review of Systems  All systems reviewed and are negative except as mentioned in the  HPI.        Active Problems  Problems    · Abdominal bloating (787.3) (R14.0)   · Acute bacterial bronchitis (466.0,041.9) (J20.8,B96.89)   · Arthritis (716.90) (M19.90)   · Atrophic vaginitis (627.3) (N95.2)   · Back pain, chronic (724.5,338.29) (M54.9,G89.29)   · Borderline hyperlipidemia (272.4) (E78.5)   · Breast cancer screening by mammogram (V76.12) (Z12.31)   · Bronchiectasis (494.0) (J47.9)   · Chronic constipation (564.00) (K59.09)   · Chronic cough (786.2) (R05.3)   · Chronic GERD (530.81) (K21.9)   · Chronic low back pain (724.2,338.29) (M54.50,G89.29)   · Chronic sinusitis (473.9) (J32.9)   · Dr Boyd   · Colon cancer screening (V76.51) (Z12.11)   · Contusion of ribs, left, sequela (906.3) (S20.212S)   · Contusion, shoulder and upper arm, multiple sites, left, subsequent encounter  (V58.89,923.09) (S40.012D,S40.022D)   · CVI (common variable immunodeficiency) (279.06) (D83.9)   · Dysphagia (787.20) (R13.10)   · Encounter for Medicare annual wellness exam (V70.0) (Z00.00)   · Fibroid, uterine (218.9) (D25.9)   · GERD (gastroesophageal reflux disease) (530.81) (K21.9)   · Long term use of drug (V58.69) (Z79.899)   · MCTD (mixed connective tissue disease) (710.8) (M35.1)   · Medicare annual wellness visit, initial (V70.0) (Z00.00)   · Migraine without aura and without status migrainosus, not intractable (346.10) (G43.009)   · Other dysphagia (787.29) (R13.19)   · Overactive bladder (596.51) (N32.81)   · Overweight with body mass index (BMI) of 27 to 27.9 in adult (278.02,V85.23)  (E66.3,Z68.27)   · Palpitations (785.1) (R00.2)   · Parkinson's disease (332.0) (G20)   · Pulmonary nodules (793.19) (R91.8)   · Managed by Dr. Laguna,  Pulmonology   · Raynaud's syndrome (443.0) (I73.00)   · RLS (restless legs syndrome) (333.94) (G25.81)   · Scleroderma (710.1) (M34.9)   · Scoliosis (737.30) (M41.9)   · Senile osteoporosis (733.01) (M81.0)   · Somatic dysfunction of lumbosacral region (739.3) (M99.03)   ·  Somatic dysfunction of pelvic region (739.5) (M99.05)   · Strain of thoracic region, subsequent encounter (V58.89,847.1) (S29.019D)   · Systemic lupus erythematosus (710.0) (M32.9)   · Thoracic myofascial strain (847.1) (S29.019A)   · Vasomotor instability (780.2) (R55)   · Venous insufficiency (chronic) (peripheral) (459.81) (I87.2)   · Vitamin D deficiency (268.9) (E55.9)     Past Medical History  Problems    · 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 Napier   · History of Bilateral edema of lower extremity (782.3) (R60.0)   · Resolved Date: 19 Dec 2022   · History of COVID-19 virus infection (079.89) (U07.1)   · Resolved Date: 19 Dec 2022   · History of Dysphagia, idiopathic (787.20) (R13.10)   · History of asthma (V12.69) (Z87.09)   · Resolved Date: 05 Jan 2020   · History of hiatal hernia (V12.79) (Z87.19)   · Resolved Date: 26 Dec 2018   · History of hiatal hernia (V12.79) (Z87.19)   · Resolved Date: 10 Apr 2023   · History of hyperlipidemia (V12.29) (Z86.39)   · Resolved Date: 26 Dec 2018   · History of hypothyroidism (V12.29) (Z86.39)   · Resolved Date: 26 Dec 2018   · History of malignant neoplasm of skin (V10.83) (Z85.828)   · right shoulder-Dr. Ott   · History of osteoarthritis (V13.4) (Z87.39)   · History of pneumonia due to Pseudomonas (V12.61) (Z87.01)   · Resolved Date: 22 Jun 2020   · History of pneumonia due to Pseudomonas (V12.61) (Z87.01)   · Resolved Date: 21 Jun 2022   · History of screening mammography (V15.89) (Z92.89)   · Mammo 2009   · History of Mycobacterium avium-intracellulare complex (031.2) (A31.0)   · Feb 2015, Dx by bronchoscopic culture, Dr. Whitten   · History of Pap smear for cervical cancer screening (V76.2) (Z12.4)   · PAP 8/2010   · History of Parkinsonism (332.0) (G20)   · Resolved Date: 21 Jun 2022   · History of Raynaud disease (443.0) (I73.00)     Surgical History  Problems    · History of Biopsy Pleural   · History of  Complete Colonoscopy   · 2013   · History of Esophagogastric Fundoplasty Nissen Fundoplication   · 2018 June   · Denied: History of Hemorrhoidectomy By Rubber Band Ligation   · History of Nissen fundoplication laparoscopic   · 2019   · History of Right Breast Lumpectomy   fatty tumor tissue was removed ( 2 dense tissues areas in number)   · History of Shoulder Repair   · History of Sinus Surgery   · History of Tonsillectomy   · History of Treatment Of Jaw     Family History  Mother    · Family history of cerebrovascular accident (CVA) (V17.1) (Z82.3)  Father    · Family history of chronic obstructive pulmonary disease (V17.6) (Z82.5)  Brother    · Family history of malignant neoplasm of prostate (V16.42) (Z80.42)   Brother     Social History  Problems    · Employed   · teacher   ·    · Never a smoker   · No alcohol use   · No illicit drug use   · Occasional caffeine consumption     Allergies  Medication    · doxycycline   Adverse Reaction; burning sension; Dyspepsia; Recorded By: Moraima Bautista; 10/2/2020 11:14:14 AM   · Topamax   Fatigue; Nausea; Palpitations; Recorded By: Miguel Alvarado; 9/28/2020 11:53:17 PM  NonMedication    · No Known Food Allergies   Recorded By: Adrianna Vincent; 3/13/2018 9:29:13 AM        Physical Exam  GENERAL APPEARANCE: No distress, alert, interactive and cooperative.      CARDIOVASCULAR: Regular rate and rhythm. Pulses +2 and equal in all extremities.      MENTAL STATE:   Orientation was normal. Recent and remote memory was intact. Attention span and concentration were normal. Language testing was normal for comprehension and expression. General fund of knowledge was intact.      CRANIAL NERVES:   CN 2    Visual fields full to confrontation.   CN 3, 4, 6   Pupils round, 4 mm in diameter, equally reactive to light. Lids symmetric; no ptosis. EOMs normal alignment, full range with normal saccades and pursuit. No nystagmus.   CN 5   Facial sensation intact bilaterally.   CN 7   Normal and  symmetric facial strength. Nasolabial folds symmetric.   CN 8   Hearing intact to voice.   CN 9/10  Palate elevates symmetrically.   CN 11   Normal strength of shoulder shrug.  CN 12   Tongue midline; no fasciculations.      MOTOR:   No noticeable rest tremor today on LUE or jaw, mild tremor RUE. No head tremor.  Mild fine postural tremor of RUE.   Tone is normal throughout.  Mild RUE>LUE bradykinesia of finger taps, hand movements  Normal toe and heel tapping bilat      R L  Deltoids 5 5  Biceps 4+ 4+  Triceps 4+ 4+  interossei 5 5  Finger ext 5 5  Handgrip 5 5     Hip Flex 5 5  Knee Flex 5 5  Knee Ext 5 5  Dorsiflex 5 5  Plantarflex 5 5     REFLEXES:    R L  BR: 2 2  Biceps: 3 3  Pec 2 2  Triceps: 2 2  Knee: 2 2  Ankle: 2 1     Plantars: R down, L down     SENSORY:   In both upper and lower extremities, sensation was intact to light touch     COORDINATION:   In both upper extremities, finger-nose-finger was intact without dysmetria or overshoot. Normal heel-to-shin.     GAIT:   Stands without pushing. Stooped with anterocollis. Reduced R>L arm swing. Stride is fair. Negative Romberg. + retropulsion - unable to move feet; upper body fell toward writer needing to be caught      UPDRS Examination   Time of exam: 15:00. The patient is currently on medication. Medication given: Pramipexole.   Stimulator State: N/A   Speech: 0.5 Facial Expression: 0.5   Rest Tremor: Head: 0 RUE: 0.5 LUE: 0 RLE: 0 LLE: 0   Action Tremor: RUE: 0.5 LUE: 0   Rigidity: Neck: 0 RUE: 0 LUE: 0 RLE: 0 LLE: 0   Finger Taps: RUE: 1 LUE: 0.5   Hand Movements: RUE: 1 LUE: 0.5   Rapid Alternating Movements: RUE: 0 LUE: 0   Leg Agility: RLE: 0   Arising from chair: 0 Posture: 2 Gait: 1 Postural Stability: 2 Body Bradykinsia: 1   Deisy and Yahr Stage:     Provider Impressions     65 yo right handed female with hx of TMJ surgery 20 years ago, CVID on IVIG replacement, recurrent chest infection (MAC and pseudomonas), and MCTD on plaquenil who presents  with 6-month hx of rest tremor of the jaw and both hands. Admitted to dream enactment, constipation, and poor balance. Her exam reveals very subtle parkinsonism with constant jaw rest tremor, intermittent rest tremor of BUE, very subtle bradykinesia on the right side, stooping, retropulsion, and decreased arm swing. Most likely idiopathic tremor predominant PD but given her significant PMH we will obtain a brain MRI WWO to rule out secondary parkinsonism. We will start her on a small dose of mirapex to see if it can help her tremor. Follow up after one month.      12/07/2020: MRI came back normal except for incidental pontine capillary telengectasia. she responded well to mirapex without side effects. exam slightly better.      06/07/2021: has some tremor in the morning and when nervous. Has achy legs at night that keeps her from sleeping. no side effects from mirapex. will increase dose to 0.5 mg and add a night time dose for RLS.      12/06/2021: has some tremor in the morning especially chin quivering. His RLS is still bothersome and keeps her from sleeping. no side effects from mirapex but peripheral visual illusions are becoming more frequent. will increase night-time dose.      6/7/2022: Taking pramipexole 0.25mg TID then 1mg qHS (different from Rx'd in our system). Tremor only when anxious, tolerating pramipexole. Did start gambling after starting pramipexole but thinks it's under control. Lost total $300. No excessive shopping. Has peripheral visual illusions, stable, once a day. Exercise 3 times a week. Starting to golf. Sleep is very poor â€“ 1 to 2 hours per night with significant daytime sleepiness. Has orthostatic symptoms. will keep pramipexole at the same dose while carefully watching her online gambling habits (she will keep a diary of losses). will add remeron for insomnia and will instate mechanical measures for orthostatic hypotension.      12/06/2022: remeron helped her sleep but she stopped  shortly after starting due to excessive weight gain. She has been taking one tablet of mirapex 0.5 mg in the morning and one tablet of mirapex 1 mg at night. Her tremor is under good control except when she first wakes up in the morning. she continues with online gambling but only plays free games and has not lost any money. she no longer has visual hallucinations. will adjust her mirapex dosing.     07/17/2023: tremor still under good control. struggles with more stooping and low back pain (the latter is chronic from known lumbar disc disease). Doing more online gambling lately (not free) but hasn't been losing money. balance is getting worse. had at least four recent falls during which she injured herself. wording finding difficulty is more prominent. will hold off on further mirapex increase and we may need to switch her to sinemet soon if she she starts losing money gambling. will refer her to PT and medical spine.    1/15/2024: tremor still under good control except when stressed. . struggles with more stooping and low back pain (the latter is chronic from known lumbar disc disease). Doing online gambling (not free) but hasn't been losing money. Word finding difficulty getting worse. Exercising. Will keep mirapex the same.      PLAN:  I'm glad your tremor continue to be under fair control.      please continue the same dose of mirapex for now. Please monitor your online gambling habits and report any major changes to the clinic. we may need to switch you soon to carbidopa/levodopa, which has less impact on impulse control disorder and is better tolerated above the age of 70.     It is very important that you do fast paced exercise preferably cycling on a recumbent stationary bike to slow down disease progression.      follow up after eight months.        The impression and plan were discussed with the patient and their family (if present) in detail and all questions answered. They agreed to the plan as outlined  above.     I spent 20 minutes with this patient. Greater than 50% of this time was spent in counseling and/or coordination of care.     Candido Will MD, PhD   of Neurology  [facility] [employer]  Director of Neuroimmunology and staff neurologist at the [facility]  [facility]

## 2024-01-15 NOTE — PATIENT INSTRUCTIONS
I'm glad your tremor continue to be under fair control.      please continue the same dose of mirapex for now. Please monitor your online gambling habits and report any major changes to the clinic. we may need to switch you soon to carbidopa/levodopa, which has less impact on impulse control disorder and is better tolerated above the age of 70.     It is very important that you do fast paced exercise preferably cycling on a recumbent stationary bike to slow down disease progression.      follow up after eight months.      Thank you very much for coming to see me today.        Candido Will MD, PhD   of Neurology  [facility] [employer]  Director of Neuroimmunology and staff neurologist at the [facility]  [facility]

## 2024-01-25 DIAGNOSIS — R73.03 PRE-DIABETES: Primary | ICD-10-CM

## 2024-02-02 DIAGNOSIS — J47.1 BRONCHIECTASIS WITH ACUTE EXACERBATION (MULTI): Primary | ICD-10-CM

## 2024-02-05 ENCOUNTER — HOSPITAL ENCOUNTER (OUTPATIENT)
Dept: RADIOLOGY | Facility: CLINIC | Age: 69
Discharge: HOME | End: 2024-02-05
Payer: MEDICARE

## 2024-02-05 DIAGNOSIS — M81.0 AGE-RELATED OSTEOPOROSIS WITHOUT CURRENT PATHOLOGICAL FRACTURE: Primary | ICD-10-CM

## 2024-02-05 DIAGNOSIS — Z12.31 SCREENING MAMMOGRAM, ENCOUNTER FOR: ICD-10-CM

## 2024-02-05 PROCEDURE — 77067 SCR MAMMO BI INCL CAD: CPT

## 2024-02-05 PROCEDURE — 77063 BREAST TOMOSYNTHESIS BI: CPT | Performed by: RADIOLOGY

## 2024-02-05 PROCEDURE — 77067 SCR MAMMO BI INCL CAD: CPT | Performed by: RADIOLOGY

## 2024-02-05 NOTE — PROGRESS NOTES
Pulmonary Consult    Request of Pulmonary Consult by Miguel Alvarado DO,  to evaluate Oneyda LANCE Ghulam is a 68 y.o. year old female for cough and abnormal CT chest. I have independently interviewed and examined the patient in the office and reviewed available records.    Physician HPI (2/25/2024):  Ms. CHAMBERS is a 66 year old woman with CVID on monthly IVIG, MCTD, low back pain, chronic pseudomonas pulmonary infection and recent diagnosis of parkinson and RL  She complains of recurrent sinus infection, green sputum copious in amount, associated with cough and expectoration , she has exertional SOB but able to do all her activities with daily living, but her movement had been slow lately she was diagnosed with Mixed connective tissue disease and she was following with Rheumatologist for sometimes but has not be following lately (for the last year) as her rheumatologist left the practice she was maintained on plaquenil and Azulfidine  she has a CT chest done her PCP that showed bronchiectasis with tree on bud appearance, she was diagnosed before with pseudomonas pneumonia    ROS:  Skin: S/P squamous cell cancer resection, no skin rash but positive for Raynaud's phenomenon  Musculoskeletal:  Back pain and joint pain , had nerve block injection  Neurology   Tremors, back pain, Loss of postural reflexes, postural instability, decreased blinking, bradykinesia and paucity of movement, but no tremor at rest, no pill-rolling hand tremor, no rigidity of movement, no frequent falls, no depression, no anxiety, no skin rashes and no akinesia.   She experiences noticeable benefit from the medications.  By report, there is good compliance with treatment, good tolerance of treatment and good symptom control.   Urinary system:  She reports urinary incontinence,no urinary retention  GIT  Difficulty with swallowing, constipation,no fecal incontinence and orthostatic symptoms.   Psych:  She reports she has no symptoms of depression and  no anxiety  She reports difficulty falling asleep,difficulty staying asleep and restless legs, but no acting out of dreams.   She reports no recent physical therapy~and~no recent occupational therapy. She exercises.      Social:   retired   Non smoker  Immunization History:  Immunization History   Administered Date(s) Administered    Influenza, Seasonal, Quadrivalent, Adjuvanted 12/01/2021, 12/02/2022, 10/04/2023    Influenza, Unspecified 11/05/2018, 12/01/2021, 11/23/2022    Influenza, seasonal, injectable 10/15/2018, 11/23/2022    Influenza, trivalent, adjuvanted 10/07/2020    Meningococcal MCV4P 02/21/2018    Pfizer Purple Cap SARS-CoV-2 02/25/2021, 03/18/2021, 08/20/2021    Pneumococcal conjugate vaccine, 20-valent (PREVNAR 20) 10/01/2023       Family History:  Family History   Problem Relation Name Age of Onset    Other (cerebrovascular accident) Mother      COPD Father      Prostate cancer Brother         Social History:  Social History     Socioeconomic History    Marital status:      Spouse name: None    Number of children: None    Years of education: None    Highest education level: None   Occupational History    None   Tobacco Use    Smoking status: Never     Passive exposure: Never    Smokeless tobacco: Never   Substance and Sexual Activity    Alcohol use: Not Currently    Drug use: Never    Sexual activity: None   Other Topics Concern    None   Social History Narrative    None     Social Determinants of Health     Financial Resource Strain: Not on file   Food Insecurity: Not on file   Transportation Needs: Not on file   Physical Activity: Not on file   Stress: Not on file   Social Connections: Not on file   Intimate Partner Violence: Not on file   Housing Stability: Not on file       Current Medications:  Current Outpatient Medications   Medication Instructions    amLODIPine (NORVASC) 2.5 mg, oral, Daily    hydroxychloroquine (PLAQUENIL) 200 mg, oral, Daily    ibuprofen 600 mg, oral,  "Every 8 hours PRN    immune globulin, human, (Gammagard Liquid) infusion Infuse monthly as directed    linaCLOtide (LINZESS) 290 mcg, oral, Daily    NIFEdipine ER (ADALAT CC) 30 mg, oral, Daily    pramipexole (MIRAPEX) 0.5 mg, oral, 3 times daily    pramipexole (MIRAPEX) 1 mg, oral, Nightly    propranolol LA (INDERAL LA) 80 mg, oral, Daily    rizatriptan (Maxalt) 10 mg tablet One po at onset of migraine; can repeat in 2 hrs;  max 20mg daily    semaglutide (OZEMPIC) 1 mg, subcutaneous, Weekly    sulfaSALAzine (AZULFIDINE) 1,000 mg, oral, Daily, 2 tabs once a day    Veozah 45 mg, oral, Daily        Drug Allergies/Intolerances:  Allergies   Allergen Reactions    Doxycycline Other and Unknown    Prednisone Unknown     Red and hot when taking prednisone    Topiramate Other, Nausea Only, Palpitations and Unknown        Review of Systems:  As mentioned above    Physical Examination:  /63   Pulse 68   Temp 36.9 °C (98.4 °F) (Temporal)   Ht 1.651 m (5' 5\")   Wt 74.4 kg (164 lb)   SpO2 95%   BMI 27.29 kg/m²      General: ambulated independently; no acute distress; well-nourished; work of breathing was not increased; normal vocal character  HEENT: normocephalic; anicteric sclerae; conjunctivae not injected; nasal mucosa was unremarkable; oropharynx was clear without evidence of thrush; dentition was good.  Neck: supple; no lymphadenopathy or thyromegaly.  Chest: clear to auscultation bilaterally; no chest wall deformity.  Cardiac: regular rhythm; no gallop or murmur.  Abdomen: soft; non-tender; non-distended; no hepatosplenomegaly.  Extremities: no leg edema; no digital clubbing; 2+ pulses  Psychiatric: did not appear depressed or anxious.    Pulmonary Function Test Results       1/23/18      Chest CT Scan     4/13/23    Study Result    Narrative & Impression   Interpreted By:  COMFORT QUEZADA MD  MRN: [id number]  Patient Name: GOLDEN CHAMBERS     STUDY:  CT CHEST WO CONTRAST;  4/12/2023 2:57 pm     INDICATION:  3 mos " follow up surveilllance CT  R91.8: Pulmonary nodules.     COMPARISON:  12/12/2022     ACCESSION NUMBER(S):  [id number]     ORDERING CLINICIAN:  RAINE KEN     TECHNIQUE:  Helical data acquisition of the chest was obtained without the use of  IV contrast. Images were reformatted in axial, coronal, and sagittal  planes.     FINDINGS:  POTENTIAL LIMITATIONS OF THE STUDY:   Lack of IV contrast     HEART AND VESSELS:     There are mild atherosclerotic calcifications of the aorta and  branches. Aorta is unchanged in course and caliber.     The heart is not significantly enlarged.     No pericardial effusion is seen.     MEDIASTINUM AND NEENA, LOWER NECK AND AXILLA:  The visualized thyroid gland is within normal limits.     No evidence of thoracic lymphadenopathy by CT criteria.     Esophagus is stable in course and caliber. Small hiatal hernia.     LUNGS AND AIRWAYS:  The trachea and central airways are patent. No endobronchial lesion.     There are scattered tree-in-bud reticulonodular opacities, similar  distribution when compared to the previous study. There is also some  mucous plugging, most conspicuous in the lingula and right middle  lobe. No new areas of consolidation. No evidence of an effusion or  pneumothorax. There are calcified granulomas. Stable nodules  measuring 4 mm or less in size, for example, image 143 of 318.  Previously described spiculated nodule in the left upper lobe has  nearly completely resolved and likely represented an infectious or  inflammatory process.     UPPER ABDOMEN:  The visualized subdiaphragmatic structures demonstrate no acute  abnormality.     CHEST WALL AND OSSEOUS STRUCTURES:  Degenerative changes. No acute process.     IMPRESSION:  Scattered reticulonodular tree-in-bud opacities, nonspecific, but  suggesting an infectious or inflammatory bronchiolitis, not  significantly changed when compared to the previous study.  Near complete resolution of the previously described  spiculated  nodule within the left upper lobe, most likely  infectious/inflammatory in nature.          Co-morbidities Problem List    Assessment and Plan / Recommendations:  Problem List Items Addressed This Visit       Bronchiectasis (CMS/HCC) - Primary    Relevant Medications    amLODIPine (Norvasc) 2.5 mg tablet    Other Relevant Orders    Respiratory Culture/Smear (Completed)    Sedimentation rate, automated (Completed)    C-reactive protein (Completed)    AFB Culture/Smear (Completed)    Respiratory Culture/Smear (Completed)    Mycobacterium TB Complex Detection and Rifampin Resistance by PCR, Sputum    AFB Culture/Smear (Completed)    MCTD (mixed connective tissue disease) (CMS/HCC)    Raynaud's syndrome      Bronchiectasis exacerbation:  Abnormal CT chest shows bronchiectasis with tree-in-bud appearance  Positive bronchitic symptoms in the form of cough and copious sputum  Can be due to recurrent bacterial infection versus atypical mycobacterial infection  Recommend:  Will be started on amoxicillin clavulanic acid for 7 to 10 days  Sputum culture and sensitivity  Sputum for AFB and culture  Mycobacterial avium intracellular PCR in the sputum  Chest PT  Consideration to start inhaled tobramycin if sputum cultures are persistent Pseudomonas positive    Mixed connective tissue disease:  On Plaquenil and sulfasalazine  Not following with rheumatology  Will check ESR C-reactive protein  To be referred for rheumatology    Raynaud's phenomena:  Will start her on amlodipine small dose 2.5 mg tablet    Other medical problems :  tremors: Secondary to early Parkinson's  Migraine  Dysphagia  Overactive bladder  Neck pain    I saw and evaluated the patient. I personally obtained the key and critical portions of the history and physical exam or was physically present for key and critical portions . I have independently interviewed and examined the patient in the office and reviewed available records.  I personally  reviewed CT images and lab reports      Mary Duenas MD  02/06/2024

## 2024-02-06 ENCOUNTER — OFFICE VISIT (OUTPATIENT)
Dept: PULMONOLOGY | Facility: CLINIC | Age: 69
End: 2024-02-06
Payer: MEDICARE

## 2024-02-06 VITALS
TEMPERATURE: 98.4 F | HEIGHT: 65 IN | BODY MASS INDEX: 27.32 KG/M2 | SYSTOLIC BLOOD PRESSURE: 105 MMHG | WEIGHT: 164 LBS | OXYGEN SATURATION: 95 % | HEART RATE: 68 BPM | DIASTOLIC BLOOD PRESSURE: 63 MMHG

## 2024-02-06 DIAGNOSIS — M35.1 MCTD (MIXED CONNECTIVE TISSUE DISEASE) (MULTI): ICD-10-CM

## 2024-02-06 DIAGNOSIS — J47.1 BRONCHIECTASIS WITH ACUTE EXACERBATION (MULTI): Primary | ICD-10-CM

## 2024-02-06 DIAGNOSIS — I73.00 RAYNAUD'S DISEASE WITHOUT GANGRENE: ICD-10-CM

## 2024-02-06 PROCEDURE — 1159F MED LIST DOCD IN RCRD: CPT | Performed by: INTERNAL MEDICINE

## 2024-02-06 PROCEDURE — 99205 OFFICE O/P NEW HI 60 MIN: CPT | Performed by: INTERNAL MEDICINE

## 2024-02-06 PROCEDURE — 1157F ADVNC CARE PLAN IN RCRD: CPT | Performed by: INTERNAL MEDICINE

## 2024-02-06 PROCEDURE — 1160F RVW MEDS BY RX/DR IN RCRD: CPT | Performed by: INTERNAL MEDICINE

## 2024-02-06 PROCEDURE — 1126F AMNT PAIN NOTED NONE PRSNT: CPT | Performed by: INTERNAL MEDICINE

## 2024-02-06 PROCEDURE — 1036F TOBACCO NON-USER: CPT | Performed by: INTERNAL MEDICINE

## 2024-02-06 RX ORDER — AMOXICILLIN AND CLAVULANATE POTASSIUM 500; 125 MG/1; MG/1
500 TABLET, FILM COATED ORAL 3 TIMES DAILY
Qty: 42 TABLET | Refills: 0 | Status: SHIPPED | OUTPATIENT
Start: 2024-02-06 | End: 2024-02-20

## 2024-02-06 RX ORDER — AMLODIPINE BESYLATE 2.5 MG/1
2.5 TABLET ORAL DAILY
Qty: 30 TABLET | Refills: 11 | Status: SHIPPED | OUTPATIENT
Start: 2024-02-06 | End: 2025-02-05

## 2024-02-06 ASSESSMENT — PATIENT HEALTH QUESTIONNAIRE - PHQ9
2. FEELING DOWN, DEPRESSED OR HOPELESS: NOT AT ALL
SUM OF ALL RESPONSES TO PHQ9 QUESTIONS 1 AND 2: 0
1. LITTLE INTEREST OR PLEASURE IN DOING THINGS: NOT AT ALL

## 2024-02-08 ENCOUNTER — LAB (OUTPATIENT)
Dept: LAB | Facility: LAB | Age: 69
End: 2024-02-08
Payer: MEDICARE

## 2024-02-08 DIAGNOSIS — J47.1 BRONCHIECTASIS WITH ACUTE EXACERBATION (MULTI): ICD-10-CM

## 2024-02-08 LAB
CRP SERPL-MCNC: <0.1 MG/DL
ERYTHROCYTE [SEDIMENTATION RATE] IN BLOOD BY WESTERGREN METHOD: 3 MM/H (ref 0–30)

## 2024-02-08 PROCEDURE — 87205 SMEAR GRAM STAIN: CPT

## 2024-02-08 PROCEDURE — 87116 MYCOBACTERIA CULTURE: CPT

## 2024-02-08 PROCEDURE — 87206 SMEAR FLUORESCENT/ACID STAI: CPT

## 2024-02-08 PROCEDURE — 36415 COLL VENOUS BLD VENIPUNCTURE: CPT

## 2024-02-08 PROCEDURE — 87015 SPECIMEN INFECT AGNT CONCNTJ: CPT

## 2024-02-08 PROCEDURE — 86140 C-REACTIVE PROTEIN: CPT

## 2024-02-08 PROCEDURE — 85652 RBC SED RATE AUTOMATED: CPT

## 2024-02-09 LAB
BACTERIA SPEC RESP CULT: ABNORMAL
GRAM STN SPEC: ABNORMAL

## 2024-02-12 ENCOUNTER — APPOINTMENT (OUTPATIENT)
Dept: PULMONOLOGY | Facility: CLINIC | Age: 69
End: 2024-02-12
Payer: MEDICARE

## 2024-02-12 ENCOUNTER — HOSPITAL ENCOUNTER (OUTPATIENT)
Dept: RADIOLOGY | Facility: HOSPITAL | Age: 69
Discharge: HOME | End: 2024-02-12
Payer: MEDICARE

## 2024-02-12 DIAGNOSIS — J47.0 BRONCHIECTASIS WITH ACUTE LOWER RESPIRATORY INFECTION (MULTI): ICD-10-CM

## 2024-02-12 DIAGNOSIS — M81.0 AGE-RELATED OSTEOPOROSIS WITHOUT CURRENT PATHOLOGICAL FRACTURE: ICD-10-CM

## 2024-02-12 PROCEDURE — 77080 DXA BONE DENSITY AXIAL: CPT

## 2024-02-12 PROCEDURE — 77080 DXA BONE DENSITY AXIAL: CPT | Performed by: RADIOLOGY

## 2024-02-15 ENCOUNTER — LAB (OUTPATIENT)
Dept: LAB | Facility: LAB | Age: 69
End: 2024-02-15
Payer: MEDICARE

## 2024-02-15 DIAGNOSIS — J47.0 BRONCHIECTASIS WITH ACUTE LOWER RESPIRATORY INFECTION (MULTI): ICD-10-CM

## 2024-02-15 PROCEDURE — 87015 SPECIMEN INFECT AGNT CONCNTJ: CPT

## 2024-02-15 PROCEDURE — 87206 SMEAR FLUORESCENT/ACID STAI: CPT

## 2024-02-15 PROCEDURE — 87116 MYCOBACTERIA CULTURE: CPT

## 2024-02-23 PROCEDURE — 87205 SMEAR GRAM STAIN: CPT | Performed by: INTERNAL MEDICINE

## 2024-02-23 PROCEDURE — 87798 DETECT AGENT NOS DNA AMP: CPT | Performed by: INTERNAL MEDICINE

## 2024-02-23 PROCEDURE — 87206 SMEAR FLUORESCENT/ACID STAI: CPT | Mod: 59 | Performed by: INTERNAL MEDICINE

## 2024-02-25 LAB
ANNOTATION COMMENT IMP: NORMAL
M TB DNA SPT QL NAA+PROBE: NOT DETECTED
MTB AND RIF RESISTANCE PNL ISLT/SPM: NORMAL

## 2024-02-25 RX ORDER — TOBRAMYCIN INHALATION SOLUTION 300 MG/5ML
300 INHALANT RESPIRATORY (INHALATION)
Qty: 300 ML | Refills: 0 | Status: SHIPPED | OUTPATIENT
Start: 2024-02-25 | End: 2024-03-29 | Stop reason: SDUPTHER

## 2024-02-26 ENCOUNTER — TELEPHONE (OUTPATIENT)
Dept: PULMONOLOGY | Facility: CLINIC | Age: 69
End: 2024-02-26
Payer: MEDICARE

## 2024-02-26 NOTE — TELEPHONE ENCOUNTER
Patient is returning your call from yesterday [phone]              19 mins  RA  Mary Duenas MD  thanks I called her again and prescribed inhaled tobramycin and nebulizer machine

## 2024-02-27 LAB
BACTERIA SPEC RESP CULT: ABNORMAL
BACTERIA SPEC RESP CULT: ABNORMAL
GRAM STN SPEC: ABNORMAL

## 2024-02-29 ENCOUNTER — TELEPHONE (OUTPATIENT)
Dept: PULMONOLOGY | Facility: CLINIC | Age: 69
End: 2024-02-29
Payer: MEDICARE

## 2024-02-29 NOTE — TELEPHONE ENCOUNTER
----- Message from Mary Duenas MD sent at 2/25/2024  2:01 PM EST -----  I put new orders for this patient   Inhaled tobramycin by nebulizer   And nebulizer machine order    Please make sure that the orders went through  Please call the patient to notify her about the new orders     Thanks,  Mary

## 2024-03-01 ENCOUNTER — TELEPHONE (OUTPATIENT)
Dept: PULMONOLOGY | Facility: CLINIC | Age: 69
End: 2024-03-01
Payer: MEDICARE

## 2024-03-01 NOTE — TELEPHONE ENCOUNTER
Received a call from Eneida from [facility].    They received an order from  for a nebulizer on 2/25/24 for a home nebulizer.    Eneida from [facility] stated that she received a Nebulizer from [facility] 2/2021.    So Eneida stated that if she needs supplies or something is wrong with the machine she needs to call [facility]. Their phone number is - [phone].      Called and spoke with the patient.    She stated that she found her machine from [facility].    She is waiting to get the nebulizer solution approved through her insurance.    They told her it has to get approved through Medicare and then [facility] and then there is paperwork she has to complete before she can get the medication.    Patient wanted to make you aware of the situation as you wanted her to start using the machine before her next visit with you on 3/18.    She is not sure how long its going to take to get the medication.    Will send to  as FYI.

## 2024-03-11 ENCOUNTER — TELEPHONE (OUTPATIENT)
Dept: PRIMARY CARE | Facility: CLINIC | Age: 69
End: 2024-03-11
Payer: MEDICARE

## 2024-03-11 DIAGNOSIS — R30.0 DYSURIA: Primary | ICD-10-CM

## 2024-03-11 NOTE — TELEPHONE ENCOUNTER
Patient called and stated she has dark orange urine with particles. She was wondering if you could order a urine test for her.

## 2024-03-13 ENCOUNTER — LAB (OUTPATIENT)
Dept: LAB | Facility: LAB | Age: 69
End: 2024-03-13
Payer: MEDICARE

## 2024-03-13 DIAGNOSIS — R30.0 DYSURIA: ICD-10-CM

## 2024-03-13 PROCEDURE — 87086 URINE CULTURE/COLONY COUNT: CPT

## 2024-03-14 LAB — BACTERIA UR CULT: NO GROWTH

## 2024-03-15 NOTE — PROGRESS NOTES
Pulmonary Consult    Request of Pulmonary Consult by No ref. provider found,  to evaluate Oneyda Parmar is a 68 y.o. year old female for cough and abnormal CT chest. I have independently interviewed and examined the patient in the office and reviewed available records.    Physician HPI (3/18/2024):  Ms. PARMAR is a 66 year old woman with CVID on monthly IVIG, MCTD, low back pain, chronic pseudomonas pulmonary infection and recent diagnosis of parkinson and RL  She complains of recurrent sinus infection, cough and expectoration of green sputum copious in amount, associated with cough and expectoration , she has exertional SOB but able to do all her activities with daily living, but her movement had been slow latel.  she was diagnosed with Mixed connective tissue disease and she was following with Rheumatologist for sometimes but has not be following lately (for the last year) as her rheumatologist left the practice she was maintained on plaquenil and Azulfidine, not seen an eye doctor for a while.  she has a CT chest done her PCP that showed bronchiectasis with tree on bud appearance,     sputum culture is positive for pseudomonas pneumonia negative for MAC  She had a recent ER visit with chest infection  Previous visit: she was prescribed Augmentin and tobramycin(did not received it)      ROS:  Lost 21 pounds in 8 weeks with ozempic  Skin: S/P squamous cell cancer resection, no skin rash but positive for Raynaud's phenomenon  Musculoskeletal:  Back pain and joint pain , had nerve block injection  Neurology   Tremors, back pain, Loss of postural reflexes, postural instability, decreased blinking, bradykinesia and paucity of movement, but no tremor at rest, no pill-rolling hand tremor, no rigidity of movement, no frequent falls, no depression, no anxiety, no skin rashes and no akinesia.   She experiences noticeable benefit from the medications.  By report, there is good compliance with treatment, good tolerance of  treatment and good symptom control.   Urinary system:  She reports urinary incontinence,no urinary retention  GIT  Difficulty with swallowing, constipation,no fecal incontinence and orthostatic symptoms.   Psych:  She reports she has no symptoms of depression and no anxiety  She reports difficulty falling asleep,difficulty staying asleep and restless legs, but no acting out of dreams.   She reports no recent physical therapy~and~no recent occupational therapy. She exercises.      Social:   retired   Non smoker  Immunization History:  Immunization History   Administered Date(s) Administered    Influenza, Seasonal, Quadrivalent, Adjuvanted 12/01/2021, 12/02/2022, 10/04/2023    Influenza, Unspecified 11/05/2018, 12/01/2021, 11/23/2022    Influenza, seasonal, injectable 10/15/2018, 11/23/2022    Influenza, trivalent, adjuvanted 10/07/2020    Meningococcal MCV4P 02/21/2018    Pfizer Purple Cap SARS-CoV-2 02/25/2021, 03/18/2021, 08/20/2021    Pneumococcal conjugate vaccine, 20-valent (PREVNAR 20) 10/01/2023       Family History:  Family History   Problem Relation Name Age of Onset    Other (cerebrovascular accident) Mother      COPD Father      Prostate cancer Brother         Social History:  Social History     Socioeconomic History    Marital status:      Spouse name: None    Number of children: None    Years of education: None    Highest education level: None   Occupational History    None   Tobacco Use    Smoking status: Never     Passive exposure: Never    Smokeless tobacco: Never   Substance and Sexual Activity    Alcohol use: Not Currently    Drug use: Never    Sexual activity: None   Other Topics Concern    None   Social History Narrative    None     Social Determinants of Health     Financial Resource Strain: Not on file   Food Insecurity: Not on file   Transportation Needs: Not on file   Physical Activity: Not on file   Stress: Not on file   Social Connections: Not on file   Intimate Partner  "Violence: Not on file   Housing Stability: Not on file       Current Medications:  Current Outpatient Medications   Medication Instructions    amLODIPine (NORVASC) 2.5 mg, oral, Daily    ciprofloxacin (CIPRO) 500 mg, oral, 2 times daily    ibuprofen 600 mg, oral, Every 8 hours PRN    immune globulin, human, (Gammagard Liquid) infusion Infuse monthly as directed    linaCLOtide (LINZESS) 290 mcg, oral, Daily    NIFEdipine ER (ADALAT CC) 30 mg, oral, Daily    pramipexole (MIRAPEX) 0.5 mg, oral, 3 times daily    pramipexole (MIRAPEX) 1 mg, oral, Nightly    propranolol LA (INDERAL LA) 80 mg, oral, Daily    rizatriptan (Maxalt) 10 mg tablet One po at onset of migraine; can repeat in 2 hrs;  max 20mg daily    semaglutide (OZEMPIC) 1 mg, subcutaneous, Weekly    sulfaSALAzine (AZULFIDINE) 1,000 mg, oral, Daily, 2 tabs once a day    tobramycin (Ashkan) 300 mg/5 mL nebulizer solution 300 mg, nebulization, 2 times daily RT    Veozah 45 mg, oral, Daily        Drug Allergies/Intolerances:  Allergies   Allergen Reactions    Doxycycline Other and Unknown    Prednisone Unknown     Red and hot when taking prednisone    Topiramate Other, Nausea Only, Palpitations and Unknown        Review of Systems:  As mentioned above    Physical Examination:  /64 (BP Location: Left arm, Patient Position: Sitting, BP Cuff Size: Adult)   Pulse 76   Temp 36.4 °C (97.5 °F) (Temporal)   Resp 16   Ht 1.676 m (5' 6\")   Wt 68.5 kg (151 lb)   BMI 24.37 kg/m²      General: ambulated independently; no acute distress; well-nourished; work of breathing was not increased; normal vocal character  HEENT: normocephalic; anicteric sclerae; conjunctivae not injected; nasal mucosa was unremarkable; oropharynx was clear without evidence of thrush; dentition was good.  Neck: supple; no lymphadenopathy or thyromegaly.  Chest: clear to auscultation bilaterally; no chest wall deformity.  Cardiac: regular rhythm; no gallop or murmur.  Abdomen: soft; non-tender; " non-distended; no hepatosplenomegaly.  Extremities: no leg edema; no digital clubbing; 2+ pulses  Psychiatric: did not appear depressed or anxious.    Pulmonary Function Test Results       1/23/18      Chest CT Scan     4/13/23    Study Result    Narrative & Impression   Interpreted By:  COMFORT QUEZADA MD  MRN: [id number]  Patient Name: GOLDEN CHAMBERS     STUDY:  CT CHEST WO CONTRAST;  4/12/2023 2:57 pm     INDICATION:  3 mos follow up surveilllance CT  R91.8: Pulmonary nodules.     COMPARISON:  12/12/2022     ACCESSION NUMBER(S):  [id number]     ORDERING CLINICIAN:  RAINE KEN     TECHNIQUE:  Helical data acquisition of the chest was obtained without the use of  IV contrast. Images were reformatted in axial, coronal, and sagittal  planes.     FINDINGS:  POTENTIAL LIMITATIONS OF THE STUDY:   Lack of IV contrast     HEART AND VESSELS:     There are mild atherosclerotic calcifications of the aorta and  branches. Aorta is unchanged in course and caliber.     The heart is not significantly enlarged.     No pericardial effusion is seen.     MEDIASTINUM AND NEENA, LOWER NECK AND AXILLA:  The visualized thyroid gland is within normal limits.     No evidence of thoracic lymphadenopathy by CT criteria.     Esophagus is stable in course and caliber. Small hiatal hernia.     LUNGS AND AIRWAYS:  The trachea and central airways are patent. No endobronchial lesion.     There are scattered tree-in-bud reticulonodular opacities, similar  distribution when compared to the previous study. There is also some  mucous plugging, most conspicuous in the lingula and right middle  lobe. No new areas of consolidation. No evidence of an effusion or  pneumothorax. There are calcified granulomas. Stable nodules  measuring 4 mm or less in size, for example, image 143 of 318.  Previously described spiculated nodule in the left upper lobe has  nearly completely resolved and likely represented an infectious or  inflammatory process.     UPPER  ABDOMEN:  The visualized subdiaphragmatic structures demonstrate no acute  abnormality.     CHEST WALL AND OSSEOUS STRUCTURES:  Degenerative changes. No acute process.     IMPRESSION:  Scattered reticulonodular tree-in-bud opacities, nonspecific, but  suggesting an infectious or inflammatory bronchiolitis, not  significantly changed when compared to the previous study.  Near complete resolution of the previously described spiculated  nodule within the left upper lobe, most likely  infectious/inflammatory in nature.          Co-morbidities Problem List    Assessment and Plan / Recommendations:  Problem List Items Addressed This Visit       Bronchiectasis (CMS/HCC) - Primary    Relevant Medications    ciprofloxacin (Cipro) 500 mg tablet    Other Relevant Orders    Referral to Rheumatology    Referral to Ophthalmology        Bronchiectasis exacerbation:  Abnormal CT chest shows bronchiectasis with tree-in-bud appearance  Positive bronchitic symptoms in the form of cough and copious sputum persistent  Received amoxicillin clavulanic acid for 7 to 10 days  Sputum culture and sensitivity positive for pseudomonus  Mycobacterial avium intracellular PCR in the sputum negative  Cw Chest PT  ordered inhaled tobramycin if sputum cultures are persistent Pseudomonas positive  Prescribed ciprofloxacin   Plaquenil on hold during cipro    Mixed connective tissue disease:  stable  On Plaquenil and sulfasalazine (plaquenil on hold)  Not following with rheumatology  Referred to rheumatology    Raynaud's phenomena:   amlodipine small dose 2.5 mg tablet  ordered but not started by the patient    Other medical problems :  tremors: Secondary to early Parkinson's  Migraine on propranolol  Dysphagia s/p esophageal dilatation and botox injection  Overactive bladder  Neck pain    I saw and evaluated the patient. I personally obtained the key and critical portions of the history and physical exam or was physically present for key and critical  portions . I have independently interviewed and examined the patient in the office and reviewed available records.  I personally reviewed CT images and lab report  Professional time of this encounter: 60 min    Mary Duenas MD  3/18/2024

## 2024-03-18 ENCOUNTER — OFFICE VISIT (OUTPATIENT)
Dept: PULMONOLOGY | Facility: CLINIC | Age: 69
End: 2024-03-18
Payer: MEDICARE

## 2024-03-18 VITALS
HEIGHT: 66 IN | SYSTOLIC BLOOD PRESSURE: 100 MMHG | BODY MASS INDEX: 24.27 KG/M2 | HEART RATE: 76 BPM | DIASTOLIC BLOOD PRESSURE: 64 MMHG | TEMPERATURE: 97.5 F | RESPIRATION RATE: 16 BRPM | WEIGHT: 151 LBS

## 2024-03-18 DIAGNOSIS — J47.1 BRONCHIECTASIS WITH ACUTE EXACERBATION (MULTI): Primary | ICD-10-CM

## 2024-03-18 DIAGNOSIS — M35.1 MIXED CONNECTIVE TISSUE DISEASE (MULTI): ICD-10-CM

## 2024-03-18 PROCEDURE — 1160F RVW MEDS BY RX/DR IN RCRD: CPT | Performed by: INTERNAL MEDICINE

## 2024-03-18 PROCEDURE — 1157F ADVNC CARE PLAN IN RCRD: CPT | Performed by: INTERNAL MEDICINE

## 2024-03-18 PROCEDURE — 1159F MED LIST DOCD IN RCRD: CPT | Performed by: INTERNAL MEDICINE

## 2024-03-18 PROCEDURE — 1036F TOBACCO NON-USER: CPT | Performed by: INTERNAL MEDICINE

## 2024-03-18 PROCEDURE — 99215 OFFICE O/P EST HI 40 MIN: CPT | Performed by: INTERNAL MEDICINE

## 2024-03-18 RX ORDER — CIPROFLOXACIN 500 MG/1
500 TABLET ORAL 2 TIMES DAILY
Qty: 20 TABLET | Refills: 0 | Status: SHIPPED | OUTPATIENT
Start: 2024-03-18 | End: 2024-03-28

## 2024-03-18 RX ORDER — CIPROFLOXACIN 500 MG/1
500 TABLET ORAL 2 TIMES DAILY
Qty: 20 TABLET | Refills: 0 | Status: SHIPPED | OUTPATIENT
Start: 2024-03-18 | End: 2024-03-18 | Stop reason: SDUPTHER

## 2024-03-29 DIAGNOSIS — J47.1 BRONCHIECTASIS WITH ACUTE EXACERBATION (MULTI): ICD-10-CM

## 2024-04-02 RX ORDER — TOBRAMYCIN INHALATION SOLUTION 300 MG/5ML
300 INHALANT RESPIRATORY (INHALATION)
Qty: 300 ML | Refills: 0 | Status: SHIPPED | OUTPATIENT
Start: 2024-04-02 | End: 2024-04-15 | Stop reason: SDUPTHER

## 2024-04-03 ENCOUNTER — ANESTHESIA EVENT (OUTPATIENT)
Dept: GASTROENTEROLOGY | Facility: HOSPITAL | Age: 69
End: 2024-04-03
Payer: MEDICARE

## 2024-04-06 ENCOUNTER — APPOINTMENT (OUTPATIENT)
Dept: RADIOLOGY | Facility: HOSPITAL | Age: 69
End: 2024-04-06
Payer: MEDICARE

## 2024-04-06 ENCOUNTER — HOSPITAL ENCOUNTER (EMERGENCY)
Facility: HOSPITAL | Age: 69
Discharge: HOME | End: 2024-04-06
Attending: EMERGENCY MEDICINE
Payer: MEDICARE

## 2024-04-06 VITALS
WEIGHT: 151.01 LBS | RESPIRATION RATE: 17 BRPM | DIASTOLIC BLOOD PRESSURE: 68 MMHG | TEMPERATURE: 97 F | BODY MASS INDEX: 24.27 KG/M2 | HEART RATE: 61 BPM | OXYGEN SATURATION: 98 % | HEIGHT: 66 IN | SYSTOLIC BLOOD PRESSURE: 143 MMHG

## 2024-04-06 DIAGNOSIS — B37.0 THRUSH OF MOUTH AND ESOPHAGUS (MULTI): Primary | ICD-10-CM

## 2024-04-06 DIAGNOSIS — B37.81 THRUSH OF MOUTH AND ESOPHAGUS (MULTI): Primary | ICD-10-CM

## 2024-04-06 LAB
ALBUMIN SERPL BCP-MCNC: 4.2 G/DL (ref 3.4–5)
ALP SERPL-CCNC: 83 U/L (ref 33–136)
ALT SERPL W P-5'-P-CCNC: 16 U/L (ref 7–45)
ANION GAP SERPL CALC-SCNC: 9 MMOL/L (ref 10–20)
AST SERPL W P-5'-P-CCNC: 25 U/L (ref 9–39)
BASOPHILS # BLD AUTO: 0.03 X10*3/UL (ref 0–0.1)
BASOPHILS NFR BLD AUTO: 0.7 %
BILIRUB SERPL-MCNC: 0.8 MG/DL (ref 0–1.2)
BUN SERPL-MCNC: 9 MG/DL (ref 6–23)
CALCIUM SERPL-MCNC: 9.4 MG/DL (ref 8.6–10.3)
CARDIAC TROPONIN I PNL SERPL HS: 7 NG/L (ref 0–13)
CHLORIDE SERPL-SCNC: 101 MMOL/L (ref 98–107)
CO2 SERPL-SCNC: 28 MMOL/L (ref 21–32)
CREAT SERPL-MCNC: 0.76 MG/DL (ref 0.5–1.05)
EGFRCR SERPLBLD CKD-EPI 2021: 85 ML/MIN/1.73M*2
EOSINOPHIL # BLD AUTO: 0.12 X10*3/UL (ref 0–0.7)
EOSINOPHIL NFR BLD AUTO: 2.9 %
ERYTHROCYTE [DISTWIDTH] IN BLOOD BY AUTOMATED COUNT: 13.7 % (ref 11.5–14.5)
GLUCOSE SERPL-MCNC: 85 MG/DL (ref 74–99)
HCT VFR BLD AUTO: 39.2 % (ref 36–46)
HGB BLD-MCNC: 13.2 G/DL (ref 12–16)
IMM GRANULOCYTES # BLD AUTO: 0.01 X10*3/UL (ref 0–0.7)
IMM GRANULOCYTES NFR BLD AUTO: 0.2 % (ref 0–0.9)
LIPASE SERPL-CCNC: 31 U/L (ref 9–82)
LYMPHOCYTES # BLD AUTO: 1.18 X10*3/UL (ref 1.2–4.8)
LYMPHOCYTES NFR BLD AUTO: 28.3 %
MCH RBC QN AUTO: 31.3 PG (ref 26–34)
MCHC RBC AUTO-ENTMCNC: 33.7 G/DL (ref 32–36)
MCV RBC AUTO: 93 FL (ref 80–100)
MONOCYTES # BLD AUTO: 0.41 X10*3/UL (ref 0.1–1)
MONOCYTES NFR BLD AUTO: 9.8 %
NEUTROPHILS # BLD AUTO: 2.42 X10*3/UL (ref 1.2–7.7)
NEUTROPHILS NFR BLD AUTO: 58.1 %
NRBC BLD-RTO: 0 /100 WBCS (ref 0–0)
PLATELET # BLD AUTO: 212 X10*3/UL (ref 150–450)
POTASSIUM SERPL-SCNC: 3.7 MMOL/L (ref 3.5–5.3)
PROT SERPL-MCNC: 7.2 G/DL (ref 6.4–8.2)
RBC # BLD AUTO: 4.22 X10*6/UL (ref 4–5.2)
SODIUM SERPL-SCNC: 134 MMOL/L (ref 136–145)
WBC # BLD AUTO: 4.2 X10*3/UL (ref 4.4–11.3)

## 2024-04-06 PROCEDURE — 2500000001 HC RX 250 WO HCPCS SELF ADMINISTERED DRUGS (ALT 637 FOR MEDICARE OP)

## 2024-04-06 PROCEDURE — 71045 X-RAY EXAM CHEST 1 VIEW: CPT | Mod: FOREIGN READ | Performed by: RADIOLOGY

## 2024-04-06 PROCEDURE — 83690 ASSAY OF LIPASE: CPT | Performed by: EMERGENCY MEDICINE

## 2024-04-06 PROCEDURE — 85025 COMPLETE CBC W/AUTO DIFF WBC: CPT

## 2024-04-06 PROCEDURE — 84484 ASSAY OF TROPONIN QUANT: CPT | Performed by: EMERGENCY MEDICINE

## 2024-04-06 PROCEDURE — 71045 X-RAY EXAM CHEST 1 VIEW: CPT

## 2024-04-06 PROCEDURE — 99284 EMERGENCY DEPT VISIT MOD MDM: CPT | Performed by: EMERGENCY MEDICINE

## 2024-04-06 PROCEDURE — 99283 EMERGENCY DEPT VISIT LOW MDM: CPT | Mod: 25

## 2024-04-06 PROCEDURE — 80053 COMPREHEN METABOLIC PANEL: CPT

## 2024-04-06 PROCEDURE — 36415 COLL VENOUS BLD VENIPUNCTURE: CPT

## 2024-04-06 RX ORDER — NYSTATIN 100000 [USP'U]/ML
5 SUSPENSION ORAL 4 TIMES DAILY
Qty: 300 ML | Refills: 0 | Status: SHIPPED | OUTPATIENT
Start: 2024-04-06 | End: 2024-04-16

## 2024-04-06 RX ORDER — NYSTATIN 100000 [USP'U]/ML
5 SUSPENSION ORAL ONCE
Status: COMPLETED | OUTPATIENT
Start: 2024-04-06 | End: 2024-04-06

## 2024-04-06 RX ORDER — ALUMINUM HYDROXIDE, MAGNESIUM HYDROXIDE, AND SIMETHICONE 1200; 120; 1200 MG/30ML; MG/30ML; MG/30ML
30 SUSPENSION ORAL ONCE
Status: COMPLETED | OUTPATIENT
Start: 2024-04-06 | End: 2024-04-06

## 2024-04-06 RX ORDER — OMEPRAZOLE 20 MG/1
20 CAPSULE, DELAYED RELEASE ORAL DAILY
Qty: 14 CAPSULE | Refills: 0 | Status: SHIPPED | OUTPATIENT
Start: 2024-04-06 | End: 2024-06-11

## 2024-04-06 RX ORDER — CLOTRIMAZOLE 10 MG/1
10 LOZENGE ORAL; TOPICAL 3 TIMES DAILY
Qty: 90 TROCHE | Refills: 0 | Status: SHIPPED | OUTPATIENT
Start: 2024-04-06 | End: 2024-05-06

## 2024-04-06 RX ORDER — LIDOCAINE HYDROCHLORIDE 20 MG/ML
15 SOLUTION OROPHARYNGEAL ONCE
Status: COMPLETED | OUTPATIENT
Start: 2024-04-06 | End: 2024-04-06

## 2024-04-06 RX ADMIN — LIDOCAINE HYDROCHLORIDE 15 ML: 20 SOLUTION ORAL; TOPICAL at 14:29

## 2024-04-06 RX ADMIN — NYSTATIN 500000 UNITS: 100000 SUSPENSION ORAL at 14:29

## 2024-04-06 RX ADMIN — ALUMINUM HYDROXIDE, MAGNESIUM HYDROXIDE, AND DIMETHICONE 30 ML: 200; 20; 200 SUSPENSION ORAL at 14:29

## 2024-04-06 ASSESSMENT — COLUMBIA-SUICIDE SEVERITY RATING SCALE - C-SSRS
2. HAVE YOU ACTUALLY HAD ANY THOUGHTS OF KILLING YOURSELF?: NO
6. HAVE YOU EVER DONE ANYTHING, STARTED TO DO ANYTHING, OR PREPARED TO DO ANYTHING TO END YOUR LIFE?: NO
1. IN THE PAST MONTH, HAVE YOU WISHED YOU WERE DEAD OR WISHED YOU COULD GO TO SLEEP AND NOT WAKE UP?: NO

## 2024-04-06 ASSESSMENT — LIFESTYLE VARIABLES
EVER HAD A DRINK FIRST THING IN THE MORNING TO STEADY YOUR NERVES TO GET RID OF A HANGOVER: NO
HAVE PEOPLE ANNOYED YOU BY CRITICIZING YOUR DRINKING: NO
HAVE YOU EVER FELT YOU SHOULD CUT DOWN ON YOUR DRINKING: NO
TOTAL SCORE: 0
EVER FELT BAD OR GUILTY ABOUT YOUR DRINKING: NO

## 2024-04-06 ASSESSMENT — PAIN DESCRIPTION - PAIN TYPE: TYPE: ACUTE PAIN

## 2024-04-06 ASSESSMENT — PAIN DESCRIPTION - LOCATION: LOCATION: THROAT

## 2024-04-06 ASSESSMENT — PAIN SCALES - GENERAL
PAINLEVEL_OUTOF10: 5 - MODERATE PAIN
PAINLEVEL_OUTOF10: 6

## 2024-04-06 ASSESSMENT — PAIN - FUNCTIONAL ASSESSMENT
PAIN_FUNCTIONAL_ASSESSMENT: 0-10
PAIN_FUNCTIONAL_ASSESSMENT: 0-10

## 2024-04-06 NOTE — ED PROVIDER NOTES
HPI   Chief Complaint   Patient presents with    Sore Throat     On antibiotics for infection and pt is concerned for spread to throat.       Patient is a 68-year-old female with hyperlipidemia, scleroderma, Raynaud's, restless leg syndrome, lupus, migraines, Parkinson's disease, hiatal hernia s/p Nissen fundoplication presenting to the emergency department for sore throat.  Patient does have an immunodeficiency and receives IgG infusions every month.  Her most recent infusion was 2 weeks ago.  She was having a cough and they had obtained a sputum culture which was positive for Pseudomonas aeruginosa.  She was placed on a course of Augmentin, ciprofloxacin, and nebulized tobramycin.  She has finished the Augmentin.  She has not yet filled her ciprofloxacin.  She has been using tobramycin for the last week.  Her sore throat has been ongoing for approximately 1 week.  She also reports that she has been having dysphagia and pain when she swallows.  The pain is mostly in her esophagus that occurs immediately after she eats or drinks and can last up to an hour.  She has history of esophageal stenosis/strictures.  She has seen a GI physician in the past and they have performed dilatations of her esophagus as well as Botox injections.  This was approxi-1 year ago.  She has an appointment with her GI doctor and 3 days.  She has also noticed that she has been having white spots on her tongue and the back of her throat.  She has been scraping the white spots off her tongue.  She had temperature of 101.1 °F at home, but is afebrile here.  Has not had any fevers other than that.  Denies any nausea, abdominal pain, dysuria, diarrhea, black or bloody stools, shortness of breath.      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 (CMS/[facility])     Anti-pneumococcal polysaccharide antibody deficiency    COVID-19 09/21/2022    COVID-19 virus infection    COVID-19 virus infection 06/21/2023    Dysphagia, unspecified     Dysphagia, idiopathic    Encounter for screening for malignant neoplasm of cervix     Pap smear for cervical cancer screening    Localized edema 05/04/2022    Bilateral edema of lower extremity    Parkinson's disease (CMS/HCC) 06/21/2022    Parkinsonism    Personal history of other diseases of the digestive system 07/18/2018    History of hiatal hernia    Personal history of other diseases of the musculoskeletal system and connective tissue     History of osteoarthritis    Personal history of other diseases of the respiratory system 10/08/2017    History of asthma    Personal history of other endocrine, nutritional and metabolic disease 08/08/2016    History of hyperlipidemia    Personal history of other endocrine, nutritional and metabolic disease 10/08/2017    History of hypothyroidism    Personal history of other malignant neoplasm of skin     History of malignant neoplasm of skin    Personal history of other medical treatment     History of screening mammography    Personal history of pneumonia (recurrent) 03/18/2020    History of pneumonia due to Pseudomonas    Personal history of pneumonia (recurrent) 01/29/2021    History of pneumonia due to Pseudomonas    Pulmonary mycobacterial infection (CMS/[facility])     Mycobacterium avium-intracellulare complex    Raynaud's syndrome without gangrene     Raynaud disease     Past Surgical History:   Procedure Laterality Date    BREAST LUMPECTOMY  06/23/2017    Right Breast Lumpectomy    COLONOSCOPY  07/29/2015    Complete Colonoscopy    GASTRIC FUNDOPLICATION  07/19/2018    Esophagogastric Fundoplasty Nissen Fundoplication    OTHER SURGICAL HISTORY  02/19/2015    Treatment Of Jaw    OTHER SURGICAL HISTORY  06/06/2019    Nissen fundoplication laparoscopic    OTHER  SURGICAL HISTORY  06/23/2017    Biopsy Pleural    OTHER SURGICAL HISTORY  06/23/2017    Shoulder Repair    SINUS SURGERY  06/23/2017    Sinus Surgery    TONSILLECTOMY  06/23/2017    Tonsillectomy     Family History   Problem Relation Name Age of Onset    Other (cerebrovascular accident) Mother      COPD Father      Prostate cancer Brother       Social History     Tobacco Use    Smoking status: Never     Passive exposure: Never    Smokeless tobacco: Never   Substance Use Topics    Alcohol use: Not Currently    Drug use: Never       Physical Exam   ED Triage Vitals [04/06/24 1229]   Temperature Heart Rate Respirations BP   36.1 °C (97 °F) 77 18 142/66      Pulse Ox Temp src Heart Rate Source Patient Position   98 % -- -- --      BP Location FiO2 (%)     -- --       Physical Exam  Vitals and nursing note reviewed.   Constitutional:       General: She is not in acute distress.     Appearance: She is well-developed.   HENT:      Head: Normocephalic and atraumatic.      Right Ear: External ear normal.      Left Ear: External ear normal.      Nose: Nose normal.      Mouth/Throat:      Mouth: Mucous membranes are moist.      Pharynx: No posterior oropharyngeal erythema.      Comments: Small white plaque noted on the posterior pharynx.  Smaller white dots noted on the posterior tongue.  No tonsillar swelling, no mass of the posterior pharynx.  Airway is patent.  Eyes:      General: No scleral icterus.     Extraocular Movements: Extraocular movements intact.      Conjunctiva/sclera: Conjunctivae normal.      Pupils: Pupils are equal, round, and reactive to light.   Cardiovascular:      Rate and Rhythm: Normal rate and regular rhythm.      Heart sounds: No murmur heard.  Pulmonary:      Effort: Pulmonary effort is normal. No respiratory distress.      Breath sounds: Normal breath sounds.   Abdominal:      Palpations: Abdomen is soft.      Tenderness: There is no abdominal tenderness.   Musculoskeletal:         General: No  swelling.      Cervical back: Neck supple.   Skin:     General: Skin is warm and dry.   Neurological:      General: No focal deficit present.      Mental Status: She is alert.   Psychiatric:         Mood and Affect: Mood normal.         ED Course & MDM   Diagnoses as of 04/06/24 1533   Thrush of mouth and esophagus (CMS/[facility])       Medical Decision Making  Patient is a 68-year-old female presents emergency department for dysphagia and chest pain after swallowing.  She is afebrile hemodynamically stable on arrival.  Examination of her mouth and throat reveals white patches.  Given that the symptoms began soon after she started her antibiotics, particular her nebulized tobramycin, we suspect that this is thrush with associated esophagitis.  Given that she does have an immunodeficiency and reported fever at home, will obtain lab work to evaluate for any neutropenia or significant leukocytosis.  Chest x-ray is also obtained.  Patient is given GI cocktail as well as a nystatin swish and swallow.    CBC shows a mild leukopenia 4.2, but in no neutropenia.  No anemia or thrombocytopenia.  CMP unremarkable.  Lipase within normal limits.  Troponin negative.  X-ray of the chest shows a left lower lobe infiltrate.    Findings were discussed with the patient.  She does have a known pneumonia, for which she is on antibiotics for.  She is not short of breath, hypoxic, febrile here, hemodynamically unstable, neutropenic, or have a leukocytosis.  Patient does have improvement of her symptoms after GI cocktail and nystatin swish and swallow.  She is able to tolerate oral intake here.  She is instructed to  her prescription for ciprofloxacin and continue her tobramycin nebulizer.  She is given a prescription for nystatin swish and swallow, BMX, and clotrimazole lozenges.  She is instructed follow-up with her immunologist regarding her thrush and medications prescribed today.  She is instructed to follow-up with her GI physician  as scheduled.  Home care and return instructions discussed. Patient expressed understanding and agreement. Patient discharged in stable condition.    Dalton Black DO, PGY-3  Emergency Medicine Resident    Amount and/or Complexity of Data Reviewed  Labs: ordered.  Radiology: ordered.        Procedure  Procedures     Dalton Black DO  Resident  04/06/24 [id number]

## 2024-04-06 NOTE — ED NOTES
Pt medicated per orders. Pt with difficulty swallowing states has hx of esophageal stricture.     Gabby Manzanares, RN  04/06/24 4651

## 2024-04-08 LAB
ACID FAST STN SPEC: ABNORMAL
MYCOBACTERIUM SPEC CULT: ABNORMAL

## 2024-04-08 NOTE — H&P (VIEW-ONLY)
Subjective     History of Present Illness:   Oneyda Parmar is a 68 y.o. female who presents to GI clinic for follow-up.  She has history of Nissen fundoplication and has had problems with recurrent dysphagia after the Nissen.  She has had multiple EGDs with dilation and Botox injection.  Last time I did an EGD with dilation and Botox was in June 2023.    She comes back to me because of worsening dysphagia but mostly odynophagia.  She feels that she gets significant pain after she eats and the food just sits there for a while and she said she is bending over and with pain.  This is new and different since the last time she had the dysphagia  She was seen in the emergency room and diagnosed with pneumonia.  She is on IVIG and she was recently diagnosed with Pseudomonas and currently is on high-dose antibiotics and inhalers for that.  She tells me that she has developed oral thrush.        Review of Systems  Review of Systems   Constitutional: Negative.    Respiratory: Negative.     Cardiovascular: Negative.    Musculoskeletal: Negative.    Skin: Negative.        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 (CMS/[facility]), COVID-19 (09/21/2022), COVID-19 virus infection (06/21/2023), Dysphagia, unspecified, Encounter for screening for malignant neoplasm of cervix, Localized edema (05/04/2022), Parkinson's disease (CMS/[facility]) (06/21/2022), Personal history of other diseases of the digestive system (07/18/2018), Personal history of other diseases of the musculoskeletal system and connective tissue, Personal history of other diseases of the respiratory system (10/08/2017), Personal history of other endocrine, nutritional and metabolic disease (08/08/2016), Personal history of other endocrine, nutritional and metabolic disease (10/08/2017), Personal history of other malignant neoplasm of skin, Personal history of other medical  treatment, Personal history of pneumonia (recurrent) (03/18/2020), Personal history of pneumonia (recurrent) (01/29/2021), Pulmonary mycobacterial infection (CMS/HCC), and Raynaud's syndrome without gangrene.     Social History   reports that she has never smoked. She has never been exposed to tobacco smoke. She has never used smokeless tobacco. She reports that she does not currently use alcohol. She reports that she does not use drugs.     Family History  family history includes COPD in her father; Prostate cancer in her brother; cerebrovascular accident in her mother.     Allergies  Allergies   Allergen Reactions    Doxycycline Other and Unknown    Prednisone Unknown     Red and hot when taking prednisone    Topiramate Other, Nausea Only, Palpitations and Unknown       Medications  Current Outpatient Medications   Medication Instructions    amLODIPine (NORVASC) 2.5 mg, oral, Daily    BMX ORAL SUSPENSION (1:1:1) 10 mL, Swish & Swallow, Every 8 hours PRN    clotrimazole (MYCELEX) 10 mg, oral, 3 times daily    ibuprofen 600 mg, oral, Every 8 hours PRN    immune globulin, human, (Gammagard Liquid) infusion Infuse monthly as directed    linaCLOtide (LINZESS) 290 mcg, oral, Daily    NIFEdipine ER (ADALAT CC) 30 mg, oral, Daily    nystatin (MYCOSTATIN) 500,000 Units, oral, 4 times daily    omeprazole (PRILOSEC) 20 mg, oral, Daily, Do not crush or chew.    pramipexole (MIRAPEX) 0.5 mg, oral, 3 times daily    pramipexole (MIRAPEX) 1 mg, oral, Nightly    propranolol LA (INDERAL LA) 80 mg, oral, Daily    rizatriptan (Maxalt) 10 mg tablet One po at onset of migraine; can repeat in 2 hrs;  max 20mg daily    semaglutide (OZEMPIC) 1 mg, subcutaneous, Once Weekly    sulfaSALAzine (AZULFIDINE) 1,000 mg, oral, Daily, 2 tabs once a day    tobramycin (Ashkan) 300 mg/5 mL nebulizer solution 300 mg, nebulization, 2 times daily RT       Objective   There were no vitals filed for this visit.  Physical Exam  Vitals reviewed.    Constitutional:       Appearance: Normal appearance.   Cardiovascular:      Rate and Rhythm: Normal rate and regular rhythm.      Pulses: Normal pulses.   Pulmonary:      Effort: Pulmonary effort is normal.      Breath sounds: Normal breath sounds.   Abdominal:      General: Abdomen is flat. Bowel sounds are normal. There is no distension.      Palpations: Abdomen is soft.      Tenderness: There is no abdominal tenderness.   Musculoskeletal:         General: Normal range of motion.   Neurological:      Mental Status: She is alert.                 Assessment/Plan   Oneyda Parmar is a 68 y.o. female who presents to GI clinic for evaluation of a dysphagia and odynophagia.  I think she has esophageal candidiasis rather than problems from the Nissen or the EGJ outflow obstruction.    She is already on my schedule for an endoscopy next week at the hospital.  I will do biopsies of the esophagus to evaluate for candidiasis.  Will treat her afterwards.  I will hold off doing Botox at this point.          Bobby Rosario MD

## 2024-04-08 NOTE — PROGRESS NOTES
Subjective     History of Present Illness:   Oneyda Parmar is a 68 y.o. female who presents to GI clinic for follow-up.  She has history of Nissen fundoplication and has had problems with recurrent dysphagia after the Nissen.  She has had multiple EGDs with dilation and Botox injection.  Last time I did an EGD with dilation and Botox was in June 2023.    She comes back to me because of worsening dysphagia but mostly odynophagia.  She feels that she gets significant pain after she eats and the food just sits there for a while and she said she is bending over and with pain.  This is new and different since the last time she had the dysphagia  She was seen in the emergency room and diagnosed with pneumonia.  She is on IVIG and she was recently diagnosed with Pseudomonas and currently is on high-dose antibiotics and inhalers for that.  She tells me that she has developed oral thrush.        Review of Systems  Review of Systems   Constitutional: Negative.    Respiratory: Negative.     Cardiovascular: Negative.    Musculoskeletal: Negative.    Skin: Negative.        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 (CMS/[facility]), COVID-19 (09/21/2022), COVID-19 virus infection (06/21/2023), Dysphagia, unspecified, Encounter for screening for malignant neoplasm of cervix, Localized edema (05/04/2022), Parkinson's disease (CMS/[facility]) (06/21/2022), Personal history of other diseases of the digestive system (07/18/2018), Personal history of other diseases of the musculoskeletal system and connective tissue, Personal history of other diseases of the respiratory system (10/08/2017), Personal history of other endocrine, nutritional and metabolic disease (08/08/2016), Personal history of other endocrine, nutritional and metabolic disease (10/08/2017), Personal history of other malignant neoplasm of skin, Personal history of other medical  treatment, Personal history of pneumonia (recurrent) (03/18/2020), Personal history of pneumonia (recurrent) (01/29/2021), Pulmonary mycobacterial infection (CMS/HCC), and Raynaud's syndrome without gangrene.     Social History   reports that she has never smoked. She has never been exposed to tobacco smoke. She has never used smokeless tobacco. She reports that she does not currently use alcohol. She reports that she does not use drugs.     Family History  family history includes COPD in her father; Prostate cancer in her brother; cerebrovascular accident in her mother.     Allergies  Allergies   Allergen Reactions    Doxycycline Other and Unknown    Prednisone Unknown     Red and hot when taking prednisone    Topiramate Other, Nausea Only, Palpitations and Unknown       Medications  Current Outpatient Medications   Medication Instructions    amLODIPine (NORVASC) 2.5 mg, oral, Daily    BMX ORAL SUSPENSION (1:1:1) 10 mL, Swish & Swallow, Every 8 hours PRN    clotrimazole (MYCELEX) 10 mg, oral, 3 times daily    ibuprofen 600 mg, oral, Every 8 hours PRN    immune globulin, human, (Gammagard Liquid) infusion Infuse monthly as directed    linaCLOtide (LINZESS) 290 mcg, oral, Daily    NIFEdipine ER (ADALAT CC) 30 mg, oral, Daily    nystatin (MYCOSTATIN) 500,000 Units, oral, 4 times daily    omeprazole (PRILOSEC) 20 mg, oral, Daily, Do not crush or chew.    pramipexole (MIRAPEX) 0.5 mg, oral, 3 times daily    pramipexole (MIRAPEX) 1 mg, oral, Nightly    propranolol LA (INDERAL LA) 80 mg, oral, Daily    rizatriptan (Maxalt) 10 mg tablet One po at onset of migraine; can repeat in 2 hrs;  max 20mg daily    semaglutide (OZEMPIC) 1 mg, subcutaneous, Once Weekly    sulfaSALAzine (AZULFIDINE) 1,000 mg, oral, Daily, 2 tabs once a day    tobramycin (Ashkan) 300 mg/5 mL nebulizer solution 300 mg, nebulization, 2 times daily RT       Objective   There were no vitals filed for this visit.  Physical Exam  Vitals reviewed.    Constitutional:       Appearance: Normal appearance.   Cardiovascular:      Rate and Rhythm: Normal rate and regular rhythm.      Pulses: Normal pulses.   Pulmonary:      Effort: Pulmonary effort is normal.      Breath sounds: Normal breath sounds.   Abdominal:      General: Abdomen is flat. Bowel sounds are normal. There is no distension.      Palpations: Abdomen is soft.      Tenderness: There is no abdominal tenderness.   Musculoskeletal:         General: Normal range of motion.   Neurological:      Mental Status: She is alert.                 Assessment/Plan   Oneyda Parmar is a 68 y.o. female who presents to GI clinic for evaluation of a dysphagia and odynophagia.  I think she has esophageal candidiasis rather than problems from the Nissen or the EGJ outflow obstruction.    She is already on my schedule for an endoscopy next week at the hospital.  I will do biopsies of the esophagus to evaluate for candidiasis.  Will treat her afterwards.  I will hold off doing Botox at this point.          Bobby Rosario MD

## 2024-04-09 ENCOUNTER — OFFICE VISIT (OUTPATIENT)
Dept: GASTROENTEROLOGY | Facility: CLINIC | Age: 69
End: 2024-04-09
Payer: MEDICARE

## 2024-04-09 VITALS
DIASTOLIC BLOOD PRESSURE: 64 MMHG | SYSTOLIC BLOOD PRESSURE: 108 MMHG | WEIGHT: 148 LBS | BODY MASS INDEX: 23.89 KG/M2 | HEART RATE: 73 BPM

## 2024-04-09 DIAGNOSIS — R13.19 ESOPHAGEAL DYSPHAGIA: Primary | ICD-10-CM

## 2024-04-09 DIAGNOSIS — R13.10 ODYNOPHAGIA: ICD-10-CM

## 2024-04-09 PROCEDURE — 1159F MED LIST DOCD IN RCRD: CPT | Performed by: INTERNAL MEDICINE

## 2024-04-09 PROCEDURE — 1157F ADVNC CARE PLAN IN RCRD: CPT | Performed by: INTERNAL MEDICINE

## 2024-04-09 PROCEDURE — 99214 OFFICE O/P EST MOD 30 MIN: CPT | Performed by: INTERNAL MEDICINE

## 2024-04-09 PROCEDURE — 1160F RVW MEDS BY RX/DR IN RCRD: CPT | Performed by: INTERNAL MEDICINE

## 2024-04-09 ASSESSMENT — ENCOUNTER SYMPTOMS
CARDIOVASCULAR NEGATIVE: 1
RESPIRATORY NEGATIVE: 1
CONSTITUTIONAL NEGATIVE: 1
MUSCULOSKELETAL NEGATIVE: 1

## 2024-04-10 ENCOUNTER — APPOINTMENT (OUTPATIENT)
Dept: GASTROENTEROLOGY | Facility: EXTERNAL LOCATION | Age: 69
End: 2024-04-10
Payer: MEDICARE

## 2024-04-10 ENCOUNTER — ANESTHESIA (OUTPATIENT)
Dept: GASTROENTEROLOGY | Facility: HOSPITAL | Age: 69
End: 2024-04-10
Payer: MEDICARE

## 2024-04-15 ENCOUNTER — HOSPITAL ENCOUNTER (OUTPATIENT)
Dept: GASTROENTEROLOGY | Facility: HOSPITAL | Age: 69
Discharge: HOME | End: 2024-04-15
Payer: MEDICARE

## 2024-04-15 VITALS
WEIGHT: 151.01 LBS | SYSTOLIC BLOOD PRESSURE: 116 MMHG | HEART RATE: 70 BPM | OXYGEN SATURATION: 99 % | DIASTOLIC BLOOD PRESSURE: 57 MMHG | RESPIRATION RATE: 18 BRPM | BODY MASS INDEX: 24.37 KG/M2 | TEMPERATURE: 97 F

## 2024-04-15 DIAGNOSIS — R13.10 DYSPHAGIA, UNSPECIFIED TYPE: ICD-10-CM

## 2024-04-15 PROCEDURE — 88312 SPECIAL STAINS GROUP 1: CPT | Performed by: PATHOLOGY

## 2024-04-15 PROCEDURE — 2500000002 HC RX 250 W HCPCS SELF ADMINISTERED DRUGS (ALT 637 FOR MEDICARE OP, ALT 636 FOR OP/ED): Mod: MUE | Performed by: ANESTHESIOLOGY

## 2024-04-15 PROCEDURE — 43239 EGD BIOPSY SINGLE/MULTIPLE: CPT | Performed by: INTERNAL MEDICINE

## 2024-04-15 PROCEDURE — 7100000010 HC PHASE TWO TIME - EACH INCREMENTAL 1 MINUTE

## 2024-04-15 PROCEDURE — 88305 TISSUE EXAM BY PATHOLOGIST: CPT | Performed by: PATHOLOGY

## 2024-04-15 PROCEDURE — 2500000005 HC RX 250 GENERAL PHARMACY W/O HCPCS

## 2024-04-15 PROCEDURE — 3700000002 HC GENERAL ANESTHESIA TIME - EACH INCREMENTAL 1 MINUTE

## 2024-04-15 PROCEDURE — 2500000004 HC RX 250 GENERAL PHARMACY W/ HCPCS (ALT 636 FOR OP/ED): Performed by: INTERNAL MEDICINE

## 2024-04-15 PROCEDURE — A43239 PR EDG TRANSORAL BIOPSY SINGLE/MULTIPLE

## 2024-04-15 PROCEDURE — A43239 PR EDG TRANSORAL BIOPSY SINGLE/MULTIPLE: Performed by: ANESTHESIOLOGY

## 2024-04-15 PROCEDURE — 2500000002 HC RX 250 W HCPCS SELF ADMINISTERED DRUGS (ALT 637 FOR MEDICARE OP, ALT 636 FOR OP/ED): Performed by: ANESTHESIOLOGY

## 2024-04-15 PROCEDURE — 3700000001 HC GENERAL ANESTHESIA TIME - INITIAL BASE CHARGE

## 2024-04-15 PROCEDURE — 2500000004 HC RX 250 GENERAL PHARMACY W/ HCPCS (ALT 636 FOR OP/ED)

## 2024-04-15 PROCEDURE — 88312 SPECIAL STAINS GROUP 1: CPT | Mod: TC,PARLAB | Performed by: INTERNAL MEDICINE

## 2024-04-15 PROCEDURE — 7100000009 HC PHASE TWO TIME - INITIAL BASE CHARGE

## 2024-04-15 RX ORDER — IPRATROPIUM BROMIDE AND ALBUTEROL SULFATE 2.5; .5 MG/3ML; MG/3ML
3 SOLUTION RESPIRATORY (INHALATION)
Status: DISCONTINUED | OUTPATIENT
Start: 2024-04-15 | End: 2024-04-15

## 2024-04-15 RX ORDER — PROPOFOL 10 MG/ML
INJECTION, EMULSION INTRAVENOUS AS NEEDED
Status: DISCONTINUED | OUTPATIENT
Start: 2024-04-15 | End: 2024-04-15

## 2024-04-15 RX ORDER — SODIUM CHLORIDE, SODIUM LACTATE, POTASSIUM CHLORIDE, CALCIUM CHLORIDE 600; 310; 30; 20 MG/100ML; MG/100ML; MG/100ML; MG/100ML
20 INJECTION, SOLUTION INTRAVENOUS CONTINUOUS
Status: DISCONTINUED | OUTPATIENT
Start: 2024-04-15 | End: 2024-04-16 | Stop reason: HOSPADM

## 2024-04-15 RX ORDER — LIDOCAINE HCL/PF 100 MG/5ML
SYRINGE (ML) INTRAVENOUS AS NEEDED
Status: DISCONTINUED | OUTPATIENT
Start: 2024-04-15 | End: 2024-04-15

## 2024-04-15 RX ORDER — IPRATROPIUM BROMIDE AND ALBUTEROL SULFATE 2.5; .5 MG/3ML; MG/3ML
3 SOLUTION RESPIRATORY (INHALATION)
Status: DISCONTINUED | OUTPATIENT
Start: 2024-04-15 | End: 2024-04-16 | Stop reason: HOSPADM

## 2024-04-15 RX ADMIN — IPRATROPIUM BROMIDE AND ALBUTEROL SULFATE 3 ML: 2.5; .5 SOLUTION RESPIRATORY (INHALATION) at 15:42

## 2024-04-15 RX ADMIN — PROPOFOL 60000 MCG: 10 INJECTION, EMULSION INTRAVENOUS at 14:55

## 2024-04-15 RX ADMIN — PROPOFOL 20000 MCG: 10 INJECTION, EMULSION INTRAVENOUS at 14:58

## 2024-04-15 RX ADMIN — PROPOFOL 20000 MCG: 10 INJECTION, EMULSION INTRAVENOUS at 14:57

## 2024-04-15 RX ADMIN — SODIUM CHLORIDE, POTASSIUM CHLORIDE, SODIUM LACTATE AND CALCIUM CHLORIDE 20 ML/HR: 600; 310; 30; 20 INJECTION, SOLUTION INTRAVENOUS at 13:55

## 2024-04-15 RX ADMIN — LIDOCAINE HYDROCHLORIDE 40 MG: 20 INJECTION, SOLUTION INTRAVENOUS at 14:55

## 2024-04-15 RX ADMIN — PROPOFOL 120 MCG/KG/MIN: 10 INJECTION, EMULSION INTRAVENOUS at 14:56

## 2024-04-15 SDOH — HEALTH STABILITY: MENTAL HEALTH: CURRENT SMOKER: 0

## 2024-04-15 ASSESSMENT — PAIN - FUNCTIONAL ASSESSMENT
PAIN_FUNCTIONAL_ASSESSMENT: 0-10

## 2024-04-15 ASSESSMENT — PAIN SCALES - GENERAL
PAINLEVEL_OUTOF10: 0 - NO PAIN
PAINLEVEL_OUTOF10: 0 - NO PAIN
PAIN_LEVEL: 0
PAINLEVEL_OUTOF10: 0 - NO PAIN

## 2024-04-15 ASSESSMENT — COLUMBIA-SUICIDE SEVERITY RATING SCALE - C-SSRS
6. HAVE YOU EVER DONE ANYTHING, STARTED TO DO ANYTHING, OR PREPARED TO DO ANYTHING TO END YOUR LIFE?: NO
2. HAVE YOU ACTUALLY HAD ANY THOUGHTS OF KILLING YOURSELF?: NO
1. IN THE PAST MONTH, HAVE YOU WISHED YOU WERE DEAD OR WISHED YOU COULD GO TO SLEEP AND NOT WAKE UP?: NO

## 2024-04-15 NOTE — PERIOPERATIVE NURSING NOTE
MD at bedside, spoke with patient.  Dr. Rosario aware that patient will be receiving a Duoneb Aerosol  treatment for constant productive cough post procedure.    Kira Blount RN

## 2024-04-15 NOTE — ANESTHESIA PREPROCEDURE EVALUATION
Patient: Oneyda Parmar    Procedure Information       Date/Time: 04/15/24 1430    Scheduled providers: Bobby Rosario MD; Tang Bates MD    Procedure: EGD    Location: [facility]            Relevant Problems   Anesthesia (within normal limits)      Cardiac   (+) Borderline hyperlipidemia      Pulmonary   (+) Pulmonary nodules      Neuro   (+) Major depressive disorder, recurrent, mild (CMS-HCC)   (+) Major depressive disorder, recurrent, moderate (Multi)   (+) Major depressive disorder, recurrent, unspecified (CMS-HCC)      GI   (+) Dysphagia   (+) Gastroesophageal reflux disease   (+) Hernia, hiatal      Musculoskeletal   (+) Chronic low back pain   (+) Scoliosis   (+) Systemic lupus erythematosus (Multi)      HEENT   (+) Chronic sinusitis      GYN   (+) Fibroid, uterine       Clinical information reviewed:    Allergies  Meds               NPO Detail:  NPO/Void Status  Date of Last Liquid: 04/14/24  Time of Last Liquid: 2200  Date of Last Solid: 04/14/24  Time of Last Solid: 2230         Physical Exam    Airway  Mallampati: II  TM distance: >3 FB  Neck ROM: full     Cardiovascular   Rhythm: regular  Rate: normal     Dental - normal exam     Pulmonary    Abdominal        Anesthesia Plan    History of general anesthesia?: yes  History of complications of general anesthesia?: no    ASA 3     MAC     The patient is not a current smoker.  Patient was not previously instructed to abstain from smoking on day of procedure.  Patient did not smoke on day of procedure.    intravenous induction   Anesthetic plan and risks discussed with patient.  Use of blood products discussed with patient who.    Plan discussed with CRNA.

## 2024-04-15 NOTE — ANESTHESIA POSTPROCEDURE EVALUATION
Patient: Oneyda Parmar    Procedure Summary       Date: 04/15/24 Room / Location: [facility]    Anesthesia Start: 1453 Anesthesia Stop: 1511    Procedure: EGD Diagnosis: Dysphagia, unspecified type    Scheduled Providers: Bobby Rosario MD; Tang Bates MD Responsible Provider: MAURA Valdivia    Anesthesia Type: MAC ASA Status: 3            Anesthesia Type: MAC    Vitals Value Taken Time   /74 04/15/24 1512   Temp 36.1 04/15/24 1512   Pulse 79 04/15/24 1512   Resp 16 04/15/24 1512   SpO2 97% 04/15/24 1512       Anesthesia Post Evaluation    Patient location during evaluation: PACU  Patient participation: complete - patient participated  Level of consciousness: awake and alert  Pain score: 0  Pain management: satisfactory to patient  Airway patency: patent  Cardiovascular status: acceptable and hemodynamically stable  Respiratory status: acceptable and room air  Hydration status: acceptable  Postoperative Nausea and Vomiting: none        There were no known notable events for this encounter.

## 2024-04-15 NOTE — DISCHARGE INSTRUCTIONS
Patient Instructions after an EGD      The anesthetics, sedatives or narcotics which were given to you today will be acting in your body for the next 24 hours, so you might feel a little sleepy or groggy.  This feeling should slowly wear off. Carefully read and follow the instructions.     You received sedation today:  - Do not drive or operate any machinery or power tools of any kind.   - No alcoholic beverages today, not even beer or wine.  - Do not make any important decisions or sign any legal documents.  - No over the counter medications that contain alcohol or that may cause drowsiness.  - Do not make any important decisions or sign any legal documents.    While it is common to experience mild to moderate abdominal distention, gas, or belching after your procedure, if any of these symptoms occur following discharge from the GI Lab or within one week of having your procedure, call the [facility] to be advised whether a visit to your nearest Urgent Care or Emergency Department is indicated.  Take this paper with you if you go.     - If you develop an allergic reaction to the medications that were given during your procedure such as difficulty breathing, rash, hives, severe nausea, vomiting or lightheadedness.  - If you experience chest pain, shortness of breath, severe abdominal pain, fevers and chills.  -If you develop signs and symptoms of bleeding such as blood in your spit, if your stools turn black, tarry, or bloody  - If you have not urinated within 8 hours following your procedure.  - If your IV site becomes painful, red, inflamed, or looks infected.    If you received a biopsy/polypectomy/sphincterotomy the following instructions apply below:    __ Do not use Aspirin containing products, non-steroidal medications or anti-coagulants for one week following your procedure. (Examples of these types of medications are: Advil, Arthrotec, Aleve, Coumadin, Ecotrin, Heparin, Ibuprofen, Indocin,  Motrin, Naprosyn, Nuprin, Plavix, Vioxx, and Voltarin, or their generic forms.  This list is not all-inclusive.  Check with your physician or pharmacist before resuming medications.)   __ Eat a soft diet today.  Avoid foods that are poorly digested for the next 24 hours.  These foods would include: nuts, beans, lettuce, red meats, and fried foods. Start with liquids and advance your diet as tolerated, gradually work up to eating solids.   __ Do not have a Barium Study or Enema for one week.    Your physician recommends the additional following instructions:    -You have a contact number available for emergencies. The signs and symptoms of potential delayed complications were discussed with you. You may return to normal activities tomorrow.  -Resume your previous diet.  -Continue your present medications.   -We are waiting for your pathology results.  -Your physician has recommended a repeat colonoscopy (date to be determined after pending pathology results are reviewed) for surveillance based on pathology results.  -The findings and recommendations have been discussed with you.  -The findings and recommendations were discussed with your family.  - Please see Medication Reconciliation Form for new medication/medications prescribed.       If you experience any problems or have any questions following discharge from the GI Lab, please call: [phone]

## 2024-04-17 LAB
ACID FAST STN SPEC: NORMAL
MYCOBACTERIUM SPEC CULT: NORMAL

## 2024-04-18 LAB
LABORATORY COMMENT REPORT: NORMAL
PATH REPORT.FINAL DX SPEC: NORMAL
PATH REPORT.GROSS SPEC: NORMAL
PATH REPORT.RELEVANT HX SPEC: NORMAL
PATH REPORT.TOTAL CANCER: NORMAL

## 2024-04-19 DIAGNOSIS — B37.81 ESOPHAGEAL CANDIDIASIS (MULTI): Primary | ICD-10-CM

## 2024-04-19 RX ORDER — FLUCONAZOLE 100 MG/1
TABLET ORAL
Qty: 16 TABLET | Refills: 0 | Status: SHIPPED | OUTPATIENT
Start: 2024-04-19 | End: 2024-05-31 | Stop reason: ALTCHOICE

## 2024-04-23 ENCOUNTER — APPOINTMENT (OUTPATIENT)
Dept: GASTROENTEROLOGY | Facility: CLINIC | Age: 69
End: 2024-04-23
Payer: MEDICARE

## 2024-04-25 ENCOUNTER — NURSE TRIAGE (OUTPATIENT)
Dept: PULMONOLOGY | Facility: CLINIC | Age: 69
End: 2024-04-25
Payer: MEDICARE

## 2024-04-25 DIAGNOSIS — R05.3 CHRONIC COUGH: Primary | ICD-10-CM

## 2024-04-25 LAB
ACID FAST STN SPEC: ABNORMAL
MYCOBACTERIUM SPEC CULT: ABNORMAL

## 2024-05-14 ENCOUNTER — OFFICE VISIT (OUTPATIENT)
Dept: PAIN MANAGEMENT | Age: 69
End: 2024-05-14
Payer: MEDICARE

## 2024-05-14 VITALS
WEIGHT: 150 LBS | HEIGHT: 65 IN | BODY MASS INDEX: 24.99 KG/M2 | SYSTOLIC BLOOD PRESSURE: 104 MMHG | DIASTOLIC BLOOD PRESSURE: 68 MMHG | TEMPERATURE: 97.4 F

## 2024-05-14 DIAGNOSIS — M46.1 SACROILIITIS (HCC): Primary | ICD-10-CM

## 2024-05-14 DIAGNOSIS — M47.816 LUMBAR SPONDYLOSIS: ICD-10-CM

## 2024-05-14 PROCEDURE — G8420 CALC BMI NORM PARAMETERS: HCPCS | Performed by: NURSE PRACTITIONER

## 2024-05-14 PROCEDURE — G8399 PT W/DXA RESULTS DOCUMENT: HCPCS | Performed by: NURSE PRACTITIONER

## 2024-05-14 PROCEDURE — 1123F ACP DISCUSS/DSCN MKR DOCD: CPT | Performed by: NURSE PRACTITIONER

## 2024-05-14 PROCEDURE — 1090F PRES/ABSN URINE INCON ASSESS: CPT | Performed by: NURSE PRACTITIONER

## 2024-05-14 PROCEDURE — 99214 OFFICE O/P EST MOD 30 MIN: CPT | Performed by: NURSE PRACTITIONER

## 2024-05-14 PROCEDURE — G8427 DOCREV CUR MEDS BY ELIG CLIN: HCPCS | Performed by: NURSE PRACTITIONER

## 2024-05-14 PROCEDURE — 1036F TOBACCO NON-USER: CPT | Performed by: NURSE PRACTITIONER

## 2024-05-14 PROCEDURE — 3017F COLORECTAL CA SCREEN DOC REV: CPT | Performed by: NURSE PRACTITIONER

## 2024-05-14 RX ORDER — OMEPRAZOLE 20 MG/1
CAPSULE, DELAYED RELEASE ORAL
COMMUNITY

## 2024-05-14 RX ORDER — TOBRAMYCIN INHALATION SOLUTION 300 MG/5ML
300 INHALANT RESPIRATORY (INHALATION)
COMMUNITY
Start: 2021-01-29 | End: 2024-05-25

## 2024-05-14 ASSESSMENT — ENCOUNTER SYMPTOMS
COUGH: 0
GASTROINTESTINAL NEGATIVE: 1
DIARRHEA: 0
BACK PAIN: 1
SHORTNESS OF BREATH: 0

## 2024-05-14 NOTE — PROGRESS NOTES
Ángel.  Patient was last seen by Dr. Forbes for bilateral L3-4-5 RF ablation on 11/15/2023 and says this really didn't work.  She is having more upper buttock pain that can be referred into buttock and hip. Sitting helps.  She has history of Parkinson's disease.  Follows with neurology.  MRI from 2023 of lumbar spine shows multilevel DDD worse at L3-4 and L5-S1 narrowing and spondylosis with some neural foraminal narrowing.  She tries to exercise regularly. She finds she has difficulty standing up straight at times d/t pain.     Pt feels pain level 6/10.  Pt feels that bending, standing straight makes the pain worse, and sitting makes the pain better.  Pt denies radiating numbness and tingling.  Denies recent falls, injuries or trauma.  Pt denies new weakness. Pt reports PT has been done.           Review of Systems   Constitutional: Negative.  Negative for fatigue.   HENT: Negative.  Negative for trouble swallowing.    Eyes: Negative.    Respiratory:  Negative for cough and shortness of breath.    Cardiovascular:  Negative for chest pain.   Gastrointestinal: Negative.  Negative for constipation and diarrhea.   Endocrine: Negative.    Genitourinary: Negative.    Musculoskeletal:  Positive for back pain and neck pain.   Skin: Negative.    Neurological:  Negative for dizziness, weakness and headaches.   Hematological: Negative.    Psychiatric/Behavioral: Negative.           Objective:     Vitals:  /68 (Site: Left Upper Arm)   Temp 97.4 °F (36.3 °C) (Temporal)   Ht 1.651 m (5' 5\")   Wt 68 kg (150 lb)   BMI 24.96 kg/m² Pain Score:   6      Physical Exam  Vitals and nursing note reviewed.       This is a pleasant female who answers questions appropriately and follows commands.  Pt is alert and oriented x 3.  Recent and remote memory is intact.  Mood and affect, judgement and insight are normal.  No signs of distress, no dyspnea or SOB noted. HEENT: PERRL.  Neck is supple, trachea midline.  No lymphadenopathy

## 2024-05-18 NOTE — PROGRESS NOTES
Subjective   Patient ID: [id number]   Oneyda Parmar is a 68 y.o. female with MCTD? OP, OA, vitamin D def, Raynaud's, GERD, CVID, DL, venous insufficiency, chronic sinusitis, depression, migraine, scoliosis, RLS, Parkinson's, bronchiectasis, and pulmonary nodules, who presents for MCTD management, referred by pulm Dr. Duenas    Current rheum meds:  - SSZ 1000 mg every day   -  mg daily, used to be BID     Previous rheum meds:  - None     HPI  Her previous rheumatologist Dr. Coto moved to [facility]  Diagnosed as MCTD a 5 years ago due to Raynaud's, KISHA 1:40, can't remember other sx at that time  Cold triggers it, fingers and toes turn white then purple then red, painful, lasts at least half an hour  Nifedipine doesn't help  She reports no improvement with the meds she has been on for the past 5 years  She has a current lung infection with 2 different organisms including pseudomonas, she was referred to ID, will be seeing Dr. Alex soon. She is currently on inhaled tobramycin   She also had a mycobacterial infection previously and was treated with antibiotics for 2 years   Bilateral ankle pain and swelling, left more than the right, recently started  Reports mechanical back pain  Hand pains, with trigger fingers     + dry mouth, uses cough drops  + dysphagia reports is related to her Parkinson's disease  + dyspnea and cough from her pneumonia     PSH: Breast lumpectomy, gastric fundoplication, sinus surgery, tonsillectomy, shoulder repair   Allergies: Per EMR   Habits: No tobacco, no alcohol, no drugs   Social hx: , no kids, retired,      ROS:  Constitutional: Denies fever, chills, weight loss, night sweats or headaches  Eyes: Denies dry eyes, blurry vision, redness or pain  ENT: Denies dental loss, loss of taste, nasal or oral ulcers, jaw claudication, difficulty swallowing, nasal crusting or recurrent sinus infections   Cardiovascular: Denies chest pain, palpitations,  orthopnea  Respiratory: Denies asthma, or recurrent respiratory infections  Gastrointestinal: Denies nausea, vomiting, heartburn, abdominal pain, constipation, diarrhea, melena or hematochezia  Genitourinary: No recurrent urinary infections or STDs, no genital or anal ulcers  Integumentary: Denies photosensitivity, rash or lesions, skin tightening, digital ulcers, psoriatic lesions, or alopecia  Neurological: Denies any numbness or tingling, muscle weakness, or incontinence   MSK: No inflammatory back pain, enthesitis, dactylitis. No morning stiffness   Muscular: Denies weakness, difficulty rising from chair or combing the hair, muscle aches, or problems with hand    FHx: No family history of autoimmune diseases     Patient Active Problem List   Diagnosis    Abdominal bloating    Arthritis    Atrophic vaginitis    Back pain, chronic    Chronic low back pain    Bilateral edema of lower extremity    Borderline hyperlipidemia    Bronchiectasis (Multi)    Cervical somatic dysfunction    Chronic constipation    Chronic sinusitis    Contusion of ribs, left, sequela    Chronic cough    Contusion, shoulder and upper arm, multiple sites, left, subsequent encounter    CVI (common variable immunodeficiency) (Multi)    Fibroid, uterine    Gastroesophageal reflux disease    Dysphagia    Hernia, hiatal    MCTD (mixed connective tissue disease) (Multi)    Migraine without aura and without status migrainosus, not intractable    Overactive bladder    Palpitations    Pulmonary nodules    Raynaud's syndrome    RLS (restless legs syndrome)    Scleroderma (Multi)    Scoliosis    Senile osteoporosis    Somatic dysfunction of lumbosacral region    Somatic dysfunction of pelvic region    Strain of thoracic region    Systemic lupus erythematosus (Multi)    Thoracic myofascial strain    Vasomotor instability    Venous insufficiency (chronic) (peripheral)    Vitamin D deficiency    Medicare annual wellness visit, subsequent    Parkinson's  disease (Multi)    Major depressive disorder, recurrent, mild (CMS-HCC)    Major depressive disorder, recurrent, moderate (Multi)    Major depressive disorder, recurrent, unspecified (CMS-HCC)      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)     Anti-pneumococcal polysaccharide antibody deficiency    COVID-19 09/21/2022    COVID-19 virus infection    COVID-19 virus infection 06/21/2023    Dysphagia, unspecified     Dysphagia, idiopathic    Encounter for screening for malignant neoplasm of cervix     Pap smear for cervical cancer screening    Localized edema 05/04/2022    Bilateral edema of lower extremity    Parkinson's disease (Multi) 06/21/2022    Parkinsonism    Personal history of other diseases of the digestive system 07/18/2018    History of hiatal hernia    Personal history of other diseases of the musculoskeletal system and connective tissue     History of osteoarthritis    Personal history of other diseases of the respiratory system 10/08/2017    History of asthma    Personal history of other endocrine, nutritional and metabolic disease 08/08/2016    History of hyperlipidemia    Personal history of other endocrine, nutritional and metabolic disease 10/08/2017    History of hypothyroidism    Personal history of other malignant neoplasm of skin     History of malignant neoplasm of skin    Personal history of other medical treatment     History of screening mammography    Personal history of pneumonia (recurrent) 03/18/2020    History of pneumonia due to Pseudomonas    Personal history of pneumonia (recurrent) 01/29/2021    History of pneumonia due to Pseudomonas    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    Raynaud's syndrome without gangrene     Raynaud disease     Past Surgical History:   Procedure Laterality Date    BREAST LUMPECTOMY  06/23/2017    Right Breast  Lumpectomy    COLONOSCOPY  07/29/2015    Complete Colonoscopy    GASTRIC FUNDOPLICATION  07/19/2018    Esophagogastric Fundoplasty Nissen Fundoplication    OTHER SURGICAL HISTORY  02/19/2015    Treatment Of Jaw    OTHER SURGICAL HISTORY  06/06/2019    Nissen fundoplication laparoscopic    OTHER SURGICAL HISTORY  06/23/2017    Biopsy Pleural    OTHER SURGICAL HISTORY  06/23/2017    Shoulder Repair    SINUS SURGERY  06/23/2017    Sinus Surgery    TONSILLECTOMY  06/23/2017    Tonsillectomy     Social History     Socioeconomic History    Marital status:      Spouse name: Not on file    Number of children: Not on file    Years of education: Not on file    Highest education level: Not on file   Occupational History    Not on file   Tobacco Use    Smoking status: Never     Passive exposure: Never    Smokeless tobacco: Never   Substance and Sexual Activity    Alcohol use: Not Currently    Drug use: Never    Sexual activity: Not on file   Other Topics Concern    Not on file   Social History Narrative    Not on file     Social Determinants of Health     Financial Resource Strain: Not on file   Food Insecurity: Not on file   Transportation Needs: Not on file   Physical Activity: Not on file   Stress: Not on file   Social Connections: Not on file   Intimate Partner Violence: Not on file   Housing Stability: Not on file      Allergies   Allergen Reactions    Doxycycline Other and Unknown    Prednisone Unknown     Red and hot when taking prednisone    Topiramate Other, Nausea Only, Palpitations and Unknown     Current Outpatient Medications:     amLODIPine (Norvasc) 2.5 mg tablet, Take 1 tablet (2.5 mg) by mouth once daily. (Patient not taking: Reported on 4/15/2024), Disp: 30 tablet, Rfl: 11    fluconazole (Diflucan) 100 mg tablet, Take 2 tabs (200 mg) by mouth 1 time today then 1 tab (100 mg) daily for 14 days, Disp: 16 tablet, Rfl: 0    hydroxychloroquine (Plaquenil) 200 mg tablet, Take 1 tablet (200 mg) by mouth once  daily., Disp: 90 tablet, Rfl: 1    ibuprofen 600 mg tablet, Take 1 tablet (600 mg) by mouth every 8 hours if needed for mild pain (1 - 3)., Disp: 90 tablet, Rfl: 5    immune globulin, human, (Gammagard Liquid) infusion, Infuse monthly as directed, Disp: , Rfl:     linaCLOtide (Linzess) 145 mcg capsule, Take 2 capsules (290 mcg) by mouth once daily., Disp: , Rfl:     NIFEdipine ER (NIFEdipine CC) 30 mg 24 hr tablet, Take 1 tablet (30 mg) by mouth once daily., Disp: 30 tablet, Rfl: 6    omeprazole (PriLOSEC) 20 mg DR capsule, Take 1 capsule (20 mg) by mouth once daily for 14 days. Do not crush or chew., Disp: 14 capsule, Rfl: 0    pramipexole (Mirapex) 0.5 mg tablet, Take 1 tablet (0.5 mg) by mouth 3 times a day., Disp: 270 tablet, Rfl: 3    pramipexole (Mirapex) 1 mg tablet, Take 1 tablet (1 mg) by mouth once daily at bedtime., Disp: 90 tablet, Rfl: 3    propranolol LA (Inderal LA) 80 mg 24 hr capsule, Take 1 capsule (80 mg) by mouth once daily., Disp: 30 capsule, Rfl: 6    rizatriptan (Maxalt) 10 mg tablet, One po at onset of migraine; can repeat in 2 hrs;  max 20mg daily, Disp: 9 tablet, Rfl: 6    sulfaSALAzine (Azulfidine) 500 mg tablet, Take 2 tablets (1,000 mg) by mouth once daily. 2 tabs once a day, Disp: 180 tablet, Rfl: 1     Objective   Visit Vitals  /66   Pulse 78   Temp 36.5 °C (97.7 °F)   Resp 20   Wt 68 kg (150 lb)   BMI 24.21 kg/m²   OB Status Postmenopausal   Smoking Status Never   BSA 1.78 m²     Physical Exam:  General: AAOx3, Cooperative  Head: normocephalic, atraumatic, no hair loss   Eyes: EOMI, conjunctiva clear, sclera white, anicteric  Ears: hearing intact  Nose: no deformity, no crusting   Throat/Mouth: No oral deformities, no cheek swelling, mucosa appear moist, no oral ulcers noted or loss of dentition   Neck/Lymph: FROM, trachea midline  Lungs: chest expansion symmetric, clear to auscultation bilaterally. +wheezing, no rhonchi, or stridor  Heart: S1, S2, RRR. No murmur or  rub  Abdomen: Soft, non-tender without masses  Skin: No rashes, ulcers or photosensitive areas  MSK: Upper Extremities:  Hand/Fingers: TTP of most PIPs and DIPs. Heberden's and [place]'s nodes. No erythema, edema, tenderness or warmth at DIP, PIP, or MCP joints, FROM grossly. Good hand . No nodules  Wrists: No erythema, edema, warmth or tenderness at wrist, FROM grossly  Elbows: No tenderness, edema, erythema or warmth at elbows, FROM grossly. No nodules   Shoulders: No edema, erythema, tenderness or warmth at shoulders. FROM  Lower Extremities:   Hips: No obvious deformities. No joint tenderness, normal ROM grossly. No trochanteric bursae TTP  Knees: No tenderness, deformities, edema, rashes, or warmth, normal ROM grossly. No crepitus, no pes anserine bursa TTP   Ankles: Bilateral ankle swelling, L>R, TTP around the malleoli, pitting edema with reffish discoloration.  Spine: No spinal tenderness to palpation. No SI joint tenderness    Lab Results   Component Value Date    WBC 4.2 (L) 04/06/2024    HGB 13.2 04/06/2024    HCT 39.2 04/06/2024    MCV 93 04/06/2024     04/06/2024        Chemistry    Lab Results   Component Value Date/Time     (L) 04/06/2024 1428    K 3.7 04/06/2024 1428     04/06/2024 1428    CO2 28 04/06/2024 1428    BUN 9 04/06/2024 1428    CREATININE 0.76 04/06/2024 1428    Lab Results   Component Value Date/Time    CALCIUM 9.4 04/06/2024 1428    ALKPHOS 83 04/06/2024 1428    AST 25 04/06/2024 1428    ALT 16 04/06/2024 1428    BILITOT 0.8 04/06/2024 1428        Lab Results   Component Value Date    CRP <0.10 02/08/2024      Lab Results   Component Value Date    SEDRATE 3 02/08/2024      Lab Results   Component Value Date    NEUTROABS 2.42 04/06/2024     Lab Results   Component Value Date    ALT 16 04/06/2024    AST 25 04/06/2024    ALKPHOS 83 04/06/2024    BILITOT 0.8 04/06/2024     Lab Results   Component Value Date    CALCIUM 9.4 04/06/2024     EGD  Addendum: Impression    Possible mild esophageal candidiasis   Healthy previous fundoplication in the esophagus appears intact   Grade B esophagitis in the GE junction   A single large diverticulum was seen in the second portion of the duodenum       Findings   Mild abnormal mucosa with plaque in the esophagus; performed cold forceps  biopsy;   Healthy previous fundoplication in the esophagus; no bleeding was  identified   Grade B esophagitis with mucosal breaks measuring 5 mm or more not  continuous between folds, covering less than 75% of the circumference in  the GE junction   The stomach appeared normal.   Single large diverticulum in the 2nd part of the duodenum     Recommendation    Await pathology results     Will give her a course of fluconazole after results of biopsy available          Indication   Dysphagia, unspecified type     Staff   Staff Role    Bobby Rosario MD Proceduralist      Medications   See Anesthesia Record.      Preprocedure   A history and physical has been performed, and patient medication  allergies have been reviewed. The patient's tolerance of previous  anesthesia has been reviewed. The risks and benefits of the procedure and  the sedation options and risks were discussed with the patient. All  questions were answered and informed consent obtained.     Details of the Procedure   The patient underwent monitored anesthesia care, which was administered by  an anesthesia professional. The patient's blood pressure, ECG, ETCO2,  heart rate, level of consciousness, oxygen and respirations were monitored  throughout the procedure. The scope was introduced through the mouth and  advanced to the second part of the duodenum. Retroflexion was performed in  the cardia. The patient experienced no blood loss. The procedure was not  difficult. The patient did not tolerate the procedure well due to  persistent retching and constant bronchospasm. There were no apparent  adverse events.      Events   Procedure Events    Event  Event Time    ENDO SCOPE IN TIME 4/15/2024  2:57 PM    ENDO SCOPE OUT TIME 4/15/2024  3:01 PM      Specimens   ID Type Source Tests Collected by Time    1 : ESOPHAGUS BX'S, COLD BX Tissue ESOPHAGUS MID BIOPSY SURGICAL PATHOLOGY  EXAM Bobby Rosario MD 4/15/2024 1459      Procedure Location   [facility]   [address] [facility] [address]   [phone]     Referring Provider   Bobby Rosario MD   [address]   [address]   [place] [address]     Procedure Provider   Bobby Rosario MD  Narrative: Table formatting from the original result was not included.  Impression  Possible mild esophageal candidiasis  Healthy previous fundoplication in the esophagus appears intact  Grade B esophagitis in the GE junction  The stomach and duodenum appeared normal.    Findings  Mild abnormal mucosa with plaque in the esophagus; performed cold forceps   biopsy;  Healthy previous fundoplication in the esophagus; no bleeding was   identified  Grade B esophagitis with mucosal breaks measuring 5 mm or more not   continuous between folds, covering less than 75% of the circumference in   the GE junction  The stomach and duodenum appeared normal.    Recommendation   Await pathology results    Will give her a course of fluconazole after results of biopsy available        Indication  Dysphagia, unspecified type    Staff  Staff Role   Bobby Rosario MD Proceduralist     Medications  See Anesthesia Record.     Preprocedure  A history and physical has been performed, and patient medication   allergies have been reviewed. The patient's tolerance of previous   anesthesia has been reviewed. The risks and benefits of the procedure and   the sedation options and risks were discussed with the patient. All   questions were answered and informed consent obtained.    Details of the Procedure  The patient underwent monitored anesthesia care, which was administered by   an anesthesia professional. The patient's blood  pressure, ECG, ETCO2,   heart rate, level of consciousness, oxygen and respirations were monitored   throughout the procedure. The scope was introduced through the mouth and   advanced to the second part of the duodenum. Retroflexion was performed in   the cardia. The patient experienced no blood loss. The procedure was not   difficult. The patient did not tolerate the procedure well due to   persistent retching and constant bronchospasm. There were no apparent   adverse events.     Events  Procedure Events   Event Event Time   ENDO SCOPE IN TIME 4/15/2024  2:57 PM   ENDO SCOPE OUT TIME 4/15/2024  3:01 PM     Specimens  ID Type Source Tests Collected by Time   1 : ESOPHAGUS BX'S, COLD BX Tissue ESOPHAGUS MID BIOPSY SURGICAL PATHOLOGY   EXAM Bobby Rosario MD 4/15/2024 1459     Procedure Location  [facility]  [address] [facility] [address]  [phone]    Referring Provider  Bobby Rosario MD  [address] [facility]  [facility] [facility] [address]  [place] [address]    Procedure Provider  Bobby Rosario MD     === 04/06/24 ===  XR CHEST 1 VIEW  Left lower lobe infiltrate.      === 02/12/24 ===  DEXA BONE DENSITY: Normal      Assessment/Plan    This is a 68 y.o. female with MCTD? OP, OA, vitamin D def, Raynaud's, GERD, CVID, DL, venous insufficiency, chronic sinusitis, depression, migraine, scoliosis, RLS, Parkinson's, bronchiectasis, and pulmonary nodules, who presents for MCTD management, referred by pulm Dr. Duenas    The patient has a previous dx of MCTD with Dr. Coto, reports having Raynaud's since [facility], but was diagnosed with MCTD around 5 years ago. She has been on SSZ and HCQ, without any improvement. She was off HCQ for 2 months (misunderstood her pulm when starting amlodipine and she was already on nifedipine, thought she was supposed to stop the HCQ, not nifedipine). Reports no worsening with being off of it for 2 months. Patient has RP and dry mouth, no other sx suggestive of CTD, no IA, no  synovitis on exam. She has pitting edema in both ankles. Will repeat the work up. If negative, will taper her off the meds    Labs:  4/24: WBC 4K, ALC 1.2, rest of CBC, CMP wnl  2/24: ESR, CRP wnl  3/22: ESR, CBC wnl  ANCAs, KISHA 1:40, no EKATERINA panel    # MCTD?    - Labs today  - Xrays today  - I will inform the patient of the results and the need to stay or taper the meds     RTC in 1 3 months    Plan, including risks and benefits, was discussed with the patient, informed on how to reach us.     To schedule an appointment, call [phone]  To reach the rheumatology office, call [phone]    Carly Metzger MD      Division of Rheumatology  [facility]

## 2024-05-20 ENCOUNTER — TELEPHONE (OUTPATIENT)
Dept: PAIN MANAGEMENT | Age: 69
End: 2024-05-20

## 2024-05-20 NOTE — TELEPHONE ENCOUNTER
Patient called in requesting an update about moving forward with injections. She stated that the office was going to reach out to her pulmonologist to see if she could have them last week and still has not heard back with an update. Can these be ordered?

## 2024-05-21 NOTE — TELEPHONE ENCOUNTER
Spoke with patient, she will call Dr. Mao office and see if they will give her the okay or have them contact our office.

## 2024-05-24 ENCOUNTER — TELEPHONE (OUTPATIENT)
Dept: PULMONOLOGY | Facility: CLINIC | Age: 69
End: 2024-05-24
Payer: MEDICARE

## 2024-05-24 NOTE — TELEPHONE ENCOUNTER
Pt called and stated that she spoke with Dr. Mao office and they faxed over the okay yesterday and wants to move forward with the injections

## 2024-05-24 NOTE — TELEPHONE ENCOUNTER
Dr. Duenas is on call 7/15/2024. Moved apt from 8:30am to 10am per call schedule. Left detailed message to call back and reschedule if this does not work.   No

## 2024-05-29 ENCOUNTER — TELEPHONE (OUTPATIENT)
Dept: PAIN MANAGEMENT | Age: 69
End: 2024-05-29

## 2024-05-29 ENCOUNTER — HOSPITAL ENCOUNTER (OUTPATIENT)
Dept: RADIOLOGY | Facility: CLINIC | Age: 69
Discharge: HOME | End: 2024-05-29
Payer: MEDICARE

## 2024-05-29 ENCOUNTER — OFFICE VISIT (OUTPATIENT)
Dept: RHEUMATOLOGY | Facility: CLINIC | Age: 69
End: 2024-05-29
Payer: MEDICARE

## 2024-05-29 ENCOUNTER — LAB (OUTPATIENT)
Dept: LAB | Facility: LAB | Age: 69
End: 2024-05-29
Payer: MEDICARE

## 2024-05-29 VITALS
BODY MASS INDEX: 24.21 KG/M2 | WEIGHT: 150 LBS | RESPIRATION RATE: 20 BRPM | SYSTOLIC BLOOD PRESSURE: 104 MMHG | DIASTOLIC BLOOD PRESSURE: 66 MMHG | HEART RATE: 78 BPM | TEMPERATURE: 97.7 F

## 2024-05-29 DIAGNOSIS — E55.9 VITAMIN D DEFICIENCY: ICD-10-CM

## 2024-05-29 DIAGNOSIS — M35.1 MCTD (MIXED CONNECTIVE TISSUE DISEASE) (MULTI): ICD-10-CM

## 2024-05-29 DIAGNOSIS — M35.1 MCTD (MIXED CONNECTIVE TISSUE DISEASE) (MULTI): Primary | ICD-10-CM

## 2024-05-29 DIAGNOSIS — M32.9 SYSTEMIC LUPUS ERYTHEMATOSUS, UNSPECIFIED (MULTI): ICD-10-CM

## 2024-05-29 LAB
25(OH)D3 SERPL-MCNC: 26 NG/ML (ref 30–100)
ALBUMIN SERPL BCP-MCNC: 4.4 G/DL (ref 3.4–5)
ALP SERPL-CCNC: 101 U/L (ref 33–136)
ALT SERPL W P-5'-P-CCNC: 17 U/L (ref 7–45)
ANION GAP SERPL CALC-SCNC: 10 MMOL/L (ref 10–20)
APPEARANCE UR: CLEAR
AST SERPL W P-5'-P-CCNC: 24 U/L (ref 9–39)
BASOPHILS # BLD AUTO: 0.07 X10*3/UL (ref 0–0.1)
BASOPHILS NFR BLD AUTO: 1.2 %
BILIRUB SERPL-MCNC: 0.6 MG/DL (ref 0–1.2)
BILIRUB UR STRIP.AUTO-MCNC: NEGATIVE MG/DL
BUN SERPL-MCNC: 13 MG/DL (ref 6–23)
CALCIUM SERPL-MCNC: 9.8 MG/DL (ref 8.6–10.3)
CHLORIDE SERPL-SCNC: 106 MMOL/L (ref 98–107)
CO2 SERPL-SCNC: 28 MMOL/L (ref 21–32)
COLOR UR: YELLOW
CREAT SERPL-MCNC: 0.72 MG/DL (ref 0.5–1.05)
CREAT UR-MCNC: 40.3 MG/DL (ref 20–320)
CRP SERPL-MCNC: <0.1 MG/DL
EGFRCR SERPLBLD CKD-EPI 2021: >90 ML/MIN/1.73M*2
EOSINOPHIL # BLD AUTO: 0.42 X10*3/UL (ref 0–0.7)
EOSINOPHIL NFR BLD AUTO: 7 %
ERYTHROCYTE [DISTWIDTH] IN BLOOD BY AUTOMATED COUNT: 14.2 % (ref 11.5–14.5)
ERYTHROCYTE [SEDIMENTATION RATE] IN BLOOD BY WESTERGREN METHOD: 5 MM/H (ref 0–30)
GLUCOSE SERPL-MCNC: 85 MG/DL (ref 74–99)
GLUCOSE UR STRIP.AUTO-MCNC: NEGATIVE MG/DL
HCT VFR BLD AUTO: 37.6 % (ref 36–46)
HGB BLD-MCNC: 12.4 G/DL (ref 12–16)
IMM GRANULOCYTES # BLD AUTO: 0.01 X10*3/UL (ref 0–0.7)
IMM GRANULOCYTES NFR BLD AUTO: 0.2 % (ref 0–0.9)
KETONES UR STRIP.AUTO-MCNC: NEGATIVE MG/DL
LEUKOCYTE ESTERASE UR QL STRIP.AUTO: NEGATIVE
LYMPHOCYTES # BLD AUTO: 1.56 X10*3/UL (ref 1.2–4.8)
LYMPHOCYTES NFR BLD AUTO: 26.2 %
MCH RBC QN AUTO: 32.3 PG (ref 26–34)
MCHC RBC AUTO-ENTMCNC: 33 G/DL (ref 32–36)
MCV RBC AUTO: 98 FL (ref 80–100)
MONOCYTES # BLD AUTO: 0.55 X10*3/UL (ref 0.1–1)
MONOCYTES NFR BLD AUTO: 9.2 %
NEUTROPHILS # BLD AUTO: 3.35 X10*3/UL (ref 1.2–7.7)
NEUTROPHILS NFR BLD AUTO: 56.2 %
NITRITE UR QL STRIP.AUTO: NEGATIVE
NRBC BLD-RTO: 0 /100 WBCS (ref 0–0)
PH UR STRIP.AUTO: 7 [PH]
PLATELET # BLD AUTO: 244 X10*3/UL (ref 150–450)
POTASSIUM SERPL-SCNC: 4.2 MMOL/L (ref 3.5–5.3)
PROT SERPL-MCNC: 7.3 G/DL (ref 6.4–8.2)
PROT UR STRIP.AUTO-MCNC: NEGATIVE MG/DL
PROT UR-ACNC: 5 MG/DL (ref 5–24)
PROT/CREAT UR: 0.12 MG/MG CREAT (ref 0–0.17)
RBC # BLD AUTO: 3.84 X10*6/UL (ref 4–5.2)
RBC # UR STRIP.AUTO: NEGATIVE /UL
SODIUM SERPL-SCNC: 140 MMOL/L (ref 136–145)
SP GR UR STRIP.AUTO: 1.01
UROBILINOGEN UR STRIP.AUTO-MCNC: <2 MG/DL
WBC # BLD AUTO: 6 X10*3/UL (ref 4.4–11.3)

## 2024-05-29 PROCEDURE — 86431 RHEUMATOID FACTOR QUANT: CPT

## 2024-05-29 PROCEDURE — 85025 COMPLETE CBC W/AUTO DIFF WBC: CPT

## 2024-05-29 PROCEDURE — 80053 COMPREHEN METABOLIC PANEL: CPT

## 2024-05-29 PROCEDURE — 99204 OFFICE O/P NEW MOD 45 MIN: CPT | Performed by: STUDENT IN AN ORGANIZED HEALTH CARE EDUCATION/TRAINING PROGRAM

## 2024-05-29 PROCEDURE — 86160 COMPLEMENT ANTIGEN: CPT

## 2024-05-29 PROCEDURE — 85652 RBC SED RATE AUTOMATED: CPT

## 2024-05-29 PROCEDURE — 84156 ASSAY OF PROTEIN URINE: CPT

## 2024-05-29 PROCEDURE — 86235 NUCLEAR ANTIGEN ANTIBODY: CPT

## 2024-05-29 PROCEDURE — 81003 URINALYSIS AUTO W/O SCOPE: CPT

## 2024-05-29 PROCEDURE — 82570 ASSAY OF URINE CREATININE: CPT

## 2024-05-29 PROCEDURE — 36415 COLL VENOUS BLD VENIPUNCTURE: CPT

## 2024-05-29 PROCEDURE — 86200 CCP ANTIBODY: CPT

## 2024-05-29 PROCEDURE — 86225 DNA ANTIBODY NATIVE: CPT

## 2024-05-29 PROCEDURE — 1159F MED LIST DOCD IN RCRD: CPT | Performed by: STUDENT IN AN ORGANIZED HEALTH CARE EDUCATION/TRAINING PROGRAM

## 2024-05-29 PROCEDURE — 82306 VITAMIN D 25 HYDROXY: CPT

## 2024-05-29 PROCEDURE — 73610 X-RAY EXAM OF ANKLE: CPT | Mod: 50

## 2024-05-29 PROCEDURE — 73610 X-RAY EXAM OF ANKLE: CPT | Mod: BILATERAL PROCEDURE | Performed by: RADIOLOGY

## 2024-05-29 PROCEDURE — 86140 C-REACTIVE PROTEIN: CPT

## 2024-05-29 PROCEDURE — 1160F RVW MEDS BY RX/DR IN RCRD: CPT | Performed by: STUDENT IN AN ORGANIZED HEALTH CARE EDUCATION/TRAINING PROGRAM

## 2024-05-29 PROCEDURE — 1157F ADVNC CARE PLAN IN RCRD: CPT | Performed by: STUDENT IN AN ORGANIZED HEALTH CARE EDUCATION/TRAINING PROGRAM

## 2024-05-29 PROCEDURE — 86038 ANTINUCLEAR ANTIBODIES: CPT

## 2024-05-29 RX ORDER — HYDROXYCHLOROQUINE SULFATE 200 MG/1
200 TABLET, FILM COATED ORAL DAILY
Qty: 90 TABLET | Refills: 1 | Status: SHIPPED | OUTPATIENT
Start: 2024-05-29 | End: 2024-06-11 | Stop reason: ALTCHOICE

## 2024-05-29 RX ORDER — SULFASALAZINE 500 MG/1
1000 TABLET ORAL DAILY
Qty: 180 TABLET | Refills: 1 | Status: SHIPPED | OUTPATIENT
Start: 2024-05-29 | End: 2024-11-25

## 2024-05-29 NOTE — TELEPHONE ENCOUNTER
SI JOINT INJECTION    NO AUTH REQUIRED  OK TO SCHEDULE IN [place]    OK to schedule procedure approved as above.   Please note sides/levels approved and date range.   (If applicable, sides/levels approved may differ from those ordered)    TO BE SCHEDULED WITH

## 2024-05-30 LAB
ANA PATTERN: ABNORMAL
ANA SER QL HEP2 SUBST: POSITIVE
ANA TITR SER IF: ABNORMAL {TITER}
C3 SERPL-MCNC: 138 MG/DL (ref 87–200)
C4 SERPL-MCNC: 33 MG/DL (ref 10–50)
CCP IGG SERPL-ACNC: <1 U/ML
CENTROMERE B AB SER-ACNC: <0.2 AI
CHROMATIN AB SERPL-ACNC: <0.2 AI
DSDNA AB SER-ACNC: 1 IU/ML
ENA JO1 AB SER QL IA: <0.2 AI
ENA RNP AB SER IA-ACNC: 0.3 AI
ENA SCL70 AB SER QL IA: <0.2 AI
ENA SM AB SER IA-ACNC: <0.2 AI
ENA SM+RNP AB SER QL IA: 0.2 AI
ENA SS-A AB SER IA-ACNC: 0.2 AI
ENA SS-B AB SER IA-ACNC: <0.2 AI
RHEUMATOID FACT SER NEPH-ACNC: <10 IU/ML (ref 0–15)
RIBOSOMAL P AB SER-ACNC: <0.2 AI

## 2024-05-31 ENCOUNTER — OFFICE VISIT (OUTPATIENT)
Dept: INFECTIOUS DISEASES | Facility: CLINIC | Age: 69
End: 2024-05-31
Payer: MEDICARE

## 2024-05-31 VITALS
DIASTOLIC BLOOD PRESSURE: 64 MMHG | HEART RATE: 64 BPM | SYSTOLIC BLOOD PRESSURE: 113 MMHG | TEMPERATURE: 97.8 F | BODY MASS INDEX: 24.21 KG/M2 | WEIGHT: 150 LBS

## 2024-05-31 DIAGNOSIS — E55.9 VITAMIN D DEFICIENCY: Primary | ICD-10-CM

## 2024-05-31 DIAGNOSIS — R05.3 CHRONIC COUGH: ICD-10-CM

## 2024-05-31 DIAGNOSIS — J47.1 BRONCHIECTASIS WITH ACUTE EXACERBATION (MULTI): ICD-10-CM

## 2024-05-31 DIAGNOSIS — Z71.84 COUNSELING FOR TRAVEL: ICD-10-CM

## 2024-05-31 PROCEDURE — 1159F MED LIST DOCD IN RCRD: CPT | Performed by: INTERNAL MEDICINE

## 2024-05-31 PROCEDURE — 99203 OFFICE O/P NEW LOW 30 MIN: CPT | Performed by: INTERNAL MEDICINE

## 2024-05-31 PROCEDURE — 1160F RVW MEDS BY RX/DR IN RCRD: CPT | Performed by: INTERNAL MEDICINE

## 2024-05-31 PROCEDURE — 1036F TOBACCO NON-USER: CPT | Performed by: INTERNAL MEDICINE

## 2024-05-31 PROCEDURE — 1157F ADVNC CARE PLAN IN RCRD: CPT | Performed by: INTERNAL MEDICINE

## 2024-05-31 RX ORDER — CLARITHROMYCIN 500 MG/1
500 TABLET, FILM COATED ORAL 2 TIMES DAILY
Qty: 42 TABLET | Refills: 0 | Status: SHIPPED | OUTPATIENT
Start: 2024-05-31 | End: 2024-06-21

## 2024-05-31 RX ORDER — CHOLECALCIFEROL (VITAMIN D3) 50 MCG
50 TABLET ORAL DAILY
Qty: 90 TABLET | Refills: 1 | Status: SHIPPED | OUTPATIENT
Start: 2024-05-31 | End: 2024-11-27

## 2024-05-31 RX ORDER — CIPROFLOXACIN 750 MG/1
750 TABLET, FILM COATED ORAL ONCE AS NEEDED
Qty: 42 TABLET | Refills: 1 | Status: SHIPPED | OUTPATIENT
Start: 2024-05-31 | End: 2024-06-04 | Stop reason: SDUPTHER

## 2024-05-31 RX ORDER — TOBRAMYCIN INHALATION SOLUTION 300 MG/5ML
300 INHALANT RESPIRATORY (INHALATION)
Qty: 300 ML | Refills: 0 | Status: SHIPPED | OUTPATIENT
Start: 2024-05-31 | End: 2024-06-30

## 2024-05-31 NOTE — LETTER
05/31/24    Miguel Alvarado DO  [address]  [place] [facility] [address]      Dear Dr. Miguel Alvarado DO,    I am writing to confirm that your patient, Oneyda Parmar, received care in my office on 05/31/24. I have enclosed a summary of the care provided to Oneyda for your reference.    Please contact me with any questions you may have regarding the visit.    Sincerely,         Desmond Alex MD  [address] [address]  [facility] [address]    CC: No Recipients

## 2024-05-31 NOTE — PROGRESS NOTES
Prior to seeing the patient we reviewed records in the  system going back 5 years including notes imaging labs social history family history and past medical history.  We had seen the patient last in December 2020.  She is a 68 woman with a history of C VID who gets IVIG bronchiectasis with prior AVERY infection and prior Pseudomonas infection question mixed connective tissue disease.  She has been rereferred to pulmonary medicine a few months ago and had sputum cultures that grew Pseudomonas and Gordonia bronchialis.  Both isolates were pansensitive including quinolones.  Currently also on inhaled tobramycin.  Her biggest complaint is the persistent cough.  She says it never really goes away but has been worse the last few months.  No fevers chills night sweats.  No unintentional weight loss or significant loss of appetite.  No shortness of breath  Other labs done recently include CRP and sed rate that were normal.    On exam did not look acutely ill.  Normal mood and affect normal mentation.  Frequent nonproductive cough during the visit.  No difficulty speaking not tachypneic.  On lung auscultation had some rare scattered anterior rhonchi and faint expiratory wheezing.  Also had some bilateral ankle edema that was tender.  Otherwise exam unremarkable.    Assessment recommendations-patient has chronic bronchiectasis and chronic lung colonization and/or infection in the setting of C VID.  Both recent isolates are sensitive to quinolones.  I told her I would recommended a prolonged course of oral antibiotics along with inhaled tobramycin.  We want to give her Cipro 750 twice daily and went over side effects including tendinopathy.  We want to check and do some additional research to see if the gordonii species should be treated with multiple antibiotics as is typical for mycobacteria.  The goal would be to achieve some sort of remission with prolonged oral antibiotics followed by maintenance therapy with inhaled  antibiotics.   we are going to call her tomorrow with her final recommendation. We will arrange follow-up at that time    Update 5-31-24 1400  We did some additional reading about amarjit.  We wanted to treat the patient with Cipro 750 twice daily and clarithromycin 500 daily for 6 weeks.  We over drug interactions and she will hold her nifedipine and her Plaquenil.  She will contact me with any concerns.  We discussed the adverse reactions of the 2 medications that she knows to call

## 2024-05-31 NOTE — LETTER
05/31/24    Mary Duenas MD  [address]  [facility] [address]  [facility] [address]      Dear Dr. Mary Duenas MD,    Thank you for referring your patient, Oneyda Parmar, to receive care in my office. I have enclosed a summary of the care provided to Oneyda on 05/31/24.    Please contact me with any questions you may have regarding the visit.    Sincerely,         Desmond Alex MD  [address]  [facility] [address]    CC: No Recipients

## 2024-06-04 ENCOUNTER — TELEPHONE (OUTPATIENT)
Dept: INFECTIOUS DISEASES | Facility: CLINIC | Age: 69
End: 2024-06-04
Payer: MEDICARE

## 2024-06-04 DIAGNOSIS — Z71.84 COUNSELING FOR TRAVEL: ICD-10-CM

## 2024-06-04 RX ORDER — CIPROFLOXACIN 750 MG/1
750 TABLET, FILM COATED ORAL ONCE AS NEEDED
Qty: 42 TABLET | Refills: 1 | Status: SHIPPED | OUTPATIENT
Start: 2024-06-04 | End: 2024-06-25

## 2024-06-04 NOTE — TELEPHONE ENCOUNTER
Patient called with complaints of soreness and shortness of breath that started yesterday. She is now requesting a callback to discuss symptoms.

## 2024-06-05 ENCOUNTER — HOSPITAL ENCOUNTER (OUTPATIENT)
Age: 69
Discharge: HOME | End: 2024-06-05

## 2024-06-05 VITALS
TEMPERATURE: 97.9 F | BODY MASS INDEX: 24.99 KG/M2 | WEIGHT: 150 LBS | HEART RATE: 63 BPM | DIASTOLIC BLOOD PRESSURE: 78 MMHG | OXYGEN SATURATION: 98 % | SYSTOLIC BLOOD PRESSURE: 137 MMHG | HEIGHT: 65 IN

## 2024-06-05 DIAGNOSIS — G89.29 CHRONIC LOW BACK PAIN, UNSPECIFIED BACK PAIN LATERALITY, UNSPECIFIED WHETHER SCIATICA PRESENT: ICD-10-CM

## 2024-06-05 DIAGNOSIS — M54.50 CHRONIC LOW BACK PAIN, UNSPECIFIED BACK PAIN LATERALITY, UNSPECIFIED WHETHER SCIATICA PRESENT: ICD-10-CM

## 2024-06-10 NOTE — PROGRESS NOTES
Subjective     History of Present Illness:   Oneyda Parmar is a 68 y.o. female who presents to GI clinic for follow-up of dysphagia.  She has had a complicated history with prior esophageal surgery.  I last saw her she was having significant dysphagia and had recommended an EGD because of a suspected esophageal candidiasis patient was on multiple antibiotics for other infections.  Biopsies did not show candidiasis but we still treated with fluconazole.  .  She did not think that the fluconazole helped much anyways but now is on multiple more antibiotics because of this resistant Pseudomonas infection in her lung that she has.  She is quite concerned about this and is following up with infectious disease.  She is back because she is having worsening reflux since she has been on antibiotics.  She continues to have the problem swallowing but there appears to be chronic.        Review of Systems  Review of Systems   Constitutional: Negative.    Respiratory: Negative.     Cardiovascular: Negative.    Musculoskeletal: Negative.    Skin: Negative.        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), COVID-19 (09/21/2022), COVID-19 virus infection (06/21/2023), Dysphagia, unspecified, Encounter for screening for malignant neoplasm of cervix, Localized edema (05/04/2022), Parkinson's disease (Multi) (06/21/2022), Personal history of other diseases of the digestive system (07/18/2018), Personal history of other diseases of the musculoskeletal system and connective tissue, Personal history of other diseases of the respiratory system (10/08/2017), Personal history of other endocrine, nutritional and metabolic disease (08/08/2016), Personal history of other endocrine, nutritional and metabolic disease (10/08/2017), Personal history of other malignant neoplasm of skin, Personal history of other medical treatment, Personal  history of pneumonia (recurrent) (03/18/2020), Personal history of pneumonia (recurrent) (01/29/2021), Pulmonary mycobacterial infection (Multi), and Raynaud's syndrome without gangrene.     Social History   reports that she has never smoked. She has never been exposed to tobacco smoke. She has never used smokeless tobacco. She reports that she does not currently use alcohol. She reports that she does not use drugs.     Family History  family history includes COPD in her father; Prostate cancer in her brother; cerebrovascular accident in her mother.     Allergies  Allergies   Allergen Reactions    Doxycycline Other and Unknown    Prednisone Unknown     Red and hot when taking prednisone    Topiramate Other, Nausea Only, Palpitations and Unknown       Medications  Current Outpatient Medications   Medication Instructions    amLODIPine (NORVASC) 2.5 mg, oral, Daily    cholecalciferol (VITAMIN D-3) 50 mcg, oral, Daily    ciprofloxacin (CIPRO) 750 mg, oral, Once as needed    clarithromycin (BIAXIN) 500 mg, oral, 2 times daily    hydroxychloroquine (PLAQUENIL) 200 mg, oral, Daily    ibuprofen 600 mg, oral, Every 8 hours PRN    immune globulin, human, (Gammagard Liquid) infusion Infuse monthly as directed    linaCLOtide (LINZESS) 290 mcg, oral, Daily    NIFEdipine ER (ADALAT CC) 30 mg, oral, Daily    omeprazole (PRILOSEC) 20 mg, oral, Daily, Do not crush or chew.    pramipexole (MIRAPEX) 0.5 mg, oral, 3 times daily    pramipexole (MIRAPEX) 1 mg, oral, Nightly    propranolol LA (INDERAL LA) 80 mg, oral, Daily    rizatriptan (Maxalt) 10 mg tablet One po at onset of migraine; can repeat in 2 hrs;  max 20mg daily    sulfaSALAzine (AZULFIDINE) 1,000 mg, oral, Daily, 2 tabs once a day    tobramycin (Ashkan) 300 mg/5 mL nebulizer solution 300 mg, nebulization, 2 times daily RT       Objective   There were no vitals filed for this visit.  Physical Exam  Vitals reviewed.   Constitutional:       Appearance: Normal appearance.    Cardiovascular:      Rate and Rhythm: Normal rate and regular rhythm.      Pulses: Normal pulses.   Pulmonary:      Effort: Respiratory distress present.      Comments: Decreased air entry bilaterally  Abdominal:      General: Abdomen is flat. Bowel sounds are normal. There is no distension.      Palpations: Abdomen is soft.      Tenderness: There is no abdominal tenderness.   Musculoskeletal:         General: Normal range of motion.   Neurological:      Mental Status: She is alert.                 Assessment/Plan   Oneyda Parmar is a 68 y.o. female who presents to GI clinic for evaluation of worsening reflux.  Complicated history including history of prior Nissen fundoplication complicated by worsening postop dysphagia.  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  I do not think this is true acid reflux but probably a side effect of the medications.  She does have PPIs at home that she has been using and has been helping.  I told her to take omeprazole 40 mg once daily temporarily since it has been helping her symptoms and regular prescription for that.    Eventually might consider an Endoflip on her to make sure that there is no significant narrowing at the GE junction.  .  Will await until the rest of her pulmonary symptoms improve prior to considering that.            Bobby Rosario MD

## 2024-06-11 ENCOUNTER — OFFICE VISIT (OUTPATIENT)
Dept: GASTROENTEROLOGY | Facility: CLINIC | Age: 69
End: 2024-06-11
Payer: MEDICARE

## 2024-06-11 VITALS
WEIGHT: 148 LBS | DIASTOLIC BLOOD PRESSURE: 65 MMHG | HEART RATE: 68 BPM | SYSTOLIC BLOOD PRESSURE: 115 MMHG | BODY MASS INDEX: 23.89 KG/M2

## 2024-06-11 DIAGNOSIS — K21.9 GASTROESOPHAGEAL REFLUX DISEASE WITHOUT ESOPHAGITIS: Primary | ICD-10-CM

## 2024-06-11 PROCEDURE — 99214 OFFICE O/P EST MOD 30 MIN: CPT | Performed by: INTERNAL MEDICINE

## 2024-06-11 PROCEDURE — 1157F ADVNC CARE PLAN IN RCRD: CPT | Performed by: INTERNAL MEDICINE

## 2024-06-11 PROCEDURE — 1160F RVW MEDS BY RX/DR IN RCRD: CPT | Performed by: INTERNAL MEDICINE

## 2024-06-11 PROCEDURE — 1159F MED LIST DOCD IN RCRD: CPT | Performed by: INTERNAL MEDICINE

## 2024-06-11 RX ORDER — OMEPRAZOLE 40 MG/1
40 CAPSULE, DELAYED RELEASE ORAL DAILY
Qty: 30 CAPSULE | Refills: 3 | Status: SHIPPED | OUTPATIENT
Start: 2024-06-11

## 2024-06-11 ASSESSMENT — ENCOUNTER SYMPTOMS
CONSTITUTIONAL NEGATIVE: 1
RESPIRATORY NEGATIVE: 1
MUSCULOSKELETAL NEGATIVE: 1
CARDIOVASCULAR NEGATIVE: 1

## 2024-06-14 ENCOUNTER — APPOINTMENT (OUTPATIENT)
Dept: INFECTIOUS DISEASES | Facility: CLINIC | Age: 69
End: 2024-06-14
Payer: MEDICARE

## 2024-06-25 ENCOUNTER — APPOINTMENT (OUTPATIENT)
Dept: PRIMARY CARE | Facility: CLINIC | Age: 69
End: 2024-06-25
Payer: MEDICARE

## 2024-07-08 DIAGNOSIS — J47.1 BRONCHIECTASIS WITH ACUTE EXACERBATION (MULTI): Primary | ICD-10-CM

## 2024-07-11 NOTE — PROGRESS NOTES
Pulmonary clinic follow-up  Follow-up for chronic cough and sputum production   I have independently interviewed and examined the patient in the office and reviewed available records.    Physician HPI (7/15/2024):  Ms. CHAMBERS is a pleasant 66 year old woman with CVID on monthly IVIG, history of inactive mixed connective tissue disease, low back pain, Parkinson disease chronic pseudomonas pulmonary infection required multiple antibiotic courses who comes for follow-up for recurrent upper and lower respiratory tract infection.  She complains of chronic cough and sputum production of excessive amount yellowish in color more in the morning.  During her previous visit cultures and sensitivity were done she  grew Pseudomonas and  M Gordonii she was was maintained on tobramycin inhaler for the last 2 months.  She received multiple broad-spectrum antibiotic (including Augmentin and azithromycin )recently she was referred to infectious disease specialist Dr. Alex who prescribed for here clarithromycin and extended course of ciprofloxacin, but still complains of productive of yellowish sputum.    she  had past medical history of diagnosis with with Mixed connective tissue disease she was maintained on Plaquenil she was not following with rheumatologist for long time that she was referred to Dr. Sofi Metzger her recently.  Evaluation by rheumatologist not suspecting active mixed connective tissue disease Plaquenil and nifedipine were held.    He had a CT chest done in 2023 showed bilateral bronchiectasis and tree-in-bud appearance  sputum culture is positive for pseudomonas pneumonia negative for MAC    Immunization History:  Immunization History   Administered Date(s) Administered    Influenza, Seasonal, Quadrivalent, Adjuvanted 12/01/2021, 12/02/2022, 10/04/2023    Influenza, Unspecified 11/05/2018, 12/01/2021, 11/23/2022    Influenza, seasonal, injectable 10/15/2018, 11/23/2022    Influenza, trivalent, adjuvanted  10/07/2020    Meningococcal ACWY-D (Menactra) 4-valent conjugate vaccine 02/21/2018    Pfizer Purple Cap SARS-CoV-2 02/25/2021, 03/18/2021, 08/20/2021    Pneumococcal conjugate vaccine, 20-valent (PREVNAR 20) 10/01/2023       Family History:  Family History   Problem Relation Name Age of Onset    Other (cerebrovascular accident) Mother      COPD Father      Prostate cancer Brother         Social History:  Social History     Socioeconomic History    Marital status:    Tobacco Use    Smoking status: Never     Passive exposure: Never    Smokeless tobacco: Never   Vaping Use    Vaping status: Never Used   Substance and Sexual Activity    Alcohol use: Not Currently    Drug use: Never       Current Medications:  Current Outpatient Medications   Medication Instructions    cholecalciferol (VITAMIN D-3) 50 mcg, oral, Daily    ciprofloxacin (CIPRO) 750 mg, oral, 2 times daily    clarithromycin XL (BIAXIN XL) 1,000 mg, oral, Daily, Do not crush, chew, or split.    ibuprofen 600 mg, oral, Every 8 hours PRN    immune globulin, human, (Gammagard Liquid) infusion Infuse monthly as directed    omeprazole (PRILOSEC) 40 mg, oral, Daily, Do not crush or chew.    propranolol LA (INDERAL LA) 80 mg, oral, Daily    rizatriptan (Maxalt) 10 mg tablet One po at onset of migraine; can repeat in 2 hrs;  max 20mg daily    sulfaSALAzine (AZULFIDINE) 1,000 mg, oral, Daily, 2 tabs once a day    tiotropium (SPIRIVA WITH HANDIHALER) 18 mcg, inhalation, Daily RT    tobramycin (Ashkan 300) 300 mg/5 mL nebulizer solution 300 mg, nebulization, 2 times daily RT        Drug Allergies/Intolerances:  Allergies   Allergen Reactions    Doxycycline Other and Unknown    Prednisone Unknown     Red and hot when taking prednisone    Topiramate Other, Nausea Only, Palpitations and Unknown        Review of Systems:  Review of Systems     All other review of systems are negative and/or non-contributory.    Physical Examination:  /75 (BP Location: Left arm,  Patient Position: Sitting, BP Cuff Size: Adult)   Pulse 63   Temp 36.9 °C (98.4 °F) (Temporal)   Resp 16   SpO2 97%      General: ambulated independently; no acute distress; well-nourished; work of breathing was not increased; normal vocal character  HEENT: normocephalic; anicteric sclerae; conjunctivae not injected; nasal mucosa was unremarkable; oropharynx was clear without evidence of thrush; dentition was good.  Neck: supple; no lymphadenopathy or thyromegaly.  Chest: Bilateral rhonchi positive squeaks sign more on the left side   cardiac: regular rhythm; no gallop or murmur.  Abdomen: soft; non-tender; non-distended; no hepatosplenomegaly.  Extremities: no leg edema; no digital clubbing; 2+ pulses  Psychiatric: did not appear depressed or anxious.    Pulmonary Function Test Results     PFT - 1/31/18 - Printed    Chest Radiograph     XR chest 1 view 04/06/2024    Narrative  STUDY:  Chest Radiograph;  4/6/2024 1:19PM  INDICATION:  Pneumonia.  COMPARISON:  None Available.  ACCESSION NUMBER(S):  [id number]  ORDERING CLINICIAN:  LALI HELLER  TECHNIQUE:  Frontal chest was obtained at 14:19 hours.  FINDINGS:  CARDIOMEDIASTINAL SILHOUETTE:  Cardiomediastinal silhouette is normal in size and configuration.    LUNGS:  There is a retrocardiac mass most likely representing hiatal hernia.  There is a small infiltrate in the left lung base.    ABDOMEN:  No remarkable upper abdominal findings.    BONES:  No acute osseous changes.    Impression  Left lower lobe infiltrate.  Signed by Tigre Leslie MD      Echocardiogram     No testing done       Chest CT Scan     4/13/23  CT chest wo IV contrast  Status: Final result     PACS Images     Show images for CT chest wo IV contrast  Signed by    Signed Time Phone Pager   Ryan Mccullough MD 4/13/2023 20:54 [phone]     Exam Information    Status Exam Begun Exam Ended   Final 1/12/2023 16:36      Study Result    Narrative   Interpreted By:  RYAN MCCULLOUGH MD  MRN:  [id number]  Patient Name: GOLDEN CHAMBERS     STUDY:  CT CHEST WO CONTRAST;  4/12/2023 2:57 pm     INDICATION:  3 mos follow up surveilllance CT  R91.8: Pulmonary nodules.     COMPARISON:  12/12/2022     ACCESSION NUMBER(S):  [id number]     ORDERING CLINICIAN:  RAINE KEN     TECHNIQUE:  Helical data acquisition of the chest was obtained without the use of  IV contrast. Images were reformatted in axial, coronal, and sagittal  planes.     FINDINGS:  POTENTIAL LIMITATIONS OF THE STUDY:   Lack of IV contrast     HEART AND VESSELS:     There are mild atherosclerotic calcifications of the aorta and  branches. Aorta is unchanged in course and caliber.     The heart is not significantly enlarged.     No pericardial effusion is seen.     MEDIASTINUM AND NEENA, LOWER NECK AND AXILLA:  The visualized thyroid gland is within normal limits.     No evidence of thoracic lymphadenopathy by CT criteria.     Esophagus is stable in course and caliber. Small hiatal hernia.     LUNGS AND AIRWAYS:  The trachea and central airways are patent. No endobronchial lesion.     There are scattered tree-in-bud reticulonodular opacities, similar  distribution when compared to the previous study. There is also some  mucous plugging, most conspicuous in the lingula and right middle  lobe. No new areas of consolidation. No evidence of an effusion or  pneumothorax. There are calcified granulomas. Stable nodules  measuring 4 mm or less in size, for example, image 143 of 318.  Previously described spiculated nodule in the left upper lobe has  nearly completely resolved and likely represented an infectious or  inflammatory process.     UPPER ABDOMEN:  The visualized subdiaphragmatic structures demonstrate no acute  abnormality.     CHEST WALL AND OSSEOUS STRUCTURES:  Degenerative changes. No acute process.      Impression   Scattered reticulonodular tree-in-bud opacities, nonspecific, but  suggesting an infectious or inflammatory bronchiolitis,  "not  significantly changed when compared to the previous study.  Near complete resolution of the previously described spiculated  nodule within the left upper lobe, most likely  infectious/inflammatory in nature.          Co-morbidities Problem List    Assessment and Plan / Recommendations:  Problem List Items Addressed This Visit       Bronchiectasis without acute exacerbation (Multi)    CVID (common variable immunodeficiency) (Multi)    Abnormal CT of the chest - Primary    Relevant Medications    tiotropium (Spiriva with HandiHaler) 18 mcg inhalation capsule    Other Relevant Orders    CT chest wo IV contrast    Spirometry Pre/Post Bronchodilator    Atypical mycobacterial disease      Bronchiectasis exacerbation\"  Seen by ID expert Dr. Alex who prescribed clarithromycin and extended ciprofloxacin regimen  Currently she is on tobramycin inhaler  Sputum culture showed atypical positive growth of Gordonii and pseudomonas, recent culture  (Pending no growth so far )  previous history of atypical mycobacterial:  Persistent symptoms after multiple course of antibiotics  Recommend long-term antibiotic treatment of Gordonia given persistent respiratory symptoms even with multiple courses of antibiotic  CT chest shows tree-in-bud appearance t, In the setting of common variable immunodeficiency,  radiological and clinical manifestations and positive AFB cultures (even with new negative culture)  will order follow-up CT chest  Possibility of ??Gordonia bronchialis pneumonia/ bronchiectasis exacerbation. Currently, there are no standardized treatment guidelines for Gordonia infections. The choice and duration of antimicrobial therapy are often empirical, but should be adjusted based on patient individual response    COPD:  Non-smoker history of passive smoking  Ordered Spiriva  Ordered PFT with spirometry    ??Connective tissue disease:  Seen by rheumatology no evidence of active disease  Hydroxychloroquine and " nifedipine was stopped    Common variable immunodeficiency:  On regular IVIG    Other medical problems :  tremors: Secondary to early Parkinson's  Migraine on propranolol  Dysphagia s/p esophageal dilatation and botox injection  Overactive bladder  Neck pain     I saw and evaluated the patient. I personally obtained the key and critical portions of the history and physical exam or was physically present for key and critical portions . I have independently interviewed and examined the patient in the office and reviewed available records.  I personally reviewed CT images and lab report, I discussed with the patient the lab reports  Given her significant symptoms combined with abnormal CT chest and positive culture of Gordonia with history of combined variable immunodeficiency, I recommend long-term specific treatment for Gordonia, I will defer the treatment decision to ID expert.  CT chest is recommended to follow-up on pulm radiologic abnormality(tree on bud and bronchiectasis)    Professional time of this encounter: 60 min       Mary Duenas MD  07/15/2024

## 2024-07-12 ENCOUNTER — LAB (OUTPATIENT)
Dept: LAB | Facility: LAB | Age: 69
End: 2024-07-12
Payer: MEDICARE

## 2024-07-12 DIAGNOSIS — J47.1 BRONCHIECTASIS WITH ACUTE EXACERBATION (MULTI): ICD-10-CM

## 2024-07-12 LAB
BACTERIA SPEC RESP CULT: ABNORMAL
GRAM STN SPEC: ABNORMAL
HOLD SPECIMEN: NORMAL

## 2024-07-12 PROCEDURE — 87205 SMEAR GRAM STAIN: CPT

## 2024-07-12 PROCEDURE — 87206 SMEAR FLUORESCENT/ACID STAI: CPT

## 2024-07-12 PROCEDURE — 87015 SPECIMEN INFECT AGNT CONCNTJ: CPT

## 2024-07-12 PROCEDURE — 87116 MYCOBACTERIA CULTURE: CPT

## 2024-07-13 ENCOUNTER — LAB (OUTPATIENT)
Dept: LAB | Facility: LAB | Age: 69
End: 2024-07-13
Payer: MEDICARE

## 2024-07-13 DIAGNOSIS — J47.1 BRONCHIECTASIS WITH ACUTE EXACERBATION (MULTI): ICD-10-CM

## 2024-07-13 LAB
ACID FAST STN SPEC: NORMAL
HOLD SPECIMEN: NORMAL
MYCOBACTERIUM SPEC CULT: NORMAL

## 2024-07-13 PROCEDURE — 87206 SMEAR FLUORESCENT/ACID STAI: CPT

## 2024-07-13 PROCEDURE — 87116 MYCOBACTERIA CULTURE: CPT

## 2024-07-13 PROCEDURE — 87070 CULTURE OTHR SPECIMN AEROBIC: CPT

## 2024-07-13 PROCEDURE — 87015 SPECIMEN INFECT AGNT CONCNTJ: CPT

## 2024-07-13 PROCEDURE — 87075 CULTR BACTERIA EXCEPT BLOOD: CPT

## 2024-07-13 PROCEDURE — 87205 SMEAR GRAM STAIN: CPT

## 2024-07-14 LAB
BACTERIA SPEC RESP CULT: NORMAL
GRAM STN SPEC: NORMAL
GRAM STN SPEC: NORMAL

## 2024-07-15 ENCOUNTER — APPOINTMENT (OUTPATIENT)
Dept: PULMONOLOGY | Facility: CLINIC | Age: 69
End: 2024-07-15
Payer: MEDICARE

## 2024-07-15 VITALS
TEMPERATURE: 98.4 F | HEART RATE: 63 BPM | RESPIRATION RATE: 16 BRPM | DIASTOLIC BLOOD PRESSURE: 75 MMHG | OXYGEN SATURATION: 97 % | SYSTOLIC BLOOD PRESSURE: 120 MMHG

## 2024-07-15 DIAGNOSIS — D83.9 CVID (COMMON VARIABLE IMMUNODEFICIENCY) (MULTI): ICD-10-CM

## 2024-07-15 DIAGNOSIS — J47.9 BRONCHIECTASIS WITHOUT ACUTE EXACERBATION (MULTI): ICD-10-CM

## 2024-07-15 DIAGNOSIS — R93.89 ABNORMAL CT OF THE CHEST: Primary | ICD-10-CM

## 2024-07-15 DIAGNOSIS — A31.9 ATYPICAL MYCOBACTERIAL DISEASE: ICD-10-CM

## 2024-07-15 LAB
BACTERIA SPEC RESP CULT: NORMAL
GRAM STN SPEC: NORMAL
GRAM STN SPEC: NORMAL

## 2024-07-15 PROCEDURE — 1036F TOBACCO NON-USER: CPT | Performed by: INTERNAL MEDICINE

## 2024-07-15 PROCEDURE — 99215 OFFICE O/P EST HI 40 MIN: CPT | Performed by: INTERNAL MEDICINE

## 2024-07-15 PROCEDURE — 1157F ADVNC CARE PLAN IN RCRD: CPT | Performed by: INTERNAL MEDICINE

## 2024-07-15 PROCEDURE — 1159F MED LIST DOCD IN RCRD: CPT | Performed by: INTERNAL MEDICINE

## 2024-07-15 RX ORDER — TOBRAMYCIN INHALATION SOLUTION 300 MG/5ML
300 INHALANT RESPIRATORY (INHALATION)
COMMUNITY

## 2024-07-15 RX ORDER — TIOTROPIUM BROMIDE 18 UG/1
1 CAPSULE ORAL; RESPIRATORY (INHALATION)
Qty: 30 CAPSULE | Refills: 11 | Status: SHIPPED | OUTPATIENT
Start: 2024-07-15 | End: 2025-07-15

## 2024-07-15 RX ORDER — UMECLIDINIUM BROMIDE AND VILANTEROL TRIFENATATE 62.5; 25 UG/1; UG/1
1 POWDER RESPIRATORY (INHALATION) DAILY
Qty: 2 EACH | Refills: 0 | Status: SHIPPED | OUTPATIENT
Start: 2024-07-15 | End: 2024-07-15

## 2024-07-15 RX ORDER — CIPROFLOXACIN 500 MG/1
750 TABLET ORAL 2 TIMES DAILY
COMMUNITY

## 2024-07-15 RX ORDER — CLARITHROMYCIN 500 MG/1
1000 TABLET, FILM COATED, EXTENDED RELEASE ORAL DAILY
COMMUNITY

## 2024-07-17 ENCOUNTER — APPOINTMENT (OUTPATIENT)
Dept: PRIMARY CARE | Facility: CLINIC | Age: 69
End: 2024-07-17
Payer: MEDICARE

## 2024-07-17 ENCOUNTER — LAB (OUTPATIENT)
Dept: LAB | Facility: LAB | Age: 69
End: 2024-07-17
Payer: MEDICARE

## 2024-07-17 VITALS
SYSTOLIC BLOOD PRESSURE: 134 MMHG | DIASTOLIC BLOOD PRESSURE: 82 MMHG | HEIGHT: 63 IN | OXYGEN SATURATION: 98 % | BODY MASS INDEX: 26.93 KG/M2 | WEIGHT: 152 LBS | HEART RATE: 86 BPM

## 2024-07-17 DIAGNOSIS — Z13.29 SCREENING FOR THYROID DISORDER: ICD-10-CM

## 2024-07-17 DIAGNOSIS — D83.9 CVID (COMMON VARIABLE IMMUNODEFICIENCY) (MULTI): ICD-10-CM

## 2024-07-17 DIAGNOSIS — E78.5 BORDERLINE HYPERLIPIDEMIA: ICD-10-CM

## 2024-07-17 DIAGNOSIS — R06.02 SHORTNESS OF BREATH: ICD-10-CM

## 2024-07-17 DIAGNOSIS — M54.50 CHRONIC BILATERAL LOW BACK PAIN WITHOUT SCIATICA: ICD-10-CM

## 2024-07-17 DIAGNOSIS — J47.1 BRONCHIECTASIS WITH ACUTE EXACERBATION (MULTI): ICD-10-CM

## 2024-07-17 DIAGNOSIS — M40.03 POSTURAL KYPHOSIS OF CERVICOTHORACIC REGION: ICD-10-CM

## 2024-07-17 DIAGNOSIS — G89.29 CHRONIC BILATERAL LOW BACK PAIN WITHOUT SCIATICA: ICD-10-CM

## 2024-07-17 DIAGNOSIS — I10 HYPERTENSION, UNSPECIFIED TYPE: ICD-10-CM

## 2024-07-17 DIAGNOSIS — Z12.31 ENCOUNTER FOR SCREENING MAMMOGRAM FOR MALIGNANT NEOPLASM OF BREAST: ICD-10-CM

## 2024-07-17 DIAGNOSIS — G43.009 MIGRAINE WITHOUT AURA AND WITHOUT STATUS MIGRAINOSUS, NOT INTRACTABLE: Primary | ICD-10-CM

## 2024-07-17 DIAGNOSIS — Z00.00 MEDICARE ANNUAL WELLNESS VISIT, SUBSEQUENT: ICD-10-CM

## 2024-07-17 DIAGNOSIS — Z12.31 BREAST CANCER SCREENING BY MAMMOGRAM: ICD-10-CM

## 2024-07-17 DIAGNOSIS — G20.A1 PARKINSON'S DISEASE WITHOUT DYSKINESIA OR FLUCTUATING MANIFESTATIONS (MULTI): ICD-10-CM

## 2024-07-17 LAB
ACID FAST STN SPEC: NORMAL
ACID FAST STN SPEC: NORMAL
MYCOBACTERIUM SPEC CULT: NORMAL
MYCOBACTERIUM SPEC CULT: NORMAL

## 2024-07-17 PROCEDURE — 36415 COLL VENOUS BLD VENIPUNCTURE: CPT

## 2024-07-17 PROCEDURE — 84443 ASSAY THYROID STIM HORMONE: CPT

## 2024-07-17 PROCEDURE — 99215 OFFICE O/P EST HI 40 MIN: CPT | Performed by: FAMILY MEDICINE

## 2024-07-17 PROCEDURE — 3075F SYST BP GE 130 - 139MM HG: CPT | Performed by: FAMILY MEDICINE

## 2024-07-17 PROCEDURE — 1123F ACP DISCUSS/DSCN MKR DOCD: CPT | Performed by: FAMILY MEDICINE

## 2024-07-17 PROCEDURE — 1160F RVW MEDS BY RX/DR IN RCRD: CPT | Performed by: FAMILY MEDICINE

## 2024-07-17 PROCEDURE — 99497 ADVNCD CARE PLAN 30 MIN: CPT | Performed by: FAMILY MEDICINE

## 2024-07-17 PROCEDURE — G0439 PPPS, SUBSEQ VISIT: HCPCS | Performed by: FAMILY MEDICINE

## 2024-07-17 PROCEDURE — 93000 ELECTROCARDIOGRAM COMPLETE: CPT | Performed by: FAMILY MEDICINE

## 2024-07-17 PROCEDURE — 1157F ADVNC CARE PLAN IN RCRD: CPT | Performed by: FAMILY MEDICINE

## 2024-07-17 PROCEDURE — G0446 INTENS BEHAVE THER CARDIO DX: HCPCS | Performed by: FAMILY MEDICINE

## 2024-07-17 PROCEDURE — 1170F FXNL STATUS ASSESSED: CPT | Performed by: FAMILY MEDICINE

## 2024-07-17 PROCEDURE — 84439 ASSAY OF FREE THYROXINE: CPT

## 2024-07-17 PROCEDURE — 3079F DIAST BP 80-89 MM HG: CPT | Performed by: FAMILY MEDICINE

## 2024-07-17 PROCEDURE — 3008F BODY MASS INDEX DOCD: CPT | Performed by: FAMILY MEDICINE

## 2024-07-17 PROCEDURE — 1159F MED LIST DOCD IN RCRD: CPT | Performed by: FAMILY MEDICINE

## 2024-07-17 PROCEDURE — 1036F TOBACCO NON-USER: CPT | Performed by: FAMILY MEDICINE

## 2024-07-17 PROCEDURE — 1158F ADVNC CARE PLAN TLK DOCD: CPT | Performed by: FAMILY MEDICINE

## 2024-07-17 RX ORDER — PROPRANOLOL HYDROCHLORIDE 80 MG/1
80 CAPSULE, EXTENDED RELEASE ORAL DAILY
Qty: 90 CAPSULE | Refills: 2 | Status: SHIPPED | OUTPATIENT
Start: 2024-07-17

## 2024-07-17 RX ORDER — RIZATRIPTAN BENZOATE 10 MG/1
TABLET ORAL
Qty: 9 TABLET | Refills: 6 | Status: SHIPPED | OUTPATIENT
Start: 2024-07-17 | End: 2024-07-17

## 2024-07-17 RX ORDER — RIZATRIPTAN BENZOATE 10 MG/1
TABLET ORAL
Qty: 27 TABLET | Refills: 2 | Status: SHIPPED | OUTPATIENT
Start: 2024-07-17

## 2024-07-17 RX ORDER — PROPRANOLOL HYDROCHLORIDE 80 MG/1
80 CAPSULE, EXTENDED RELEASE ORAL DAILY
Qty: 30 CAPSULE | Refills: 6 | Status: SHIPPED | OUTPATIENT
Start: 2024-07-17 | End: 2024-07-17

## 2024-07-17 ASSESSMENT — PATIENT HEALTH QUESTIONNAIRE - PHQ9
SUM OF ALL RESPONSES TO PHQ9 QUESTIONS 1 AND 2: 0
2. FEELING DOWN, DEPRESSED OR HOPELESS: NOT AT ALL
1. LITTLE INTEREST OR PLEASURE IN DOING THINGS: NOT AT ALL

## 2024-07-17 ASSESSMENT — ACTIVITIES OF DAILY LIVING (ADL)
TAKING_MEDICATION: INDEPENDENT
MANAGING_FINANCES: INDEPENDENT
DRESSING: INDEPENDENT
BATHING: INDEPENDENT
DOING_HOUSEWORK: INDEPENDENT
GROCERY_SHOPPING: INDEPENDENT

## 2024-07-17 NOTE — PROGRESS NOTES
Annual Comprehensive Medical Exam and annual Medicare wellness visit    68 y.o. female presents for annual comprehensive medical evaluation and preventive services screening.  No recent hospitalizations, surgeries or significant injuries.    History of Present Illness    Bronchiectasis with current exac is being managed by pulmonology and infectious disease doctors.  Patient on multiple antibiotics.    Weight - used ozempic for 2 months.  Lost 22#.  Not taking Ozempic now due to cost.      DEXA 2/24  Colonoscopy 4/23  Mammogram 12/23 Cat 2.  Recommend annual    IBS w constipation:  linzess caused significant diarrhea.  Currently without constipation due to multiple antibiotic use.    Neurology: Patient has recently been diagnosed with early Parkinson's disease.    Complains of progressively worsening spine curvature/kyphosis.  She is doing exercises to try to decrease the kyphosis.    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)     Anti-pneumococcal polysaccharide antibody deficiency    COVID-19 09/21/2022    COVID-19 virus infection    COVID-19 virus infection 06/21/2023    Dysphagia, unspecified     Dysphagia, idiopathic    Encounter for screening for malignant neoplasm of cervix     Pap smear for cervical cancer screening    Localized edema 05/04/2022    Bilateral edema of lower extremity    Parkinson's disease (Multi) 06/21/2022    Parkinsonism    Personal history of other diseases of the digestive system 07/18/2018    History of hiatal hernia    Personal history of other diseases of the musculoskeletal system and connective tissue     History of osteoarthritis    Personal history of other diseases of the respiratory system 10/08/2017    History of asthma    Personal history of other endocrine, nutritional and metabolic disease 08/08/2016    History of hyperlipidemia    Personal history of other endocrine, nutritional and  metabolic disease 10/08/2017    History of hypothyroidism    Personal history of other malignant neoplasm of skin     History of malignant neoplasm of skin    Personal history of other medical treatment     History of screening mammography    Personal history of pneumonia (recurrent) 03/18/2020    History of pneumonia due to Pseudomonas    Personal history of pneumonia (recurrent) 01/29/2021    History of pneumonia due to Pseudomonas    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    Raynaud's syndrome without gangrene     Raynaud disease      Past Surgical History:   Procedure Laterality Date    BREAST LUMPECTOMY  06/23/2017    Right Breast Lumpectomy    COLONOSCOPY  07/29/2015    Complete Colonoscopy    GASTRIC FUNDOPLICATION  07/19/2018    Esophagogastric Fundoplasty Nissen Fundoplication    OTHER SURGICAL HISTORY  02/19/2015    Treatment Of Jaw    OTHER SURGICAL HISTORY  06/06/2019    Nissen fundoplication laparoscopic    OTHER SURGICAL HISTORY  06/23/2017    Biopsy Pleural    OTHER SURGICAL HISTORY  06/23/2017    Shoulder Repair    SINUS SURGERY  06/23/2017    Sinus Surgery    TONSILLECTOMY  06/23/2017    Tonsillectomy     Family History   Problem Relation Name Age of Onset    Other (cerebrovascular accident) Mother      COPD Father      Prostate cancer Brother        Social History     Socioeconomic History    Marital status:      Spouse name: Not on file    Number of children: Not on file    Years of education: Not on file    Highest education level: Not on file   Occupational History    Not on file   Tobacco Use    Smoking status: Never     Passive exposure: Never    Smokeless tobacco: Never   Vaping Use    Vaping status: Never Used   Substance and Sexual Activity    Alcohol use: Not Currently    Drug use: Never    Sexual activity: Not on file   Other Topics Concern    Not on file   Social History Narrative    Not on file     Social Determinants of Health      Financial Resource Strain: Not on file   Food Insecurity: Not on file   Transportation Needs: Not on file   Physical Activity: Not on file   Stress: Not on file   Social Connections: Not on file   Intimate Partner Violence: Not on file   Housing Stability: Not on file       Current Outpatient Medications on File Prior to Visit   Medication Sig Dispense Refill    ciprofloxacin (Cipro) 500 mg tablet Take 1.5 tablets (750 mg) by mouth 2 times a day.      clarithromycin XL (Biaxin XL) 500 mg 24 hr tablet Take 2 tablets (1,000 mg) by mouth once daily. Do not crush, chew, or split.      ibuprofen 600 mg tablet Take 1 tablet (600 mg) by mouth every 8 hours if needed for mild pain (1 - 3). 90 tablet 5    immune globulin, human, (Gammagard Liquid) infusion Infuse monthly as directed      omeprazole (PriLOSEC) 40 mg DR capsule Take 1 capsule (40 mg) by mouth once daily. Do not crush or chew. 30 capsule 3    propranolol LA (Inderal LA) 80 mg 24 hr capsule Take 1 capsule (80 mg) by mouth once daily. 30 capsule 6    rizatriptan (Maxalt) 10 mg tablet One po at onset of migraine; can repeat in 2 hrs;  max 20mg daily 9 tablet 6    sulfaSALAzine (Azulfidine) 500 mg tablet Take 2 tablets (1,000 mg) by mouth once daily. 2 tabs once a day 180 tablet 1    tiotropium (Spiriva with HandiHaler) 18 mcg inhalation capsule Place 1 capsule (18 mcg) into inhaler and inhale once daily. 30 capsule 11    tobramycin (Ashkan 300) 300 mg/5 mL nebulizer solution Take 5 mL (300 mg) by nebulization 2 times a day.      cholecalciferol (Vitamin D-3) 50 MCG (2000 UT) tablet Take 1 tablet (50 mcg) by mouth once daily. 90 tablet 1    [DISCONTINUED] amLODIPine (Norvasc) 2.5 mg tablet Take 1 tablet (2.5 mg) by mouth once daily. 30 tablet 11    [DISCONTINUED] linaCLOtide (Linzess) 145 mcg capsule Take 2 capsules (290 mcg) by mouth once daily.      [DISCONTINUED] umeclidinium-vilanteroL (Anoro Ellipta) 62.5-25 mcg/actuation blister with device Inhale 1 puff  "once daily. 2 each 0     No current facility-administered medications on file prior to visit.       Allergies   Allergen Reactions    Doxycycline Other and Unknown    Prednisone Unknown     Red and hot when taking prednisone    Topiramate Other, Nausea Only, Palpitations and Unknown       Complete review of systems is negative today except for that mentioned in the history of present illness.  In particular patient denies chest pain, shortness of breath, headaches and GI disturbances.      Visit Vitals  /82   Pulse 86   Ht 1.6 m (5' 3\")   Wt 68.9 kg (152 lb)   SpO2 98%   BMI 26.93 kg/m²   OB Status Postmenopausal   Smoking Status Never   BSA 1.75 m²      Physical Exam  Gen.: Alert and oriented ×3 female in no acute distress.  Constant productive cough during this office visit. HEENT: Head is normocephalic.  Pupils equal and reactive to light.  Tympanic membranes are clear.  Pharynx is clear.  Neck is supple without adenopathy or carotid bruits.  No masses or thyromegaly  Heart: Regular rate and rhythm without murmurs.  Lungs: Clear to auscultation bilaterally.  Abdomen: Soft with normal bowel sounds.  No masses or pain to palpation.  No bruits auscultated.  Extremities: Good range of motion of all joints.  No significant edema. Pedal pulses +1-2/4  Skin: No significant or irregular nevi visualized.  Neuro: No signs of focal neurologic deficit.  No tremor.  Speech and hearing are normal.  DTRs +2/4;  Muscle Strength +5/5.  Musculoskeletal: Spine with fair ROM.  Patient unable to lie supine with her head on a pillow due to kyphosis and lower back pain.  No scoliosis.  Leg lengths are equal.  Thoracic spine has slight increase in kyphosis which gives appearance of cervical spine anterior flexion  Psych: normal affect.  No suicidal ideation.  Good judgement and insight.     The 10-year ASCVD risk score (Georges DK, et al., 2019) is: 12.1%    Values used to calculate the score:      Age: 68 years      Sex: Female    "   Is Non- : No      Diabetic: No      Tobacco smoker: No      Systolic Blood Pressure: 134 mmHg      Is BP treated: Yes      HDL Cholesterol: 60.2 mg/dL      Total Cholesterol: 197 mg/dL  I discussed face-to-face with this individual discussing their cardiovascular risk and behavioral therapies of nutritional choices, exercise and elimination of habits contributing to risk.  We agreed on a plan how they may be able to reduce their current cardiovascular risk.  For patients with risk calculation greater than 10%, aspirin use was discussed and encouraged unless known allergy or increased risk of bleeding contraindicate use.  15 minutes.      Diagnosis/Plan  1. Migraine without aura and without status migrainosus, not intractable  - propranolol LA (Inderal LA) 80 mg 24 hr capsule; Take 1 capsule (80 mg) by mouth once daily.  Dispense: 90 capsule; Refill: 2  - ECG 12 Lead  - rizatriptan (Maxalt) 10 mg tablet; One po at onset of migraine; can repeat in 2 hrs;  max 20mg daily  Dispense: 27 tablet; Refill: 2    2. Postural kyphosis of cervicothoracic region  Continue home exercise program    3. Chronic bilateral low back pain without sciatica   Continue home exercise program.  Seek physical therapist treatment if symptoms persist or worsen.    4. Medicare annual wellness visit, subsequent  Living Will / Advanced Care Planning: I spent more than 15 minutes discussing advance care planning including explanation and discussion of advanced directives.  If patient does not have current up-to-date documents, examples and information were provided on how to create both living will and power of .  Patient was encouraged to work on completing these documents.      5. Bronchiectasis with acute exacerbation (Multi)  Continue management by pulmonologist and infectious disease specialist    6. Breast cancer screening by mammogram  - BI mammo bilateral screening tomosynthesis; Future    7. Encounter for  screening mammogram for malignant neoplasm of breast  - BI mammo bilateral screening tomosynthesis; Future    8. CVID (common variable immunodeficiency) (Multi)  Continue IVIG infusions at the direction of immunologist    9. Parkinson's disease without dyskinesia or fluctuating manifestations (Multi)    Continue assessment by neurologist.        Follow up in 6 months for medical management    I will continue to monitor, evaluate, assess and treat all problems/diagnoses as appropriate and continue to collaborate with specialists.    Contact office or send a  MY Chart message with any questions or concerns    Encouraged to sign up with my  My Chart  Patient will only be notified of labs that require medical intervention.    Prescriptions will not be filled unless you are compliant with your follow up appointments or have a follow up appointment scheduled as per instruction of your physician. Refills should be requested at the time of your visit.    **Charting was completed using voice recognition technology and may include unintended errors**    Miguel Alvarado DO, JOSE  [address] [id number]  [place] [address] [phone]      Miguel Alvarado DO, JOSE

## 2024-07-18 LAB
T4 FREE SERPL-MCNC: 1.11 NG/DL (ref 0.78–1.48)
TSH SERPL-ACNC: 4.18 MIU/L (ref 0.44–3.98)

## 2024-07-19 ENCOUNTER — HOSPITAL ENCOUNTER (OUTPATIENT)
Dept: RADIOLOGY | Facility: CLINIC | Age: 69
Discharge: HOME | End: 2024-07-19
Payer: MEDICARE

## 2024-07-19 DIAGNOSIS — R93.89 ABNORMAL CT OF THE CHEST: ICD-10-CM

## 2024-07-19 PROCEDURE — 71250 CT THORAX DX C-: CPT

## 2024-07-20 ENCOUNTER — LAB (OUTPATIENT)
Dept: LAB | Facility: LAB | Age: 69
End: 2024-07-20
Payer: MEDICARE

## 2024-07-20 DIAGNOSIS — E78.5 BORDERLINE HYPERLIPIDEMIA: ICD-10-CM

## 2024-07-20 DIAGNOSIS — J47.1 BRONCHIECTASIS WITH ACUTE EXACERBATION (MULTI): ICD-10-CM

## 2024-07-20 LAB
CHOLEST SERPL-MCNC: 210 MG/DL (ref 0–199)
CHOLESTEROL/HDL RATIO: 3.8
HDLC SERPL-MCNC: 55.9 MG/DL
HOLD SPECIMEN: NORMAL
LDLC SERPL CALC-MCNC: 128 MG/DL
NON HDL CHOLESTEROL: 154 MG/DL (ref 0–149)
TRIGL SERPL-MCNC: 132 MG/DL (ref 0–149)
VLDL: 26 MG/DL (ref 0–40)

## 2024-07-20 PROCEDURE — 87206 SMEAR FLUORESCENT/ACID STAI: CPT

## 2024-07-20 PROCEDURE — 36415 COLL VENOUS BLD VENIPUNCTURE: CPT

## 2024-07-20 PROCEDURE — 80061 LIPID PANEL: CPT

## 2024-07-20 PROCEDURE — 87015 SPECIMEN INFECT AGNT CONCNTJ: CPT

## 2024-07-20 PROCEDURE — 87116 MYCOBACTERIA CULTURE: CPT

## 2024-07-24 LAB
ACID FAST STN SPEC: NORMAL
MYCOBACTERIUM SPEC CULT: NORMAL

## 2024-07-25 ENCOUNTER — HOSPITAL ENCOUNTER (OUTPATIENT)
Dept: RESPIRATORY THERAPY | Facility: HOSPITAL | Age: 69
Discharge: HOME | End: 2024-07-25
Payer: MEDICARE

## 2024-07-25 DIAGNOSIS — R93.89 ABNORMAL CT OF THE CHEST: ICD-10-CM

## 2024-07-25 LAB
MGC ASCENT PFT - FEV1 - POST: 1.89
MGC ASCENT PFT - FEV1 - PRE: 1.72
MGC ASCENT PFT - FEV1 - PREDICTED: 2.07
MGC ASCENT PFT - FVC - POST: 2.6
MGC ASCENT PFT - FVC - PRE: 2.43
MGC ASCENT PFT - FVC - PREDICTED: 2.65

## 2024-07-25 PROCEDURE — 94060 EVALUATION OF WHEEZING: CPT

## 2024-07-26 ENCOUNTER — APPOINTMENT (OUTPATIENT)
Dept: INFECTIOUS DISEASES | Facility: CLINIC | Age: 69
End: 2024-07-26
Payer: MEDICARE

## 2024-07-26 VITALS — DIASTOLIC BLOOD PRESSURE: 67 MMHG | TEMPERATURE: 98.2 F | SYSTOLIC BLOOD PRESSURE: 114 MMHG | HEART RATE: 59 BPM

## 2024-07-26 DIAGNOSIS — J47.1 BRONCHIECTASIS WITH ACUTE EXACERBATION (MULTI): Primary | ICD-10-CM

## 2024-07-26 PROCEDURE — 99215 OFFICE O/P EST HI 40 MIN: CPT | Performed by: INTERNAL MEDICINE

## 2024-07-26 PROCEDURE — 1159F MED LIST DOCD IN RCRD: CPT | Performed by: INTERNAL MEDICINE

## 2024-07-26 PROCEDURE — 1157F ADVNC CARE PLAN IN RCRD: CPT | Performed by: INTERNAL MEDICINE

## 2024-07-26 PROCEDURE — 1126F AMNT PAIN NOTED NONE PRSNT: CPT | Performed by: INTERNAL MEDICINE

## 2024-07-26 RX ORDER — AMOXICILLIN AND CLAVULANATE POTASSIUM 875; 125 MG/1; MG/1
1 TABLET, FILM COATED ORAL EVERY 12 HOURS
Qty: 20 TABLET | Refills: 0 | Status: SHIPPED | OUTPATIENT
Start: 2024-07-26 | End: 2024-08-05

## 2024-07-26 RX ORDER — ALBUTEROL SULFATE 90 UG/1
2 AEROSOL, METERED RESPIRATORY (INHALATION) EVERY 4 HOURS PRN
COMMUNITY
Start: 2024-07-11

## 2024-07-26 ASSESSMENT — PAIN SCALES - GENERAL: PAINLEVEL: 0-NO PAIN

## 2024-07-26 NOTE — PROGRESS NOTES
Update July 26  Recent chest CT imaging showed bronchiectasis with tree-in-bud evidence of mucous plugging.  Since her last visit we have had 3 AFB sputum cultures that so far negative.  Most recent respiratory routine culture was negative.  Patient continues to take Cipro.  She is also on inhaled tobramycin.  When seen today she is concerned with sinus congestion.  No fevers or chills.  Cough is minimally productive in the last few days.    On exam she looks well.  Examination lungs revealed some rhonchi in the mid lungs bilaterally but otherwise good air movement no rash no adenopathy.    Given her underlying immunodeficiency is ongoing concern for acute or chronic infections in the setting of bronchiectasis.  Today's presentation is concerning for sinusitis despite being on Cipro.  Will get give her Augmentin for sinus infection.  I told her the good news was that so far there has been no AVERY and 3 sputum's.  Will continue to monitor routine respiratory cultures.  Will see her back in a couple months      From 5/31/24  Prior to seeing the patient we reviewed records in the  system going back 5 years including notes imaging labs social history family history and past medical history.  We had seen the patient last in December 2020.  She is a 68 woman with a history of C VID who gets IVIG bronchiectasis with prior AVERY infection and prior Pseudomonas infection question mixed connective tissue disease.  She has been rereferred to pulmonary medicine a few months ago and had sputum cultures that grew Pseudomonas and Gordonia bronchialis.  Both isolates were pansensitive including quinolones.  Currently also on inhaled tobramycin.  Her biggest complaint is the persistent cough.  She says it never really goes away but has been worse the last few months.  No fevers chills night sweats.  No unintentional weight loss or significant loss of appetite.  No shortness of breath  Other labs done recently include CRP and sed rate  that were normal.    On exam did not look acutely ill.  Normal mood and affect normal mentation.  Frequent nonproductive cough during the visit.  No difficulty speaking not tachypneic.  On lung auscultation had some rare scattered anterior rhonchi and faint expiratory wheezing.  Also had some bilateral ankle edema that was tender.  Otherwise exam unremarkable.    Assessment recommendations-patient has chronic bronchiectasis and chronic lung colonization and/or infection in the setting of C VID.  Both recent isolates are sensitive to quinolones.  I told her I would recommended a prolonged course of oral antibiotics along with inhaled tobramycin.  We want to give her Cipro 750 twice daily and went over side effects including tendinopathy.  We want to check and do some additional research to see if the gordonii species should be treated with multiple antibiotics as is typical for mycobacteria.  The goal would be to achieve some sort of remission with prolonged oral antibiotics followed by maintenance therapy with inhaled antibiotics.   we are going to call her tomorrow with her final recommendation. We will arrange follow-up at that time    Update 5-31-24 1400  We did some additional reading about sarbjitii.  We wanted to treat the patient with Cipro 750 twice daily and clarithromycin 500 daily for 6 weeks.  We over drug interactions and she will hold her nifedipine and her Plaquenil.  She will contact me with any concerns.  We discussed the adverse reactions of the 2 medications that she knows to call

## 2024-07-26 NOTE — LETTER
07/26/24    Miguel Alvarado DO  [id number] [facility] [address]  [facility] [address]      Dear Dr. Miguel Alvarado DO,    I am writing to confirm that your patient, Oneyda Parmar, received care in my office on 07/26/24. I have enclosed a summary of the care provided to Oneyda for your reference.    Please contact me with any questions you may have regarding the visit.    Sincerely,         Desmond Alex MD  [address] [facility] [address]  [facility] [address]    CC: No Recipients

## 2024-07-26 NOTE — PATIENT INSTRUCTIONS
You may have chronic lung infections- but these are not the type of infections where I would worry about a steroid infection for back pain.  So from the ID standpoint - ok for a localized steroid injections

## 2024-07-27 ENCOUNTER — LAB (OUTPATIENT)
Dept: LAB | Facility: LAB | Age: 69
End: 2024-07-27
Payer: MEDICARE

## 2024-07-27 DIAGNOSIS — J47.1 BRONCHIECTASIS WITH ACUTE EXACERBATION (MULTI): ICD-10-CM

## 2024-07-27 LAB — HOLD SPECIMEN: NORMAL

## 2024-07-27 PROCEDURE — 87116 MYCOBACTERIA CULTURE: CPT

## 2024-07-27 PROCEDURE — 87206 SMEAR FLUORESCENT/ACID STAI: CPT

## 2024-07-27 PROCEDURE — 87015 SPECIMEN INFECT AGNT CONCNTJ: CPT

## 2024-07-31 LAB
ACID FAST STN SPEC: NORMAL
MYCOBACTERIUM SPEC CULT: NORMAL

## 2024-08-03 ENCOUNTER — LAB (OUTPATIENT)
Dept: LAB | Facility: LAB | Age: 69
End: 2024-08-03
Payer: MEDICARE

## 2024-08-03 DIAGNOSIS — J47.1 BRONCHIECTASIS WITH ACUTE EXACERBATION (MULTI): ICD-10-CM

## 2024-08-03 LAB — HOLD SPECIMEN: NORMAL

## 2024-08-03 PROCEDURE — 87116 MYCOBACTERIA CULTURE: CPT

## 2024-08-03 PROCEDURE — 87015 SPECIMEN INFECT AGNT CONCNTJ: CPT

## 2024-08-03 PROCEDURE — 87206 SMEAR FLUORESCENT/ACID STAI: CPT

## 2024-08-07 LAB
ACID FAST STN SPEC: NORMAL
MYCOBACTERIUM SPEC CULT: NORMAL

## 2024-08-14 LAB
ACID FAST STN SPEC: NORMAL
MYCOBACTERIUM SPEC CULT: NORMAL

## 2024-08-21 LAB
ACID FAST STN SPEC: NORMAL
MYCOBACTERIUM SPEC CULT: NORMAL

## 2024-08-28 ENCOUNTER — PROCEDURE VISIT (OUTPATIENT)
Age: 69
End: 2024-08-28
Payer: MEDICARE

## 2024-08-28 VITALS
OXYGEN SATURATION: 98 % | HEART RATE: 69 BPM | SYSTOLIC BLOOD PRESSURE: 136 MMHG | DIASTOLIC BLOOD PRESSURE: 80 MMHG | TEMPERATURE: 97.1 F | WEIGHT: 155 LBS | BODY MASS INDEX: 25.83 KG/M2 | HEIGHT: 65 IN

## 2024-08-28 DIAGNOSIS — M46.1 SACROILIITIS (HCC): Primary | ICD-10-CM

## 2024-08-28 LAB
ACID FAST STN SPEC: NORMAL
MYCOBACTERIUM SPEC CULT: NORMAL

## 2024-08-28 PROCEDURE — G0260 INJ FOR SACROILIAC JT ANESTH: HCPCS | Performed by: PAIN MEDICINE

## 2024-08-28 PROCEDURE — 27096 INJECT SACROILIAC JOINT: CPT | Performed by: PAIN MEDICINE

## 2024-08-28 RX ORDER — BETAMETHASONE SODIUM PHOSPHATE AND BETAMETHASONE ACETATE 3; 3 MG/ML; MG/ML
6 INJECTION, SUSPENSION INTRA-ARTICULAR; INTRALESIONAL; INTRAMUSCULAR; SOFT TISSUE ONCE
Status: COMPLETED | OUTPATIENT
Start: 2024-08-28 | End: 2024-08-28

## 2024-08-28 RX ORDER — LIDOCAINE HYDROCHLORIDE 10 MG/ML
2 INJECTION, SOLUTION EPIDURAL; INFILTRATION; INTRACAUDAL; PERINEURAL ONCE
Status: COMPLETED | OUTPATIENT
Start: 2024-08-28 | End: 2024-08-28

## 2024-08-28 RX ADMIN — LIDOCAINE HYDROCHLORIDE 2 ML: 10 INJECTION, SOLUTION EPIDURAL; INFILTRATION; INTRACAUDAL; PERINEURAL at 09:28

## 2024-08-28 RX ADMIN — BETAMETHASONE SODIUM PHOSPHATE AND BETAMETHASONE ACETATE 6 MG: 3; 3 INJECTION, SUSPENSION INTRA-ARTICULAR; INTRALESIONAL; INTRAMUSCULAR at 09:27

## 2024-08-28 NOTE — PROGRESS NOTES
Patient Name: Yanely Sanford  : 1955     Date:  2024      Physician: CLARISSA MCCULLOUGH MD        Yanely Sanford is here today for interventional pain management.    Preoperatively, the patient presents with SI joint mediated pain, as per history and exam. Patient has failed NSAIDs, PT, and conservative treatment. Patient has significant psychological and functional impairment due to this condition.    Discussed the risks including but not limited to bleeding, infection, worsened pain, damage to surrounding structures, side effects, toxicity, allergic reactions to medications used, need for surgery, abdominal and visceral injury, as well as catastrophic injury such as vision loss, paralysis, spinal cord or plexus injury, stroke, bowel or bladder incontinence, chest tube insertion, ventilator dependence, and death. Discussed the risks, benefits, and alternatives to the procedure including no procedure at all. Discussed that we cannot undo any permanent neurologic or orthopaedic damage or change the course of any underlying disease. After thorough discussion, patient expressed understanding and willingness to proceed. Written consent was obtained and is in the chart. Verbal consent to proceed was obtained.    Standard ASIPP guidelines were followed and sterile technique used.  Area was cleaned with Betadine x3.  Informed consent was obtained.  Fluoroscopic guidance was used for this procedure.     S.I. JOINT:  Bilateral  Appropriate length spinal needle was advanced to the S.I. Joint.  Position was confirmed with contrast and lack of vascular uptake. Negative aspiration was achieved. In total, approximately 3mg of Betamethasone and 1ml of 1% preservative free Lidocaine was injected without difficulty into each site.    Patient tolerated the procedure well, no obvious complications occurred during the procedure.  Patient was appropriately monitored and discharged home in stable condition  with their usual motor strength. Post Op Instructions were given to patient. Relevant and recent imaging reviewed with patient today.                                      CLARISSA MCCULLOUGH MD

## 2024-09-03 LAB
ACID FAST STN SPEC: NORMAL
MYCOBACTERIUM SPEC CULT: NORMAL

## 2024-09-04 ENCOUNTER — APPOINTMENT (OUTPATIENT)
Dept: RHEUMATOLOGY | Facility: CLINIC | Age: 69
End: 2024-09-04
Payer: MEDICARE

## 2024-09-11 LAB
ACID FAST STN SPEC: NORMAL
MYCOBACTERIUM SPEC CULT: NORMAL

## 2024-09-12 ENCOUNTER — TELEPHONE (OUTPATIENT)
Dept: GASTROENTEROLOGY | Facility: CLINIC | Age: 69
End: 2024-09-12
Payer: MEDICARE

## 2024-09-12 ENCOUNTER — OFFICE VISIT (OUTPATIENT)
Age: 69
End: 2024-09-12
Payer: MEDICARE

## 2024-09-12 VITALS
WEIGHT: 155 LBS | BODY MASS INDEX: 25.83 KG/M2 | HEIGHT: 65 IN | SYSTOLIC BLOOD PRESSURE: 124 MMHG | TEMPERATURE: 97 F | DIASTOLIC BLOOD PRESSURE: 74 MMHG

## 2024-09-12 DIAGNOSIS — M47.816 LUMBAR SPONDYLOSIS: ICD-10-CM

## 2024-09-12 DIAGNOSIS — M54.16 LUMBAR RADICULOPATHY: Primary | ICD-10-CM

## 2024-09-12 DIAGNOSIS — M46.1 SACROILIITIS (HCC): ICD-10-CM

## 2024-09-12 DIAGNOSIS — M48.061 LUMBAR FORAMINAL STENOSIS: ICD-10-CM

## 2024-09-12 PROCEDURE — 99214 OFFICE O/P EST MOD 30 MIN: CPT | Performed by: NURSE PRACTITIONER

## 2024-09-12 PROCEDURE — G8427 DOCREV CUR MEDS BY ELIG CLIN: HCPCS | Performed by: NURSE PRACTITIONER

## 2024-09-12 PROCEDURE — G8399 PT W/DXA RESULTS DOCUMENT: HCPCS | Performed by: NURSE PRACTITIONER

## 2024-09-12 PROCEDURE — G8419 CALC BMI OUT NRM PARAM NOF/U: HCPCS | Performed by: NURSE PRACTITIONER

## 2024-09-12 PROCEDURE — 1036F TOBACCO NON-USER: CPT | Performed by: NURSE PRACTITIONER

## 2024-09-12 PROCEDURE — 1090F PRES/ABSN URINE INCON ASSESS: CPT | Performed by: NURSE PRACTITIONER

## 2024-09-12 PROCEDURE — 99215 OFFICE O/P EST HI 40 MIN: CPT

## 2024-09-12 PROCEDURE — 1123F ACP DISCUSS/DSCN MKR DOCD: CPT | Performed by: NURSE PRACTITIONER

## 2024-09-12 PROCEDURE — 3017F COLORECTAL CA SCREEN DOC REV: CPT | Performed by: NURSE PRACTITIONER

## 2024-09-12 ASSESSMENT — ENCOUNTER SYMPTOMS
GASTROINTESTINAL NEGATIVE: 1
TROUBLE SWALLOWING: 0
EYES NEGATIVE: 1
CONSTIPATION: 0
BACK PAIN: 1
COUGH: 0
SHORTNESS OF BREATH: 0
DIARRHEA: 0

## 2024-09-13 ENCOUNTER — TELEPHONE (OUTPATIENT)
Age: 69
End: 2024-09-13

## 2024-09-16 ENCOUNTER — OFFICE VISIT (OUTPATIENT)
Dept: NEUROLOGY | Facility: HOSPITAL | Age: 69
End: 2024-09-16
Payer: MEDICARE

## 2024-09-16 VITALS
WEIGHT: 157 LBS | SYSTOLIC BLOOD PRESSURE: 131 MMHG | HEIGHT: 63 IN | DIASTOLIC BLOOD PRESSURE: 68 MMHG | RESPIRATION RATE: 18 BRPM | BODY MASS INDEX: 27.82 KG/M2 | TEMPERATURE: 97.1 F | HEART RATE: 63 BPM

## 2024-09-16 DIAGNOSIS — G20.A1 PARKINSON'S DISEASE WITHOUT DYSKINESIA OR FLUCTUATING MANIFESTATIONS: Primary | ICD-10-CM

## 2024-09-16 PROCEDURE — 99213 OFFICE O/P EST LOW 20 MIN: CPT | Performed by: PSYCHIATRY & NEUROLOGY

## 2024-09-16 PROCEDURE — 3008F BODY MASS INDEX DOCD: CPT | Performed by: PSYCHIATRY & NEUROLOGY

## 2024-09-16 PROCEDURE — 1126F AMNT PAIN NOTED NONE PRSNT: CPT | Performed by: PSYCHIATRY & NEUROLOGY

## 2024-09-16 PROCEDURE — 1157F ADVNC CARE PLAN IN RCRD: CPT | Performed by: PSYCHIATRY & NEUROLOGY

## 2024-09-16 PROCEDURE — 1036F TOBACCO NON-USER: CPT | Performed by: PSYCHIATRY & NEUROLOGY

## 2024-09-16 RX ORDER — CARBIDOPA AND LEVODOPA 25; 100 MG/1; MG/1
1 TABLET ORAL 3 TIMES DAILY
Qty: 45 TABLET | Refills: 2 | Status: SHIPPED | OUTPATIENT
Start: 2024-09-16 | End: 2025-09-16

## 2024-09-16 ASSESSMENT — PAIN SCALES - GENERAL: PAINLEVEL: 0-NO PAIN

## 2024-09-16 NOTE — PROGRESS NOTES
Diagnoses/Problems     · Back pain, chronic (724.5,338.29) (M54.9,G89.29)   · Parkinson's disease (332.0) (G20)     Chief Complaint  PD.      History of Present Illness  Ms. 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     Interval hx:  tremor still under good control except when stressed and early in the morning. struggles with more stooping and low back pain (the latter is chronic from known lumbar disc disease). Doing online gambling and is getting more frequent since the last time we met. Staying up to 2 AM doing online gambling and only sleeps for four hours. Memory getting worse. Complains of food getting stuck in her chest with pain and frequent gurgling and burping. Will be seeing GI in January.           Review of Systems  All systems reviewed and are negative except as mentioned in the HPI.        Active Problems  Problems    · Abdominal bloating (787.3) (R14.0)   · Acute bacterial bronchitis (466.0,041.9) (J20.8,B96.89)   · Arthritis (716.90) (M19.90)   · Atrophic vaginitis (627.3) (N95.2)   · Back pain, chronic (724.5,338.29) (M54.9,G89.29)   · Borderline hyperlipidemia (272.4) (E78.5)   · Breast cancer screening by mammogram (V76.12) (Z12.31)   · Bronchiectasis (494.0) (J47.9)   · Chronic constipation (564.00) (K59.09)   · Chronic cough (786.2) (R05.3)   · Chronic GERD (530.81) (K21.9)   · Chronic low back pain (724.2,338.29) (M54.50,G89.29)   · Chronic sinusitis (473.9) (J32.9)   · Dr Boyd   · Colon cancer screening (V76.51) (Z12.11)   · Contusion of ribs, left, sequela (906.3) (S20.212S)   · Contusion, shoulder and upper arm, multiple sites, left, subsequent encounter  (V58.89,923.09) (S40.012D,S40.022D)   · CVI (common variable immunodeficiency) (279.06) (D83.9)   · Dysphagia (787.20) (R13.10)   · Encounter for Medicare annual wellness exam (V70.0) (Z00.00)   · Fibroid, uterine (218.9) (D25.9)   ·  GERD (gastroesophageal reflux disease) (530.81) (K21.9)   · Long term use of drug (V58.69) (Z79.899)   · MCTD (mixed connective tissue disease) (710.8) (M35.1)   · Medicare annual wellness visit, initial (V70.0) (Z00.00)   · Migraine without aura and without status migrainosus, not intractable (346.10) (G43.009)   · Other dysphagia (787.29) (R13.19)   · Overactive bladder (596.51) (N32.81)   · Overweight with body mass index (BMI) of 27 to 27.9 in adult (278.02,V85.23)  (E66.3,Z68.27)   · Palpitations (785.1) (R00.2)   · Parkinson's disease (332.0) (G20)   · Pulmonary nodules (793.19) (R91.8)   · Managed by Dr. Laguna,  Pulmonology   · Raynaud's syndrome (443.0) (I73.00)   · RLS (restless legs syndrome) (333.94) (G25.81)   · Scleroderma (710.1) (M34.9)   · Scoliosis (737.30) (M41.9)   · Senile osteoporosis (733.01) (M81.0)   · Somatic dysfunction of lumbosacral region (739.3) (M99.03)   · Somatic dysfunction of pelvic region (739.5) (M99.05)   · Strain of thoracic region, subsequent encounter (V58.89,847.1) (S29.019D)   · Systemic lupus erythematosus (710.0) (M32.9)   · Thoracic myofascial strain (847.1) (S29.019A)   · Vasomotor instability (780.2) (R55)   · Venous insufficiency (chronic) (peripheral) (459.81) (I87.2)   · Vitamin D deficiency (268.9) (E55.9)     Past Medical History  Problems    · 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 Napier   · History of Bilateral edema of lower extremity (782.3) (R60.0)   · Resolved Date: 19 Dec 2022   · History of COVID-19 virus infection (079.89) (U07.1)   · Resolved Date: 19 Dec 2022   · History of Dysphagia, idiopathic (787.20) (R13.10)   · History of asthma (V12.69) (Z87.09)   · Resolved Date: 05 Jan 2020   · History of hiatal hernia (V12.79) (Z87.19)   · Resolved Date: 26 Dec 2018   · History of hiatal hernia (V12.79) (Z87.19)   · Resolved Date: 10 Apr 2023   · History of hyperlipidemia (V12.29) (Z86.39)   · Resolved  Date: 26 Dec 2018   · History of hypothyroidism (V12.29) (Z86.39)   · Resolved Date: 26 Dec 2018   · History of malignant neoplasm of skin (V10.83) (Z85.828)   · right shoulder-Dr. Ott   · History of osteoarthritis (V13.4) (Z87.39)   · History of pneumonia due to Pseudomonas (V12.61) (Z87.01)   · Resolved Date: 22 Jun 2020   · History of pneumonia due to Pseudomonas (V12.61) (Z87.01)   · Resolved Date: 21 Jun 2022   · History of screening mammography (V15.89) (Z92.89)   · Mammo 2009   · History of Mycobacterium avium-intracellulare complex (031.2) (A31.0)   · Feb 2015, Dx by bronchoscopic culture, Dr. Whitten   · History of Pap smear for cervical cancer screening (V76.2) (Z12.4)   · PAP 8/2010   · History of Parkinsonism (332.0) (G20)   · Resolved Date: 21 Jun 2022   · History of Raynaud disease (443.0) (I73.00)     Surgical History  Problems    · History of Biopsy Pleural   · History of Complete Colonoscopy   · 2013   · History of Esophagogastric Fundoplasty Nissen Fundoplication   · 2018 June   · Denied: History of Hemorrhoidectomy By Rubber Band Ligation   · History of Nissen fundoplication laparoscopic   · 2019   · History of Right Breast Lumpectomy   fatty tumor tissue was removed ( 2 dense tissues areas in number)   · History of Shoulder Repair   · History of Sinus Surgery   · History of Tonsillectomy   · History of Treatment Of Jaw     Family History  Mother    · Family history of cerebrovascular accident (CVA) (V17.1) (Z82.3)  Father    · Family history of chronic obstructive pulmonary disease (V17.6) (Z82.5)  Brother    · Family history of malignant neoplasm of prostate (V16.42) (Z80.42)   Brother     Social History  Problems    · Employed   · teacher   ·    · Never a smoker   · No alcohol use   · No illicit drug use   · Occasional caffeine consumption     Allergies  Medication    · doxycycline   Adverse Reaction; burning sension; Dyspepsia; Recorded By: Moraima Bautista; 10/2/2020 11:14:14 AM   ·  Topamax   Fatigue; Nausea; Palpitations; Recorded By: Miguel Alvarado; 9/28/2020 11:53:17 PM  NonMedication    · No Known Food Allergies   Recorded By: Adrianna Vincent; 3/13/2018 9:29:13 AM        Physical Exam  GENERAL APPEARANCE: No distress, alert, interactive and cooperative.      CARDIOVASCULAR: Regular rate and rhythm. Pulses +2 and equal in all extremities.      MENTAL STATE:   Orientation was normal. Recent and remote memory was intact. Attention span and concentration were normal. Language testing was normal for comprehension and expression. General fund of knowledge was intact.      CRANIAL NERVES:   CN 2    Visual fields full to confrontation.   CN 3, 4, 6   Pupils round, 4 mm in diameter, equally reactive to light. Lids symmetric; no ptosis. EOMs normal alignment, full range with normal saccades and pursuit. No nystagmus.   CN 5   Facial sensation intact bilaterally.   CN 7   Normal and symmetric facial strength. Nasolabial folds symmetric.   CN 8   Hearing intact to voice.   CN 9/10  Palate elevates symmetrically.   CN 11   Normal strength of shoulder shrug.  CN 12   Tongue midline; no fasciculations.      MOTOR:   No noticeable rest tremor today on LUE or jaw, mild tremor RUE. No head tremor.  Mild fine postural tremor of RUE.   Tone is normal throughout.  Mild RUE>LUE bradykinesia of finger taps, hand movements  Normal toe and heel tapping bilat      R L  Deltoids 5 5  Biceps 4+ 4+  Triceps 4+ 4+  interossei 5 5  Finger ext 5 5  Handgrip 5 5     Hip Flex 5 5  Knee Flex 5 5  Knee Ext 5 5  Dorsiflex 5 5  Plantarflex 5 5     REFLEXES:    R L  BR: 2 2  Biceps: 3 3  Pec 2 2  Triceps: 2 2  Knee: 2 2  Ankle: 2 1     Plantars: R down, L down     SENSORY:   In both upper and lower extremities, sensation was intact to light touch     COORDINATION:   In both upper extremities, finger-nose-finger was intact without dysmetria or overshoot. Normal heel-to-shin.     GAIT:   Stands without pushing. Stooped with  anterocollis. Reduced R>L arm swing. Stride is fair. Negative Romberg. + retropulsion - unable to move feet; upper body fell toward writer needing to be caught      UPDRS Examination   Time of exam: 15:00. The patient is currently on medication. Medication given: Pramipexole.   Stimulator State: N/A   Speech: 0.5 Facial Expression: 0.5   Rest Tremor: Head: 0 RUE: 0.5 LUE: 0 RLE: 0 LLE: 0   Action Tremor: RUE: 0.5 LUE: 0   Rigidity: Neck: 0 RUE: 0 LUE: 0 RLE: 0 LLE: 0   Finger Taps: RUE: 1 LUE: 0.5   Hand Movements: RUE: 1 LUE: 0.5   Rapid Alternating Movements: RUE: 0 LUE: 0   Leg Agility: RLE: 0   Arising from chair: 0 Posture: 2 Gait: 1 Postural Stability: 2 Body Bradykinsia: 1   Deisy and Yahr Stage:     Provider Impressions     65 yo right handed female with hx of TMJ surgery 20 years ago, CVID on IVIG replacement, recurrent chest infection (MAC and pseudomonas), and MCTD on plaquenil who presents with 6-month hx of rest tremor of the jaw and both hands. Admitted to dream enactment, constipation, and poor balance. Her exam reveals very subtle parkinsonism with constant jaw rest tremor, intermittent rest tremor of BUE, very subtle bradykinesia on the right side, stooping, retropulsion, and decreased arm swing. Most likely idiopathic tremor predominant PD but given her significant PMH we will obtain a brain MRI WWO to rule out secondary parkinsonism. We will start her on a small dose of mirapex to see if it can help her tremor. Follow up after one month.      12/07/2020: MRI came back normal except for incidental pontine capillary telengectasia. she responded well to mirapex without side effects. exam slightly better.      06/07/2021: has some tremor in the morning and when nervous. Has achy legs at night that keeps her from sleeping. no side effects from mirapex. will increase dose to 0.5 mg and add a night time dose for RLS.      12/06/2021: has some tremor in the morning especially chin quivering. His RLS is  still bothersome and keeps her from sleeping. no side effects from mirapex but peripheral visual illusions are becoming more frequent. will increase night-time dose.      6/7/2022: Taking pramipexole 0.25mg TID then 1mg qHS (different from Rx'd in our system). Tremor only when anxious, tolerating pramipexole. Did start gambling after starting pramipexole but thinks it's under control. Lost total $300. No excessive shopping. Has peripheral visual illusions, stable, once a day. Exercise 3 times a week. Starting to golf. Sleep is very poor â€“ 1 to 2 hours per night with significant daytime sleepiness. Has orthostatic symptoms. will keep pramipexole at the same dose while carefully watching her online gambling habits (she will keep a diary of losses). will add remeron for insomnia and will instate mechanical measures for orthostatic hypotension.      12/06/2022: remeron helped her sleep but she stopped shortly after starting due to excessive weight gain. She has been taking one tablet of mirapex 0.5 mg in the morning and one tablet of mirapex 1 mg at night. Her tremor is under good control except when she first wakes up in the morning. she continues with online gambling but only plays free games and has not lost any money. she no longer has visual hallucinations. will adjust her mirapex dosing.     07/17/2023: tremor still under good control. struggles with more stooping and low back pain (the latter is chronic from known lumbar disc disease). Doing more online gambling lately (not free) but hasn't been losing money. balance is getting worse. had at least four recent falls during which she injured herself. wording finding difficulty is more prominent. will hold off on further mirapex increase and we may need to switch her to sinemet soon if she she starts losing money gambling. will refer her to PT and medical spine.    1/15/2024: tremor still under good control except when stressed. . struggles with more stooping and  low back pain (the latter is chronic from known lumbar disc disease). Doing online gambling (not free) but hasn't been losing money. Word finding difficulty getting worse. Exercising. Will keep mirapex the same.     9/16/2024: tremor still under good control except when stressed and early in the morning. struggles with more stooping and low back pain (the latter is chronic from known lumbar disc disease). Doing online gambling and is getting more frequent since the last time we met. Staying up to 2 AM doing online gambling and only sleeps for four hours. Memory getting worse. Complains of food getting stuck in her chest with pain and frequent gurgling and burping. Will be seeing GI in January. Will start sinemet and plan to wean her off mirapex after that to avoid worsening of impulse control disorder. Will refer her for a swallow evaluation.      PLAN:  I will start you on carbidopa/levodopa 25/100 mg: take half a tablet three times daily for one week at 8 AM, 12 PM, and 4 PM then increase to one full tablet after that if no side effects.     If you have good response and tolerability to carbidopa/levodopa, I will plan to wean you off pramipexole at a later date.     I will refer you to a speech therapist for a swallow evaluation.     Please continue fast paced exercise.     I agree with GI consultation.     Please try to cut down on online gambling.     Follow up after 2 to 3 months.      The impression and plan were discussed with the patient and their family (if present) in detail and all questions answered. They agreed to the plan as outlined above.     I spent 20 minutes with this patient. Greater than 50% of this time was spent in counseling and/or coordination of care.     Candido Will MD, PhD   of Neurology  [employer]  Director of Neuroimmunology and staff neurologist at the [facility]  [facility]  Center

## 2024-09-16 NOTE — PATIENT INSTRUCTIONS
I will start you on carbidopa/levodopa 25/100 mg: take half a tablet three times daily for one week at 8 AM, 12 PM, and 4 PM then increase to one full tablet after that if no side effects.     If you have good response and tolerability to carbidopa/levodopa, I will plan to wean you off pramipexole at a later date.     I will refer you to a speech therapist for a swallow evaluation.     Please continue fast paced exercise.     I agree with GI consultation.     Please try to cut down on online gambling.     Follow up after 2 to 3 months.     Thank you for visiting the clinic today    Candido Will MD

## 2024-09-25 ENCOUNTER — PROCEDURE VISIT (OUTPATIENT)
Age: 69
End: 2024-09-25
Payer: MEDICARE

## 2024-09-25 VITALS
BODY MASS INDEX: 25.83 KG/M2 | HEIGHT: 65 IN | WEIGHT: 155 LBS | SYSTOLIC BLOOD PRESSURE: 124 MMHG | DIASTOLIC BLOOD PRESSURE: 62 MMHG | OXYGEN SATURATION: 99 % | TEMPERATURE: 97.5 F | HEART RATE: 61 BPM

## 2024-09-25 DIAGNOSIS — M54.16 LUMBAR RADICULOPATHY: ICD-10-CM

## 2024-09-25 DIAGNOSIS — M48.061 LUMBAR FORAMINAL STENOSIS: ICD-10-CM

## 2024-09-25 LAB
ACID FAST STN SPEC: NORMAL
MYCOBACTERIUM SPEC CULT: NORMAL

## 2024-09-25 PROCEDURE — 64483 NJX AA&/STRD TFRM EPI L/S 1: CPT | Performed by: PAIN MEDICINE

## 2024-09-25 RX ORDER — SODIUM CHLORIDE 9 MG/ML
1 INJECTION, SOLUTION INTRAMUSCULAR; INTRAVENOUS; SUBCUTANEOUS ONCE
Status: COMPLETED | OUTPATIENT
Start: 2024-09-25 | End: 2024-09-25

## 2024-09-25 RX ORDER — DEXAMETHASONE SODIUM PHOSPHATE 10 MG/ML
10 INJECTION, SOLUTION INTRAMUSCULAR; INTRAVENOUS ONCE
Status: COMPLETED | OUTPATIENT
Start: 2024-09-25 | End: 2024-09-25

## 2024-09-25 RX ORDER — LIDOCAINE HYDROCHLORIDE 10 MG/ML
1 INJECTION, SOLUTION EPIDURAL; INFILTRATION; INTRACAUDAL; PERINEURAL ONCE
Status: COMPLETED | OUTPATIENT
Start: 2024-09-25 | End: 2024-09-25

## 2024-09-25 RX ADMIN — LIDOCAINE HYDROCHLORIDE 1 ML: 10 INJECTION, SOLUTION EPIDURAL; INFILTRATION; INTRACAUDAL; PERINEURAL at 13:38

## 2024-09-25 RX ADMIN — SODIUM CHLORIDE 1 ML: 9 INJECTION, SOLUTION INTRAMUSCULAR; INTRAVENOUS; SUBCUTANEOUS at 13:38

## 2024-09-25 RX ADMIN — DEXAMETHASONE SODIUM PHOSPHATE 10 MG: 10 INJECTION, SOLUTION INTRAMUSCULAR; INTRAVENOUS at 13:37

## 2024-09-26 NOTE — PROGRESS NOTES
Subjective   Patient ID: [id number]   Oneyda Parmar is a 68 y.o. female with MCTD? OP, OA, vitamin D def, Raynaud's, GERD, CVID, DL, venous insufficiency, chronic sinusitis, depression, migraine, scoliosis, RLS, Parkinson's, bronchiectasis, and pulmonary nodules, who presents or follow up     Current rheum meds:  -  mg every day (being tapered down)    Previous rheum meds:  -  mg daily, used to be BID stopped due to interaction with her antibiotics     HPI   Saw ID for chronic bronchiectasis and chronic lung colonization and/or infection in the setting of CVID -> Cipro 750 twice daily and clarithromycin 500 daily for 6 weeks, hold nifedipine and Plaquenil  Saw GI for worsening GERD -> PPI  Cough did not get better with antibiotics, did another sputum culture, negative 3 AFB sputum cultures negative   Saw pulm Dr. Duenas, ordered a CT chest, PFTs and started inhalers  Was on Ozempic for weight loss, then stopped due to cost  Saw ID again, treated her for sinusitis   Saw pain med, SI joint injections, oked by ID  Saw neurology for PD, started on Sinemet, referred to speech therapy     The patient is doing well  No morning stiffness, no painful joints or swelling   Compliant with meds  No side effects reported  No episodes of eye inflammation  No sicca sx  No chest pain, cough or dyspnea  No rashes  No infections    ROS:  As per HPI     Rheum hx:  Her previous rheumatologist Dr. Coto moved to [facility]  Diagnosed as MCTD a 5 years ago due to Raynaud's, KISHA 1:40, can't remember other sx at that time  Cold triggers it, fingers and toes turn white then purple then red, painful, lasts at least half an hour  Nifedipine doesn't help  She reports no improvement with the meds she has been on for the past 5 years  She has a current lung infection with 2 different organisms including pseudomonas, she was referred to ID, will be seeing Dr. Alex soon. She is currently on inhaled tobramycin   She also had a  mycobacterial infection previously and was treated with antibiotics for 2 years   Bilateral ankle pain and swelling, left more than the right, recently started  Reports mechanical back pain  Hand pains, with trigger fingers     + dry mouth, uses cough drops  + dysphagia reports is related to her Parkinson's disease  + dyspnea and cough from her pneumonia     PSH: Breast lumpectomy, gastric fundoplication, sinus surgery, tonsillectomy, shoulder repair   Allergies: Per EMR   Habits: No tobacco, no alcohol, no drugs   Social hx: , no kids, retired,    FHx: No family history of autoimmune diseases     Patient Active Problem List   Diagnosis    Abdominal bloating    Arthritis    Atrophic vaginitis    Back pain, chronic    Chronic low back pain    Bilateral edema of lower extremity    Borderline hyperlipidemia    Bronchiectasis with acute exacerbation (Multi)    Cervical somatic dysfunction    Chronic constipation    Chronic sinusitis    Contusion of ribs, left, sequela    Chronic cough    Contusion, shoulder and upper arm, multiple sites, left, subsequent encounter    CVID (common variable immunodeficiency) (Multi)    Fibroid, uterine    Gastroesophageal reflux disease    Dysphagia    Hernia, hiatal    MCTD (mixed connective tissue disease) (Multi)    Migraine without aura and without status migrainosus, not intractable    Overactive bladder    Palpitations    Pulmonary nodules    Raynaud's syndrome    RLS (restless legs syndrome)    Scleroderma (Multi)    Scoliosis    Senile osteoporosis    Somatic dysfunction of lumbosacral region    Somatic dysfunction of pelvic region    Strain of thoracic region    Systemic lupus erythematosus (Multi)    Thoracic myofascial strain    Vasomotor instability    Venous insufficiency (chronic) (peripheral)    Vitamin D deficiency    Medicare annual wellness visit, subsequent    Parkinson's disease (Multi)    Major depressive disorder, recurrent, mild (CMS-HCC)     Major depressive disorder, recurrent, moderate (Multi)    Major depressive disorder, recurrent, unspecified (CMS-HCC)    Abnormal CT of the chest    Atypical mycobacterial disease    Postural kyphosis of cervicothoracic region      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)     Anti-pneumococcal polysaccharide antibody deficiency    COVID-19 09/21/2022    COVID-19 virus infection    COVID-19 virus infection 06/21/2023    Dysphagia, unspecified     Dysphagia, idiopathic    Encounter for screening for malignant neoplasm of cervix     Pap smear for cervical cancer screening    Localized edema 05/04/2022    Bilateral edema of lower extremity    Parkinson's disease (Multi) 06/21/2022    Parkinsonism    Personal history of other diseases of the digestive system 07/18/2018    History of hiatal hernia    Personal history of other diseases of the musculoskeletal system and connective tissue     History of osteoarthritis    Personal history of other diseases of the respiratory system 10/08/2017    History of asthma    Personal history of other endocrine, nutritional and metabolic disease 08/08/2016    History of hyperlipidemia    Personal history of other endocrine, nutritional and metabolic disease 10/08/2017    History of hypothyroidism    Personal history of other malignant neoplasm of skin     History of malignant neoplasm of skin    Personal history of other medical treatment     History of screening mammography    Personal history of pneumonia (recurrent) 03/18/2020    History of pneumonia due to Pseudomonas    Personal history of pneumonia (recurrent) 01/29/2021    History of pneumonia due to Pseudomonas    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    Raynaud's syndrome without gangrene     Raynaud disease     Past Surgical History:   Procedure Laterality Date    BREAST LUMPECTOMY   06/23/2017    Right Breast Lumpectomy    COLONOSCOPY  07/29/2015    Complete Colonoscopy    GASTRIC FUNDOPLICATION  07/19/2018    Esophagogastric Fundoplasty Nissen Fundoplication    OTHER SURGICAL HISTORY  02/19/2015    Treatment Of Jaw    OTHER SURGICAL HISTORY  06/06/2019    Nissen fundoplication laparoscopic    OTHER SURGICAL HISTORY  06/23/2017    Biopsy Pleural    OTHER SURGICAL HISTORY  06/23/2017    Shoulder Repair    SINUS SURGERY  06/23/2017    Sinus Surgery    TONSILLECTOMY  06/23/2017    Tonsillectomy     Social History     Socioeconomic History    Marital status:      Spouse name: Not on file    Number of children: Not on file    Years of education: Not on file    Highest education level: Not on file   Occupational History    Not on file   Tobacco Use    Smoking status: Never     Passive exposure: Never    Smokeless tobacco: Never   Vaping Use    Vaping status: Never Used   Substance and Sexual Activity    Alcohol use: Not Currently    Drug use: Never    Sexual activity: Not on file   Other Topics Concern    Not on file   Social History Narrative    Not on file     Social Determinants of Health     Financial Resource Strain: Not on file   Food Insecurity: Not on file   Transportation Needs: Not on file   Physical Activity: Not on file   Stress: Not on file   Social Connections: Not on file   Intimate Partner Violence: Not on file   Housing Stability: Not on file      Allergies   Allergen Reactions    Doxycycline Other and Unknown    Prednisone Unknown     Red and hot when taking prednisone    Topiramate Other, Nausea Only, Palpitations and Unknown     Current Outpatient Medications:     albuterol 90 mcg/actuation inhaler, Inhale 2 puffs every 4 hours if needed., Disp: , Rfl:     carbidopa-levodopa (Sinemet)  mg tablet, Take 1 tablet by mouth 3 times a day. Start with half a tablet three times daily for one week then go to one full tablet after that., Disp: 45 tablet, Rfl: 2    ciprofloxacin  (Cipro) 500 mg tablet, Take 1.5 tablets (750 mg) by mouth 2 times a day., Disp: , Rfl:     clarithromycin XL (Biaxin XL) 500 mg 24 hr tablet, Take 2 tablets (1,000 mg) by mouth once daily. Do not crush, chew, or split., Disp: , Rfl:     ibuprofen 600 mg tablet, Take 1 tablet (600 mg) by mouth every 8 hours if needed for mild pain (1 - 3)., Disp: 90 tablet, Rfl: 5    immune globulin, human, (Gammagard Liquid) infusion, Infuse monthly as directed, Disp: , Rfl:     omeprazole (PriLOSEC) 40 mg DR capsule, Take 1 capsule (40 mg) by mouth once daily. Do not crush or chew., Disp: 30 capsule, Rfl: 3    propranolol LA (Inderal LA) 80 mg 24 hr capsule, Take 1 capsule (80 mg) by mouth once daily., Disp: 90 capsule, Rfl: 2    rizatriptan (Maxalt) 10 mg tablet, One po at onset of migraine; can repeat in 2 hrs;  max 20mg daily, Disp: 27 tablet, Rfl: 2    sulfaSALAzine (Azulfidine) 500 mg tablet, Take 2 tablets (1,000 mg) by mouth once daily. 2 tabs once a day, Disp: 180 tablet, Rfl: 1    tiotropium (Spiriva with HandiHaler) 18 mcg inhalation capsule, Place 1 capsule (18 mcg) into inhaler and inhale once daily., Disp: 30 capsule, Rfl: 11    tobramycin (Ashkan 300) 300 mg/5 mL nebulizer solution, Take 5 mL (300 mg) by nebulization 2 times a day., Disp: , Rfl:      Objective   Visit Vitals  OB Status Postmenopausal   Smoking Status Never     Physical Exam:  General: AAOx3, Cooperative  Head: normocephalic, atraumatic, no hair loss   Eyes: EOMI, conjunctiva clear, sclera white, anicteric  Ears: hearing intact  Nose: no deformity, no crusting   Throat/Mouth: No oral deformities, no cheek swelling, mucosa appear moist, no oral ulcers noted or loss of dentition   Neck/Lymph: FROM, trachea midline  Lungs: chest expansion symmetric, clear to auscultation bilaterally. +wheezing, no rhonchi, or stridor  Heart: S1, S2, RRR. No murmur or rub  Abdomen: Soft, non-tender without masses  Skin: No rashes, ulcers or photosensitive areas  MSK: Upper  Extremities:  Hand/Fingers: TTP of most PIPs and DIPs. Heberden's and [place]'s nodes. No erythema, edema, tenderness or warmth at DIP, PIP, or MCP joints, FROM grossly. Good hand . No nodules  Wrists: No erythema, edema, warmth or tenderness at wrist, FROM grossly  Elbows: No tenderness, edema, erythema or warmth at elbows, FROM grossly. No nodules   Shoulders: No edema, erythema, tenderness or warmth at shoulders. FROM  Lower Extremities:   Hips: No obvious deformities. No joint tenderness, normal ROM grossly. No trochanteric bursae TTP  Knees: No tenderness, deformities, edema, rashes, or warmth, normal ROM grossly. No crepitus, no pes anserine bursa TTP   Ankles: Bilateral ankle swelling, L>R, TTP around the malleoli, pitting edema with reffish discoloration.  Spine: No spinal tenderness to palpation. No SI joint tenderness    Lab Results   Component Value Date    WBC 6.0 05/29/2024    HGB 12.4 05/29/2024    HCT 37.6 05/29/2024    MCV 98 05/29/2024     05/29/2024        Chemistry    Lab Results   Component Value Date/Time     05/29/2024 1502    K 4.2 05/29/2024 1502     05/29/2024 1502    CO2 28 05/29/2024 1502    BUN 13 05/29/2024 1502    CREATININE 0.72 05/29/2024 1502    Lab Results   Component Value Date/Time    CALCIUM 9.8 05/29/2024 1502    ALKPHOS 101 05/29/2024 1502    AST 24 05/29/2024 1502    ALT 17 05/29/2024 1502    BILITOT 0.6 05/29/2024 1502        Lab Results   Component Value Date    CRP <0.10 05/29/2024      Lab Results   Component Value Date    KISHA Positive (A) 05/29/2024    RF <10 05/29/2024    SEDRATE 5 05/29/2024      Lab Results   Component Value Date    NEUTROABS 3.35 05/29/2024     Lab Results   Component Value Date    ALT 17 05/29/2024    AST 24 05/29/2024    ALKPHOS 101 05/29/2024    BILITOT 0.6 05/29/2024     Lab Results   Component Value Date    CALCIUM 9.8 05/29/2024     Spirometry Pre/Post Bronchodilator  The FEV1/FVC (0.71) is normal. The FEV1 (1.72L/83%) is  normal. The FVC is normal. Following administration of bronchodilators, there is no significant response.   Conclusion: Normal spirometry.     === 04/06/24 ===  XR CHEST 1 VIEW  Left lower lobe infiltrate.      === 02/12/24 ===  DEXA BONE DENSITY: Normal      Assessment/Plan    This is a 68 y.o. female with MCTD? OP, OA, vitamin D def, Raynaud's, GERD, CVID,Parkinson's, bronchiectasis, and pulmonary nodules, who presents for follow up  Last seen in 5/24    The patient has a previous dx of MCTD with Dr. Coto, reports having Raynaud's since [facility], but was diagnosed with MCTD around 5 years ago. She has been on SSZ and HCQ, without any improvement. She was off HCQ for 2 months (misunderstood her pulm when starting amlodipine and she was already on nifedipine, thought she was supposed to stop the HCQ, not nifedipine). Reports no worsening with being off of it for 2 months. Patient has RP and dry mouth, no other sx suggestive of CTD, no IA, no synovitis on exam. She has pitting edema in both ankles. Work up only positive for KISHA, pt also has thyroid disease. Will taper her off the meds and see    Labs:  5/24: CBC, CMP, ESR, CRP, C3, C4, UA, Uprt wnl. Vitamin D 26  KISHA 1:320, EKATERINA, RF, CCP neg  4/24: WBC 4K, ALC 1.2, rest of CBC, CMP wnl  2/24: ESR, CRP wnl  3/22: ESR, CBC wnl  ANCAs, KISHA 1:40, no EKATERINA panel    # KISHA positive  # On SSZ  # CVID  # GERD   # Bronchiectasis  # Vitamin D def    - Continue vitamin D  - Taper off SSZ  - Keep off HCQ    RTC     Plan, including risks and benefits, was discussed with the patient, informed on how to reach us.     To schedule an appointment, call [phone]  To reach the rheumatology office, call [phone]    Carly Metzger MD      Division of Rheumatology  [facility]

## 2024-09-27 ENCOUNTER — EVALUATION (OUTPATIENT)
Dept: SPEECH THERAPY | Facility: CLINIC | Age: 69
End: 2024-09-27
Payer: MEDICARE

## 2024-09-27 DIAGNOSIS — R13.10 DYSPHAGIA, UNSPECIFIED TYPE: Primary | ICD-10-CM

## 2024-09-27 DIAGNOSIS — G20.A1 PARKINSON'S DISEASE WITHOUT DYSKINESIA OR FLUCTUATING MANIFESTATIONS: ICD-10-CM

## 2024-09-27 PROCEDURE — 92610 EVALUATE SWALLOWING FUNCTION: CPT | Mod: GN | Performed by: SPEECH-LANGUAGE PATHOLOGIST

## 2024-09-27 ASSESSMENT — PAIN - FUNCTIONAL ASSESSMENT: PAIN_FUNCTIONAL_ASSESSMENT: 0-10

## 2024-09-27 ASSESSMENT — PAIN SCALES - GENERAL: PAINLEVEL_OUTOF10: 8

## 2024-09-27 NOTE — PROGRESS NOTES
"Speech-Language Pathology    SLP Adult Outpatient Speech-Language Pathology Clinical Swallow Evaluation    Patient Name: Oneyda Parmar  MRN: [id number]  Today's Date: 9/27/2024      Time Calculation  Start Time: 1650  Stop Time: 1730  Time Calculation (min): 40 min      Current Problem:   1. Dysphagia, unspecified type        2. Parkinson's disease without dyskinesia or fluctuating manifestations (Multi)  Referral to Speech Therapy            Recommendations:  Risk for Aspiration: No  Additional Recommendations: Modified barium swallow study, Speech Language Cognition Evaluation  Solid Diet Recommendations : Regular (IDDSI Level 7)  Liquid Diet Recommendations: Thin (IDDSI Level 0)  Compensatory Swallowing Strategies: Upright 90 degrees as possible for all oral intake, Remain upright for 20-30 minutes after meals, Alternate solids and liquids, Small bites/sips, Eat/feed slowly, Other (Comment) (Reflux precautions)  Medication Administration Recommendations: Whole, With Liquid, Other (Comment) (or as best tolerated)  Follow up treatments: Other (Comment) (per MBSS)      Assessment:  Assessment Results:     Patient presents with suspected functional oropharyngeal swallow and concern for esophageal dysphagia upon completion of clinical swallow evaluation at bedside this date. Examination of oral mechanism was unremarkable. Patient \"passed\" Binford Swallow Protocol (3 oz water challenge). Patient tolerated PO trials of ice chips, thin liquids via cup/straw, pureed solids, soft solids, and regular solids absent of overt s/s of aspiration. Of note, patient did report \"stuck\" sensation in chest that resolved when belching, and intermittent \"gurgling\" noise swallowing the swallow.    Per this assessment, patient is appropriate to continue baseline diet with standard aspiration and reflux precautions. Discussed with patient completion of MBSS to further assess for oropharyngeal vs esophageal dysphagia, and obtain imaging to " "assist with management. Patient is agreeable to proceed with MBSS. Also discussed completion speech/language/cognitive evaluation, as patient has noted difficulty with word-finding recently. Will continue to consider for evaluation following MBSS.    Prognosis: Excellent  Treatment Provided: No  Strengths: Cognition, Motivation  Barriers: None      Plan:  Inpatient/Swing Bed or Outpatient: Outpatient  Treatment/Interventions: Complete MBSS  SLP Plan: Skilled SLP  SLP Frequency: Other (Comment) (per MBSS)  Duration: 90 days  SLP Discharge Recommendations: Other (Comment) (per MBSS)  Next Treatment Priority: MBSS  Discussed POC: Patient  Discussed Risks/Benefits: Yes, Patient  Patient/Caregiver Agreeable: Yes    Goals (established 9/27/24):  Patient will complete modified barium swallow study (MBSS) for objective assessment of oropharyngeal swallow function, to assess for aspiration, and to guide further recommendations and treatment plan.      Subjective     Patient is a 68 year-old female with PMHx most significant for hiatal hernia and GERD s/p Nissen fundoplication, and recent diagnosis of Parkinson's disease, presenting to [facility] this date for swallowing evaluation upon referral from neurologist. Upon presentation this date, patient reporting \"It's not the actual swallowing, it's when it gets stuck and hurts until I burp\". Patient reports that this occurs frequently with bread, French fries, and soda. Patient experiences this pain when eating and late at night, and is doubled over in pain at times. Patient reporting these episodes may last up to 45 minutes. Patient also notes a loud gurgling that is frequent and occurs also outside of PO intake.    Patient denies difficulty swallowing solids or liquids, but does note that she has a hard time getting some pills down and they do tend to get stuck lower as well.    Patient reports history of Botox injection to esophagus as well as esophageal dilation. She is " "currently taking Omeprazole. She has a follow-up with GI scheduled for 10/31/24.    General Visit Information:  Patient Class: Outpatient  Living Environment: Home  Arrival: Independent  Ordering Physician: Candido Will MD  Reason for Referral: Swallowing evaluation, dysphagia, Parkinson's diagnosis  Past Medical History Relevant to Rehab: Hiatal hernia, GERD, Parkinson's disease  Prior Level of Function: WFL  Patient Seen During This Visit: Yes  Certification Period Start Date: 09/27/24  Certification Period End Date: 12/26/24  Total Number of Visits : 1  Date of Order: 09/16/24  [employer] Diet: Regular solids and thin liquids  Current Diet : Regular solids and thin liquids  Dysphagia Diagnosis: Other (Comment) (concern for esophageal dysphagia)      Objective       Baseline Assessment:  Respiratory Status: Room air  History of Intubation: No  Behavior/Cognition: Alert, Cooperative, Pleasant mood  Vision: Functional for self-feeding  Patient Positioning: Upright in Chair  Baseline Vocal Quality: Normal  Volitional Cough: Strong  Volitional Swallow: Within Functional Limits    Relevant Imaging:    CT Chest 7/15/24: \"There is a large hiatal hernia. Esophagus appears within normal  limits as seen.\"    Pain:  Pain Assessment  Pain Assessment: 0-10  0-10 (Numeric) Pain Score: 8  Pain Type: Chronic pain  Pain Location: Back  Pain Orientation: Lower    Oral/Motor Assessment:  Oral Hygiene: Good  Dentition: Adequate/Natural  Oral Motor: Within Functional Limits  Apraxia: None present  Intelligibility: Intelligible  Breath Support: Adequate for speech      Consistencies Trialed:  Consistencies Trialed: Yes  Consistencies Trialed: Thin (IDDSI Level 0) - Cup, Thin (IDDSI Level 0) - Straw, Pureed/extremely thick (IDDSI Level 4), Soft & bite sized/chopped (IDDSI Level 6), Regular (IDDSI Level 7)      Clinical Observations:  Patient Positioning: Upright in Chair  Management of Oral Secretions: Adequate  Was The 3 oz Swallow " "Protocol Completed: Yes  Other Signs/Symptoms of Difficulty with Feeding: Reported Globus Sensation, Other (Comment) (\"gurgling\" with need to belch)      SLP Outcome Measures:  EAT 10  My swallowing problem has caused me to lose weight.: 0  My swallowing problem interferes with my ability to go out for meals.: 0  Swallowing liquids takes extra effort.: 0  Swallowing solids takes extra effort.: 0  Swallowing pills takes extra effort.: 3  Swallowing is painful: 2  The pleasure of eating is affected by my swallowing.: 2  When I swallow food sticks in my throat.: 0  I cough when I eat.: 4  Swallowing is stressful: 2  EAT-10 TOTAL SCORE:: 13      Reflux Symptom Index  Hoarseness or a problem with your voice.: 5  Clearing your throat.: 5  Excess throat or mucous postnasal drip.: 5  Difficulty swallowing food, liquids or pills.: 3  Coughing after eating or after lying down.: 5  Breathing difficulties or choking episodes.: 2  Troublesome or annoying cough.: 5  Sensations of something sticking in your throat.: 3  Heartburn, chest pain, indigestion, or stomach acid coming up.: 5  Reflux Symptom Index Total Score: 38       Outpatient Education:  Adult Outpatient Education  Individual(s) Educated: Patient  Written Education : Modified barium swallow study handout  Verbal Education : Results and recommendations per this assessment  Patient/Caregiver Demonstrated Understanding: yes  Plan of Care Discussed and Agreed Upon: yes  Patient Response to Education: Patient/Caregiver Verbalized Understanding of Information        "

## 2024-09-30 ENCOUNTER — TELEPHONE (OUTPATIENT)
Dept: GASTROENTEROLOGY | Facility: CLINIC | Age: 69
End: 2024-09-30
Payer: MEDICARE

## 2024-09-30 NOTE — TELEPHONE ENCOUNTER
Pt left detailed voicemail stating she had an esophageal dysphagia episode on Saturday. Pt stated food got stuck while swallowing and she was doubled over in excruciating pain and vomiting white foam and gastric acid and juices w/o relief for over 60 minutes. Pt stated her  called 911 and they refused to come. Pt stated that 911 told her they needed approval from Dr Rosario to treat the patient in anyway due to her hiatal hernia growing. Pt stated she is still vomiting and in pain. Pt has follow up on October 31 but would like to be seen immediately because she is afraid to eat.    Would you like me to schedule her for a virtual today?

## 2024-10-01 ENCOUNTER — OFFICE VISIT (OUTPATIENT)
Dept: GASTROENTEROLOGY | Facility: CLINIC | Age: 69
End: 2024-10-01
Payer: MEDICARE

## 2024-10-01 VITALS
SYSTOLIC BLOOD PRESSURE: 147 MMHG | HEIGHT: 66 IN | WEIGHT: 154.6 LBS | HEART RATE: 108 BPM | DIASTOLIC BLOOD PRESSURE: 85 MMHG | BODY MASS INDEX: 24.85 KG/M2

## 2024-10-01 DIAGNOSIS — R13.19 ESOPHAGEAL DYSPHAGIA: Primary | ICD-10-CM

## 2024-10-01 DIAGNOSIS — G20.A1 PARKINSON'S DISEASE WITHOUT DYSKINESIA OR FLUCTUATING MANIFESTATIONS: Primary | ICD-10-CM

## 2024-10-01 PROCEDURE — 1159F MED LIST DOCD IN RCRD: CPT | Performed by: INTERNAL MEDICINE

## 2024-10-01 PROCEDURE — 3008F BODY MASS INDEX DOCD: CPT | Performed by: INTERNAL MEDICINE

## 2024-10-01 PROCEDURE — 99214 OFFICE O/P EST MOD 30 MIN: CPT | Performed by: INTERNAL MEDICINE

## 2024-10-01 PROCEDURE — 1157F ADVNC CARE PLAN IN RCRD: CPT | Performed by: INTERNAL MEDICINE

## 2024-10-01 RX ORDER — PRAMIPEXOLE DIHYDROCHLORIDE 0.5 MG/1
0.5 TABLET ORAL 3 TIMES DAILY
COMMUNITY
Start: 2024-09-25

## 2024-10-01 NOTE — H&P (VIEW-ONLY)
Subjective     History of Present Illness:   Oneyda Parmar is a 68 y.o. female who presents to GI clinic for folloew-up  dysphagia  .    She has history of Nissen fundoplication and has had problems with recurrent dysphagia after the Nissen.  She has had multiple EGDs with dilation and Botox injection.  Last time I did an EGD with dilation and Botox was in June 2023.    Continues to have problems with dysphagia  Almost all the time   Last Saturday she thought she almost had a food impaction with potatoes    Also parkinsons diagnosed recently within the last 1-2 years  Neurology seeing her and thinks it is progressing  She was seen by [facility] therapist and has a MBS ordered for suspicion of oropharyngeal and esophageal dysphagia    I had treated her for candida related to multiple antibiotics use         Review of Systems  Review of Systems   Constitutional: Negative.    Respiratory: Negative.     Cardiovascular: Negative.    Musculoskeletal: Negative.    Skin: Negative.        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), COVID-19 (09/21/2022), COVID-19 virus infection (06/21/2023), Dysphagia, unspecified, Encounter for screening for malignant neoplasm of cervix, Localized edema (05/04/2022), Parkinson's disease (Multi) (06/21/2022), Personal history of other diseases of the digestive system (07/18/2018), Personal history of other diseases of the musculoskeletal system and connective tissue, Personal history of other diseases of the respiratory system (10/08/2017), Personal history of other endocrine, nutritional and metabolic disease (08/08/2016), Personal history of other endocrine, nutritional and metabolic disease (10/08/2017), Personal history of other malignant neoplasm of skin, Personal history of other medical treatment, Personal history of pneumonia (recurrent) (03/18/2020), Personal history of pneumonia  "(recurrent) (01/29/2021), Pulmonary mycobacterial infection (Multi), and Raynaud's syndrome without gangrene.     Social History   reports that she has never smoked. She has never been exposed to tobacco smoke. She has never used smokeless tobacco. She reports that she does not currently use alcohol. She reports that she does not use drugs.     Family History  family history includes COPD in her father; Prostate cancer in her brother; cerebrovascular accident in her mother.     Allergies  Allergies   Allergen Reactions    Doxycycline Other and Unknown    Prednisone Unknown     Red and hot when taking prednisone    Topiramate Other, Nausea Only, Palpitations and Unknown       Medications  Current Outpatient Medications   Medication Instructions    albuterol 90 mcg/actuation inhaler 2 puffs, inhalation, Every 4 hours PRN    carbidopa-levodopa (Sinemet)  mg tablet 1 tablet, oral, 3 times daily, Start with half a tablet three times daily for one week then go to one full tablet after that.    ciprofloxacin (CIPRO) 750 mg, oral, 2 times daily    clarithromycin XL (BIAXIN XL) 1,000 mg, oral, Daily, Do not crush, chew, or split.    ibuprofen 600 mg, oral, Every 8 hours PRN    immune globulin, human, (Gammagard Liquid) infusion Infuse monthly as directed    omeprazole (PRILOSEC) 40 mg, oral, Daily, Do not crush or chew.    pramipexole (MIRAPEX) 0.5 mg, oral, 3 times daily    propranolol LA (INDERAL LA) 80 mg, oral, Daily    rizatriptan (Maxalt) 10 mg tablet One po at onset of migraine; can repeat in 2 hrs;  max 20mg daily    sulfaSALAzine (AZULFIDINE) 1,000 mg, oral, Daily, 2 tabs once a day    tiotropium (SPIRIVA WITH HANDIHALER) 18 mcg, inhalation, Daily RT    tobramycin (Ashkan 300) 300 mg/5 mL nebulizer solution 300 mg, nebulization, 2 times daily RT       Objective   /85 (BP Location: Right arm, Patient Position: Sitting)   Pulse 108   Ht 1.664 m (5' 5.5\")   Wt 70.1 kg (154 lb 9.6 oz)   BMI 25.34 kg/m² "   Physical Exam  Vitals reviewed.   Constitutional:       Appearance: Normal appearance.   Cardiovascular:      Rate and Rhythm: Normal rate and regular rhythm.      Pulses: Normal pulses.   Pulmonary:      Effort: Pulmonary effort is normal.      Breath sounds: Normal breath sounds.   Abdominal:      General: Abdomen is flat. Bowel sounds are normal. There is no distension.      Palpations: Abdomen is soft.      Tenderness: There is no abdominal tenderness.   Musculoskeletal:         General: Normal range of motion.   Neurological:      Mental Status: She is alert.                 Assessment/Plan   Oneyda Parmar is a 68 y.o. female who presents to GI clinic for persistent dysphagia.  History of Nissen fundoplication with recurrent dysphagia  No clear abnormalities seen     Will do an EGD with endoflip for further evaluation  Will also consider empiric dilation with Savory or Barnett dilators    May need to send her back to her surgeon for consideration of reversal of Nissen since all her problems have been ongoing since           Bobby Rosario MD

## 2024-10-01 NOTE — PROGRESS NOTES
Subjective     History of Present Illness:   Oneyda Parmar is a 68 y.o. female who presents to GI clinic for folloew-up  dysphagia  .    She has history of Nissen fundoplication and has had problems with recurrent dysphagia after the Nissen.  She has had multiple EGDs with dilation and Botox injection.  Last time I did an EGD with dilation and Botox was in June 2023.    Continues to have problems with dysphagia  Almost all the time   Last Saturday she thought she almost had a food impaction with potatoes    Also parkinsons diagnosed recently within the last 1-2 years  Neurology seeing her and thinks it is progressing  She was seen by [facility] therapist and has a MBS ordered for suspicion of oropharyngeal and esophageal dysphagia    I had treated her for candida related to multiple antibiotics use         Review of Systems  Review of Systems   Constitutional: Negative.    Respiratory: Negative.     Cardiovascular: Negative.    Musculoskeletal: Negative.    Skin: Negative.        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), COVID-19 (09/21/2022), COVID-19 virus infection (06/21/2023), Dysphagia, unspecified, Encounter for screening for malignant neoplasm of cervix, Localized edema (05/04/2022), Parkinson's disease (Multi) (06/21/2022), Personal history of other diseases of the digestive system (07/18/2018), Personal history of other diseases of the musculoskeletal system and connective tissue, Personal history of other diseases of the respiratory system (10/08/2017), Personal history of other endocrine, nutritional and metabolic disease (08/08/2016), Personal history of other endocrine, nutritional and metabolic disease (10/08/2017), Personal history of other malignant neoplasm of skin, Personal history of other medical treatment, Personal history of pneumonia (recurrent) (03/18/2020), Personal history of pneumonia  "(recurrent) (01/29/2021), Pulmonary mycobacterial infection (Multi), and Raynaud's syndrome without gangrene.     Social History   reports that she has never smoked. She has never been exposed to tobacco smoke. She has never used smokeless tobacco. She reports that she does not currently use alcohol. She reports that she does not use drugs.     Family History  family history includes COPD in her father; Prostate cancer in her brother; cerebrovascular accident in her mother.     Allergies  Allergies   Allergen Reactions    Doxycycline Other and Unknown    Prednisone Unknown     Red and hot when taking prednisone    Topiramate Other, Nausea Only, Palpitations and Unknown       Medications  Current Outpatient Medications   Medication Instructions    albuterol 90 mcg/actuation inhaler 2 puffs, inhalation, Every 4 hours PRN    carbidopa-levodopa (Sinemet)  mg tablet 1 tablet, oral, 3 times daily, Start with half a tablet three times daily for one week then go to one full tablet after that.    ciprofloxacin (CIPRO) 750 mg, oral, 2 times daily    clarithromycin XL (BIAXIN XL) 1,000 mg, oral, Daily, Do not crush, chew, or split.    ibuprofen 600 mg, oral, Every 8 hours PRN    immune globulin, human, (Gammagard Liquid) infusion Infuse monthly as directed    omeprazole (PRILOSEC) 40 mg, oral, Daily, Do not crush or chew.    pramipexole (MIRAPEX) 0.5 mg, oral, 3 times daily    propranolol LA (INDERAL LA) 80 mg, oral, Daily    rizatriptan (Maxalt) 10 mg tablet One po at onset of migraine; can repeat in 2 hrs;  max 20mg daily    sulfaSALAzine (AZULFIDINE) 1,000 mg, oral, Daily, 2 tabs once a day    tiotropium (SPIRIVA WITH HANDIHALER) 18 mcg, inhalation, Daily RT    tobramycin (Ashkan 300) 300 mg/5 mL nebulizer solution 300 mg, nebulization, 2 times daily RT       Objective   /85 (BP Location: Right arm, Patient Position: Sitting)   Pulse 108   Ht 1.664 m (5' 5.5\")   Wt 70.1 kg (154 lb 9.6 oz)   BMI 25.34 kg/m² "   Physical Exam  Vitals reviewed.   Constitutional:       Appearance: Normal appearance.   Cardiovascular:      Rate and Rhythm: Normal rate and regular rhythm.      Pulses: Normal pulses.   Pulmonary:      Effort: Pulmonary effort is normal.      Breath sounds: Normal breath sounds.   Abdominal:      General: Abdomen is flat. Bowel sounds are normal. There is no distension.      Palpations: Abdomen is soft.      Tenderness: There is no abdominal tenderness.   Musculoskeletal:         General: Normal range of motion.   Neurological:      Mental Status: She is alert.                 Assessment/Plan   Oneyda Parmar is a 68 y.o. female who presents to GI clinic for persistent dysphagia.  History of Nissen fundoplication with recurrent dysphagia  No clear abnormalities seen     Will do an EGD with endoflip for further evaluation  Will also consider empiric dilation with Savory or Barnett dilators    May need to send her back to her surgeon for consideration of reversal of Nissen since all her problems have been ongoing since           Bobby Rosario MD

## 2024-10-02 LAB
ACID FAST STN SPEC: NORMAL
MYCOBACTERIUM SPEC CULT: NORMAL

## 2024-10-03 ENCOUNTER — HOSPITAL ENCOUNTER (OUTPATIENT)
Dept: RADIOLOGY | Facility: HOSPITAL | Age: 69
Discharge: HOME | End: 2024-10-03
Payer: MEDICARE

## 2024-10-03 DIAGNOSIS — R13.10 DYSPHAGIA, UNSPECIFIED TYPE: Primary | ICD-10-CM

## 2024-10-03 DIAGNOSIS — G20.A1 PARKINSON'S DISEASE WITHOUT DYSKINESIA OR FLUCTUATING MANIFESTATIONS: ICD-10-CM

## 2024-10-03 PROCEDURE — 2500000005 HC RX 250 GENERAL PHARMACY W/O HCPCS: Performed by: PSYCHIATRY & NEUROLOGY

## 2024-10-03 PROCEDURE — 92611 MOTION FLUOROSCOPY/SWALLOW: CPT | Mod: GN | Performed by: SPEECH-LANGUAGE PATHOLOGIST

## 2024-10-03 PROCEDURE — 74230 X-RAY XM SWLNG FUNCJ C+: CPT

## 2024-10-03 NOTE — PROCEDURES
"Speech-Language Pathology    Outpatient Modified Barium Swallow Study    Patient Name: Oneyda Parmar  MRN: [id number]  : 1955  Today's Date: 10/03/24     Time Calculation  Start Time: 1300  Stop Time: 1330  Time Calculation (min): 30 min   Modified Barium Swallow Study completed. Informed verbal consent obtained prior to completion of exam. The study was completed per protocol with various liquid barium consistencies, pudding, solids and a 13mm barium tablet. A 1.9 cm or .75 inch (outer diameter) ring was placed on the chin in the lateral view and on the lateral, left side of the neck in the a-p view in order to complete objective measurements during swallowing. The anatomic structures and function of the oropharynx, larynx, hypopharynx and cervical esophagus were evaluated.    SLP: ALLYSSA CARIAS CCC-SLP, SUGEY AVERY S-SLP   Contact info: [employer]; phone: [phone]      Reason for Referral:   Patient Hx: Patient is a 68 year-old female with PMHx most significant for hiatal hernia and GERD s/p Nissen fundoplication, and recent diagnosis of Parkinson's disease, presenting to [facility] this date for MBSS upon recommendation from speech pathologist. During previous swallow evaluation, \"patient reporting \"It's not the actual swallowing, it's when it gets stuck and hurts until I burp\". Patient reported this frequently occurring with bread, French fries, and soda. Patient experiences this pain when eating and late at night, and is doubled over in pain at times. Patient reported these episodes may last up to 45 minutes. Patient also notes a loud gurgling that is frequent and occurs also outside of PO intake.\"     Patient reported drinking chocolate milk and began coughing and unexpectedly experienced an episode of emesis this date. She also noted that her gastroenterologist is going to do an EGD, and she may potentially need to see her surgeon pending the results of this.     Patient denies difficulty " swallowing solids or liquids, but does note that she does have difficulty after she has swallowed. She also has a hard time getting some pills down and they do tend to get stuck lower as well. She reported coughing often when she eats and that overall swallowing is becoming more and more stressful for her.     Respiratory Status: Room air  Current diet: Regular solids and thin liquids    Pain:  Pain Scale: 0-10  Ratin    FINAL SPEECH RECOMMENDATIONS    DIET:   DIET RECOMMENDATIONS: - Regular (IDDSI Level 7)  - Thin liquids (IDDSI Level 0)  Per the results of today's MBSS, patient to continue baseline diet of regular consistencies and thin liquids following swallow strategies listed below:    STRATEGIES:  - Small bites  - Small, single sips  - Reflux Precautions  - Complete oral care frequently throughout the day  -Several small meals/snacks throughout the day    SLP PLAN:  Skilled SLP Services: No further skilled SLP intervention is warranted for dysphagia at this time. Consider cognitive evaluation due to concerns expressed during prior evaluation (2024).      Education Provided: Results and recommendations per MBSS, with video review. Verbal understanding and agreement given on all accounts.     Additional Medical Consults Suggested:   - Continue to follow up with GI as planned      Mechanics of the Swallow Summary:  ORAL PHASE:  Lip Closure - No labial escape/anterior loss of bolus   Tongue Control During Bolus Hold - Cohesive bolus between tongue to palatal seal   Bolus prep/mastication - Timely and efficient mastication skills   Bolus transport/lingual motion - Brisk tongue motion for A-P movement of the bolus   Oral residue - Trace residue lining oral structures     PHARYNGEAL PHASE:  Initiation of pharyngeal swallow - Bolus head at posterior angle of ramus   Soft palate elevation - No bolus between soft palate/pharyngeal wall   Laryngeal elevation - Complete superior movement of thyroid cartilage  with contact of arytenoids to epiglottic petiole   Anterior hyoid excursion - Complete anterior movement   Epiglottic movement - Complete inversion    Laryngeal vestibule closure - Complete - no air/contrast in laryngeal vestibule   Pharyngeal stripping wave - Complete  Pharyngeal contraction (A/P view) - Complete  Pharyngoesophageal segment opening - Complete distension and complete duration/no obstruction of flow of bolus   Tongue base retraction - Trace column of contrast or air between tongue base and pharyngeal wall   Pharyngeal residue - Trace residue within or on the pharyngeal structures     ESOPHAGEAL PHASE:  Esophageal clearance - Esophageal retention with retrograde flow below the pharyngoesophageal segment       SLP Impressions with Severity Rating:   Pt presents with functional oropharyngeal swallow and moderate to severe esophageal dysphagia upon completion of modified barium swallow study this date. Swallowing physiology is detailed above. Impairments most impacting swallowing safety and efficiency include decreased tongue base retraction.  Patient did not demonstrate penetration or aspiration for any consistency. Patient demonstrated trace oral and pharyngeal residue that was reduced with repeat swallows.    In the A-P view, patient demonstrated minimal esophageal clearance of contrast with retrograde flow below the pharyngoesophageal segment. Of note: The A-P bolus follow-through is not intended to be utilized as a diagnostic assessment of the esophagus, rather a tool to observe the biomechanical aspects of the swallow continuum and to inform the need for further evaluation by medical specialists, as applicable.     Per the results of this assessment, patient is appropriate to continue baseline diet, with additional recommendations as noted above. No further skilled ST services are warranted per this assessment. Recommend continuing with GI follow-up as planned.     OUTCOME MEASURES:  Functional  Oral Intake Scale  Functional Oral Intake Scale: Level 7        total oral diet with no restrictions       Eating Assessment Tool (EAT-10)  0=No problem, 1=Mild problem, 2=Mild to moderate problem, 3=Moderate problem, 4=Severe problem    EAT 10  My swallowing problem has caused me to lose weight.: 0  My swallowing problem interferes with my ability to go out for meals.: 2  Swallowing liquids takes extra effort.: 2  Swallowing solids takes extra effort.: 0  Swallowing pills takes extra effort.: 4  Swallowing is painful: 2  The pleasure of eating is affected by my swallowing.: 1  When I swallow food sticks in my throat.: 0  I cough when I eat.: 3  Swallowing is stressful: 4  EAT-10 TOTAL SCORE:: 18    A total score of 3 or above may indicate difficulty with swallowing safely and/or efficiently      Rosenbek's Penetration Aspiration Scale  Thin Liquids: 1. NO ASPIRATION & NO PENETRATION - no aspiration, contrast does not enter airway  [place] Thick Liquids: 1. NO ASPIRATION & NO PENETRATION - no aspiration, contrast does not enter airway  [place] Thick Liquids: 1. NO ASPIRATION & NO PENETRATION - no aspiration, contrast does not enter airway  Puree: 1. NO ASPIRATION & NO PENETRATION - no aspiration, contrast does not enter airway  Solids: 1. NO ASPIRATION & NO PENETRATION - no aspiration, contrast does not enter airway

## 2024-10-05 DIAGNOSIS — K21.9 GASTROESOPHAGEAL REFLUX DISEASE WITHOUT ESOPHAGITIS: ICD-10-CM

## 2024-10-07 ENCOUNTER — ANESTHESIA (OUTPATIENT)
Dept: GASTROENTEROLOGY | Facility: HOSPITAL | Age: 69
End: 2024-10-07
Payer: MEDICARE

## 2024-10-07 ENCOUNTER — HOSPITAL ENCOUNTER (OUTPATIENT)
Dept: GASTROENTEROLOGY | Facility: HOSPITAL | Age: 69
Setting detail: OUTPATIENT SURGERY
Discharge: HOME | End: 2024-10-07
Payer: MEDICARE

## 2024-10-07 ENCOUNTER — APPOINTMENT (OUTPATIENT)
Dept: RHEUMATOLOGY | Facility: CLINIC | Age: 69
End: 2024-10-07
Payer: MEDICARE

## 2024-10-07 ENCOUNTER — ANESTHESIA EVENT (OUTPATIENT)
Dept: GASTROENTEROLOGY | Facility: HOSPITAL | Age: 69
End: 2024-10-07
Payer: MEDICARE

## 2024-10-07 VITALS
SYSTOLIC BLOOD PRESSURE: 158 MMHG | OXYGEN SATURATION: 99 % | DIASTOLIC BLOOD PRESSURE: 80 MMHG | HEART RATE: 70 BPM | RESPIRATION RATE: 16 BRPM | TEMPERATURE: 97.3 F

## 2024-10-07 DIAGNOSIS — D83.9 CVID (COMMON VARIABLE IMMUNODEFICIENCY): ICD-10-CM

## 2024-10-07 DIAGNOSIS — R76.8 POSITIVE ANA (ANTINUCLEAR ANTIBODY): ICD-10-CM

## 2024-10-07 DIAGNOSIS — E55.9 VITAMIN D DEFICIENCY: Primary | ICD-10-CM

## 2024-10-07 DIAGNOSIS — R13.10 DYSPHAGIA: ICD-10-CM

## 2024-10-07 DIAGNOSIS — J47.1 BRONCHIECTASIS WITH ACUTE EXACERBATION (MULTI): ICD-10-CM

## 2024-10-07 DIAGNOSIS — R13.19 ESOPHAGEAL DYSPHAGIA: Primary | ICD-10-CM

## 2024-10-07 PROBLEM — Z98.890 PONV (POSTOPERATIVE NAUSEA AND VOMITING): Status: ACTIVE | Noted: 2024-10-07

## 2024-10-07 PROBLEM — R11.2 PONV (POSTOPERATIVE NAUSEA AND VOMITING): Status: ACTIVE | Noted: 2024-10-07

## 2024-10-07 PROCEDURE — A43233 PR EGD ESOPHAGUS BALLOON DILATION 30 MM OR LARGER: Performed by: ANESTHESIOLOGIST ASSISTANT

## 2024-10-07 PROCEDURE — 7100000010 HC PHASE TWO TIME - EACH INCREMENTAL 1 MINUTE

## 2024-10-07 PROCEDURE — 7100000009 HC PHASE TWO TIME - INITIAL BASE CHARGE

## 2024-10-07 PROCEDURE — 2500000001 HC RX 250 WO HCPCS SELF ADMINISTERED DRUGS (ALT 637 FOR MEDICARE OP): Performed by: ANESTHESIOLOGY

## 2024-10-07 PROCEDURE — 3700000001 HC GENERAL ANESTHESIA TIME - INITIAL BASE CHARGE

## 2024-10-07 PROCEDURE — C1769 GUIDE WIRE: HCPCS

## 2024-10-07 PROCEDURE — 2720000007 HC OR 272 NO HCPCS

## 2024-10-07 PROCEDURE — 43450 DILATE ESOPHAGUS 1/MULT PASS: CPT | Performed by: INTERNAL MEDICINE

## 2024-10-07 PROCEDURE — A43233 PR EGD ESOPHAGUS BALLOON DILATION 30 MM OR LARGER: Performed by: ANESTHESIOLOGY

## 2024-10-07 PROCEDURE — 3700000002 HC GENERAL ANESTHESIA TIME - EACH INCREMENTAL 1 MINUTE

## 2024-10-07 PROCEDURE — 91040 ESOPH BALLOON DISTENSION TST: CPT | Performed by: INTERNAL MEDICINE

## 2024-10-07 PROCEDURE — 2500000004 HC RX 250 GENERAL PHARMACY W/ HCPCS (ALT 636 FOR OP/ED): Performed by: ANESTHESIOLOGY

## 2024-10-07 PROCEDURE — 2500000004 HC RX 250 GENERAL PHARMACY W/ HCPCS (ALT 636 FOR OP/ED): Performed by: ANESTHESIOLOGIST ASSISTANT

## 2024-10-07 RX ORDER — PROPOFOL 10 MG/ML
INJECTION, EMULSION INTRAVENOUS AS NEEDED
Status: DISCONTINUED | OUTPATIENT
Start: 2024-10-07 | End: 2024-10-07

## 2024-10-07 RX ORDER — SUMATRIPTAN 50 MG/1
50 TABLET, FILM COATED ORAL ONCE
Status: COMPLETED | OUTPATIENT
Start: 2024-10-07 | End: 2024-10-07

## 2024-10-07 RX ORDER — OMEPRAZOLE 40 MG/1
40 CAPSULE, DELAYED RELEASE ORAL DAILY
Qty: 30 CAPSULE | Refills: 3 | Status: SHIPPED | OUTPATIENT
Start: 2024-10-07

## 2024-10-07 RX ORDER — LIDOCAINE HCL/PF 100 MG/5ML
SYRINGE (ML) INTRAVENOUS AS NEEDED
Status: DISCONTINUED | OUTPATIENT
Start: 2024-10-07 | End: 2024-10-07

## 2024-10-07 SDOH — HEALTH STABILITY: MENTAL HEALTH: CURRENT SMOKER: 0

## 2024-10-07 ASSESSMENT — PAIN SCALES - GENERAL
PAINLEVEL_OUTOF10: 6
PAINLEVEL_OUTOF10: 7

## 2024-10-07 ASSESSMENT — COLUMBIA-SUICIDE SEVERITY RATING SCALE - C-SSRS
1. IN THE PAST MONTH, HAVE YOU WISHED YOU WERE DEAD OR WISHED YOU COULD GO TO SLEEP AND NOT WAKE UP?: NO
6. HAVE YOU EVER DONE ANYTHING, STARTED TO DO ANYTHING, OR PREPARED TO DO ANYTHING TO END YOUR LIFE?: NO
2. HAVE YOU ACTUALLY HAD ANY THOUGHTS OF KILLING YOURSELF?: NO

## 2024-10-07 ASSESSMENT — PAIN DESCRIPTION - DESCRIPTORS: DESCRIPTORS: HEADACHE;OTHER (COMMENT)

## 2024-10-07 ASSESSMENT — PAIN - FUNCTIONAL ASSESSMENT: PAIN_FUNCTIONAL_ASSESSMENT: 0-10

## 2024-10-07 NOTE — ANESTHESIA POSTPROCEDURE EVALUATION
Patient: Oneyda Parmar    Procedure Summary       Date: 10/07/24 Room / Location: [facility]    Anesthesia Start: 0925 Anesthesia Stop: 1008    Procedure: EGD Diagnosis: Dysphagia    Scheduled Providers: Bobby Rosario MD Responsible Provider: Nahid Lerma MD    Anesthesia Type: MAC ASA Status: 2            Anesthesia Type: MAC    Vitals Value Taken Time   /77 10/07/24 1006   Temp 36.3 10/07/24 1008   Pulse 88 10/07/24 1007   Resp 14 10/07/24 1008   SpO2 95 % 10/07/24 1007   Vitals shown include unfiled device data.    Anesthesia Post Evaluation    Patient location during evaluation: PACU  Patient participation: complete - patient participated  Level of consciousness: awake  Pain management: adequate  Airway patency: patent  Cardiovascular status: acceptable  Respiratory status: acceptable  Hydration status: acceptable  Postoperative Nausea and Vomiting: none      No notable events documented.

## 2024-10-07 NOTE — ANESTHESIA PREPROCEDURE EVALUATION
Patient: Oneyda Parmar    Procedure Information       Date/Time: 10/07/24 0900    Scheduled providers: Bobby Rosario MD    Procedure: EGD    Location: [facility]            Relevant Problems   Anesthesia   (+) PONV (postoperative nausea and vomiting)      Cardiac   (+) Borderline hyperlipidemia      Pulmonary   (+) Pulmonary nodules      Neuro   (+) Major depressive disorder, recurrent, mild (CMS-HCC)   (+) Major depressive disorder, recurrent, moderate   (+) Major depressive disorder, recurrent, unspecified      GI   (+) Dysphagia   (+) Gastroesophageal reflux disease   (+) Hernia, hiatal      Musculoskeletal   (+) Chronic low back pain   (+) Scoliosis      HEENT   (+) Chronic sinusitis      ID   (+) Atypical mycobacterial disease      GYN   (+) Fibroid, uterine       Clinical information reviewed:   Tobacco  Allergies  Meds   Med Hx  Surg Hx   Fam Hx          NPO Detail:  NPO/Void Status  Date of Last Liquid: 10/06/24  Time of Last Liquid: 2359  Date of Last Solid: 10/06/24  Time of Last Solid: 2345         Physical Exam    Airway  Mallampati: II  TM distance: >3 FB  Neck ROM: full     Cardiovascular - normal exam  Rhythm: regular  Rate: normal     Dental - normal exam     Pulmonary - normal exam     Abdominal            Anesthesia Plan    History of general anesthesia?: yes  History of complications of general anesthesia?: yes    ASA 2     MAC     The patient is not a current smoker.    intravenous induction   Anesthetic plan and risks discussed with patient.    Plan discussed with CAA.

## 2024-10-07 NOTE — POST-PROCEDURE NOTE
1040 pt continues to c/o migraine headache does not have home meds with hr-spoke with Leida in pharmacy-pts migraine med not in our formulary-sumatriptan  50-100mg may repeat in 2hrs prn-max dose 200mg pr [id number]-cwhejcuizu--pt states has taken sumatriptan with out diffiulty-helps-spoke with dr Archer to give 50mg sumatriptan po and if still has migraine in 2 hrs at home can take home med --Dr Abraham gonzalez to give  Pt is tolerating PO snack well.  Reviewed discharge instructions, educated pt  on anesthesia safety & renforced clear liquid diet with pt  All pre-op belongings with the pt.

## 2024-10-07 NOTE — DISCHARGE INSTRUCTIONS
Patient Instructions after an EGD      The anesthetics, sedatives or narcotics which were given to you today will be acting in your body for the next 24 hours, so you might feel a little sleepy or groggy.  This feeling should slowly wear off. Carefully read and follow the instructions.     You received sedation today:  - Do not drive or operate any machinery or power tools of any kind.   - No alcoholic beverages today, not even beer or wine.  - Do not make any important decisions or sign any legal documents.  - No over the counter medications that contain alcohol or that may cause drowsiness.  - Do not make any important decisions or sign any legal documents.    While it is common to experience mild to moderate abdominal distention, gas, or belching after your procedure, if any of these symptoms occur following discharge from the GI Lab or within one week of having your procedure, call the [facility] to be advised whether a visit to your nearest Urgent Care or Emergency Department is indicated.  Take this paper with you if you go.     - If you develop an allergic reaction to the medications that were given during your procedure such as difficulty breathing, rash, hives, severe nausea, vomiting or lightheadedness.  - If you experience chest pain, shortness of breath, severe abdominal pain, fevers and chills.  -If you develop signs and symptoms of bleeding such as blood in your spit, if your stools turn black, tarry, or bloody  - If you have not urinated within 8 hours following your procedure.  - If your IV site becomes painful, red, inflamed, or looks infected.    If you received a biopsy/polypectomy/sphincterotomy the following instructions apply below:    __ Do not use Aspirin containing products, non-steroidal medications or anti-coagulants for one week following your procedure. (Examples of these types of medications are: Advil, Arthrotec, Aleve, Coumadin, Ecotrin, Heparin, Ibuprofen, Indocin,  Motrin, Naprosyn, Nuprin, Plavix, Vioxx, and Voltarin, or their generic forms.  This list is not all-inclusive.  Check with your physician or pharmacist before resuming medications.)   __ Eat a soft diet today.  Avoid foods that are poorly digested for the next 24 hours.  These foods would include: nuts, beans, lettuce, red meats, and fried foods. Start with liquids and advance your diet as tolerated, gradually work up to eating solids.   __ Do not have a Barium Study or Enema for one week.    Your physician recommends the additional following instructions:    -You have a contact number available for emergencies. The signs and symptoms of potential delayed complications were discussed with you. You may return to normal activities tomorrow.  -Resume your previous diet.  -Continue your present medications.   -We are waiting for your pathology results.  -Your physician has recommended a repeat colonoscopy (date to be determined after pending pathology results are reviewed) for surveillance based on pathology results.  -The findings and recommendations have been discussed with you.  -The findings and recommendations were discussed with your family.  - Please see Medication Reconciliation Form for new medication/medications prescribed.     If you experience any problems or have any questions following discharge from the GI Lab, please call: [phone]

## 2024-10-09 ENCOUNTER — HOSPITAL ENCOUNTER (OUTPATIENT)
Dept: RADIOLOGY | Facility: HOSPITAL | Age: 69
Discharge: HOME | End: 2024-10-09
Payer: MEDICARE

## 2024-10-09 DIAGNOSIS — R13.19 ESOPHAGEAL DYSPHAGIA: ICD-10-CM

## 2024-10-09 LAB
ACID FAST STN SPEC: NORMAL
MYCOBACTERIUM SPEC CULT: NORMAL

## 2024-10-09 PROCEDURE — 74220 X-RAY XM ESOPHAGUS 1CNTRST: CPT

## 2024-10-09 PROCEDURE — A9698 NON-RAD CONTRAST MATERIALNOC: HCPCS | Performed by: INTERNAL MEDICINE

## 2024-10-09 PROCEDURE — 2500000005 HC RX 250 GENERAL PHARMACY W/O HCPCS: Performed by: INTERNAL MEDICINE

## 2024-10-09 PROCEDURE — 74220 X-RAY XM ESOPHAGUS 1CNTRST: CPT | Performed by: RADIOLOGY

## 2024-10-14 NOTE — PROGRESS NOTES
Subjective   Patient ID: [id number]   Oneyda Parmar is a 68 y.o. female with MCTD? OP, OA, vitamin D def, Raynaud's, GERD, CVID, DL, venous insufficiency, chronic sinusitis, depression, migraine, scoliosis, RLS, Parkinson's, bronchiectasis, and pulmonary nodules, who presents or follow up     Current rheum meds:  -  mg every day (being tapered down)    Previous rheum meds:  -  mg daily, used to be BID stopped due to interaction with her antibiotics     HPI   Saw ID for chronic bronchiectasis and chronic lung colonization and/or infection in the setting of CVID -> Cipro 750 twice daily and clarithromycin 500 daily for 6 weeks, hold nifedipine and Plaquenil  Saw GI for worsening GERD -> PPI  Cough did not get better with antibiotics, did another sputum culture, negative 3 AFB sputum cultures negative   Saw pulm Dr. Duenas, ordered a CT chest, PFTs and started inhalers  Was on Ozempic for weight loss, then stopped due to cost  Saw ID again, treated her for sinusitis   Saw pain med, SI joint injections, oked by ID  Saw neurology for PD, started on Sinemet, referred to speech therapy   Getting surgery for hiatal hernia, causing a lot of nasuea and cough   The patient is doing otherwise doing well  Reports some swelling and pain in her ankles, mainly when she wears shoes   No morning stiffness  Compliant with meds  No side effects reported  No episodes of eye inflammation  No sicca sx  No chest pain or dyspnea  No rashes    ROS:  As per HPI     Rheum hx:  Her previous rheumatologist Dr. Coto moved to [facility]  Diagnosed as MCTD a 5 years ago due to Raynaud's, KISHA 1:40, can't remember other sx at that time  Cold triggers it, fingers and toes turn white then purple then red, painful, lasts at least half an hour  Nifedipine doesn't help  She reports no improvement with the meds she has been on for the past 5 years  She has a current lung infection with 2 different organisms including pseudomonas, she was  referred to ID, will be seeing Dr. Alex soon. She is currently on inhaled tobramycin   She also had a mycobacterial infection previously and was treated with antibiotics for 2 years   Bilateral ankle pain and swelling, left more than the right, recently started  Reports mechanical back pain  Hand pains, with trigger fingers     + dry mouth, uses cough drops  + dysphagia reports is related to her Parkinson's disease  + dyspnea and cough from her pneumonia     PSH: Breast lumpectomy, gastric fundoplication, sinus surgery, tonsillectomy, shoulder repair   Allergies: Per EMR   Habits: No tobacco, no alcohol, no drugs   Social hx: , no kids, retired,    FHx: No family history of autoimmune diseases     Patient Active Problem List   Diagnosis    Abdominal bloating    Arthritis    Atrophic vaginitis    Back pain, chronic    Chronic low back pain    Bilateral edema of lower extremity    Borderline hyperlipidemia    Bronchiectasis with acute exacerbation (Multi)    Cervical somatic dysfunction    Chronic constipation    Chronic sinusitis    Contusion of ribs, left, sequela    Chronic cough    Contusion, shoulder and upper arm, multiple sites, left, subsequent encounter    CVID (common variable immunodeficiency)    Fibroid, uterine    Gastroesophageal reflux disease    Dysphagia    Hernia, hiatal    MCTD (mixed connective tissue disease) (Multi)    Migraine without aura and without status migrainosus, not intractable    Overactive bladder    Palpitations    Pulmonary nodules    Raynaud's syndrome    RLS (restless legs syndrome)    Scleroderma (Multi)    Scoliosis    Senile osteoporosis    Somatic dysfunction of lumbosacral region    Somatic dysfunction of pelvic region    Strain of thoracic region    Systemic lupus erythematosus (Multi)    Thoracic myofascial strain    Vasomotor instability    Venous insufficiency (chronic) (peripheral)    Vitamin D deficiency    Medicare annual wellness visit,  subsequent    Parkinson's disease (Multi)    Major depressive disorder, recurrent, mild (CMS-HCC)    Major depressive disorder, recurrent, moderate    Major depressive disorder, recurrent, unspecified    Abnormal CT of the chest    Atypical mycobacterial disease    Postural kyphosis of cervicothoracic region    PONV (postoperative nausea and vomiting)      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)     Anti-pneumococcal polysaccharide antibody deficiency    COVID-19 09/21/2022    COVID-19 virus infection    COVID-19 virus infection 06/21/2023    Dysphagia, unspecified     Dysphagia, idiopathic    Encounter for screening for malignant neoplasm of cervix     Pap smear for cervical cancer screening    Localized edema 05/04/2022    Bilateral edema of lower extremity    Parkinson's disease (Multi) 06/21/2022    Parkinsonism    Personal history of other diseases of the digestive system 07/18/2018    History of hiatal hernia    Personal history of other diseases of the musculoskeletal system and connective tissue     History of osteoarthritis    Personal history of other diseases of the respiratory system 10/08/2017    History of asthma    Personal history of other endocrine, nutritional and metabolic disease 08/08/2016    History of hyperlipidemia    Personal history of other endocrine, nutritional and metabolic disease 10/08/2017    History of hypothyroidism    Personal history of other malignant neoplasm of skin     History of malignant neoplasm of skin    Personal history of other medical treatment     History of screening mammography    Personal history of pneumonia (recurrent) 03/18/2020    History of pneumonia due to Pseudomonas    Personal history of pneumonia (recurrent) 01/29/2021    History of pneumonia due to Pseudomonas    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     Raynaud's syndrome without gangrene     Raynaud disease     Past Surgical History:   Procedure Laterality Date    BREAST LUMPECTOMY  06/23/2017    Right Breast Lumpectomy    COLONOSCOPY  07/29/2015    Complete Colonoscopy    GASTRIC FUNDOPLICATION  07/19/2018    Esophagogastric Fundoplasty Nissen Fundoplication    OTHER SURGICAL HISTORY  02/19/2015    Treatment Of Jaw    OTHER SURGICAL HISTORY  06/06/2019    Nissen fundoplication laparoscopic    OTHER SURGICAL HISTORY  06/23/2017    Biopsy Pleural    OTHER SURGICAL HISTORY  06/23/2017    Shoulder Repair    SINUS SURGERY  06/23/2017    Sinus Surgery    TONSILLECTOMY  06/23/2017    Tonsillectomy     Social History     Socioeconomic History    Marital status:      Spouse name: Not on file    Number of children: Not on file    Years of education: Not on file    Highest education level: Not on file   Occupational History    Not on file   Tobacco Use    Smoking status: Never     Passive exposure: Never    Smokeless tobacco: Never   Vaping Use    Vaping status: Never Used   Substance and Sexual Activity    Alcohol use: Not Currently    Drug use: Never    Sexual activity: Not on file   Other Topics Concern    Not on file   Social History Narrative    Not on file     Social Drivers of Health     Financial Resource Strain: Not on file   Food Insecurity: Not on file   Transportation Needs: Not on file   Physical Activity: Not on file   Stress: Not on file   Social Connections: Not on file   Intimate Partner Violence: Not on file   Housing Stability: Not on file      Allergies   Allergen Reactions    Doxycycline Other and Unknown    Prednisone Unknown     Red and hot when taking prednisone    Topiramate Other, Nausea Only, Palpitations and Unknown     Current Outpatient Medications:     albuterol 90 mcg/actuation inhaler, Inhale 2 puffs every 4 hours if needed., Disp: , Rfl:     carbidopa-levodopa (Sinemet)  mg tablet, Take 1 tablet by mouth 3 times a day. Start  with half a tablet three times daily for one week then go to one full tablet after that., Disp: 45 tablet, Rfl: 2    ibuprofen 600 mg tablet, Take 1 tablet (600 mg) by mouth every 8 hours if needed for mild pain (1 - 3)., Disp: 90 tablet, Rfl: 5    immune globulin, human, (Gammagard Liquid) infusion, Infuse monthly as directed, Disp: , Rfl:     omeprazole (PriLOSEC) 40 mg DR capsule, TAKE 1 CAPSULE (40 MG) BY MOUTH ONCE DAILY. DO NOT CRUSH OR CHEW., Disp: 30 capsule, Rfl: 3    pramipexole (Mirapex) 0.5 mg tablet, Take 1 tablet (0.5 mg) by mouth 3 times a day., Disp: , Rfl:     propranolol LA (Inderal LA) 80 mg 24 hr capsule, Take 1 capsule (80 mg) by mouth once daily., Disp: 90 capsule, Rfl: 2    rizatriptan (Maxalt) 10 mg tablet, One po at onset of migraine; can repeat in 2 hrs;  max 20mg daily, Disp: 27 tablet, Rfl: 2    sulfaSALAzine (Azulfidine) 500 mg tablet, Take 2 tablets (1,000 mg) by mouth once daily. 2 tabs once a day, Disp: 180 tablet, Rfl: 1    tiotropium (Spiriva with HandiHaler) 18 mcg inhalation capsule, Place 1 capsule (18 mcg) into inhaler and inhale once daily. (Patient not taking: Reported on 10/7/2024), Disp: 30 capsule, Rfl: 11     Objective   Visit Vitals  /76   Pulse 86   Resp 20   Wt 72.1 kg (159 lb)   BMI 26.06 kg/m²   OB Status Postmenopausal   Smoking Status Never   BSA 1.83 m²     Physical Exam:  General: AAOx3, Cooperative  Head: normocephalic, atraumatic, no hair loss   Eyes: EOMI, conjunctiva clear, sclera white, anicteric  Ears: hearing intact  Nose: no deformity, no crusting   Throat/Mouth: No oral deformities, no cheek swelling, mucosa appear moist, no oral ulcers noted or loss of dentition   Skin: No rashes, ulcers or photosensitive areas  MSK: Upper Extremities:  Hand/Fingers: Heberden's and [place]'s nodes. No erythema, edema, tenderness or warmth at DIP, PIP, or MCP joints, FROM grossly. Good hand . No nodules  Wrists: No erythema, edema, warmth or tenderness at  wrist, FROM grossly  Elbows: No tenderness, edema, erythema or warmth at elbows, FROM grossly. No nodules   Shoulders: No edema, erythema, tenderness or warmth at shoulders. FROM  Lower Extremities:   Hips: No obvious deformities. No joint tenderness, normal ROM grossly. No trochanteric bursae TTP  Knees: No tenderness, deformities, edema, rashes, or warmth, normal ROM grossly. No crepitus, no pes anserine bursa TTP   Ankles: Bilateral ankle swelling, L>R, TTP around the malleoli, pitting edema  Spine: No spinal tenderness to palpation. No SI joint tenderness    Lab Results   Component Value Date    WBC 4.9 10/17/2024    HGB 12.6 10/17/2024    HCT 38.2 10/17/2024    MCV 93 10/17/2024     10/17/2024        Chemistry    Lab Results   Component Value Date/Time     10/17/2024 1551    K 4.7 10/17/2024 1551     10/17/2024 1551    CO2 27 10/17/2024 1551    BUN 12 10/17/2024 1551    CREATININE 0.80 10/17/2024 1551    Lab Results   Component Value Date/Time    CALCIUM 9.4 10/17/2024 1551    ALKPHOS 113 10/17/2024 1551    AST 26 10/17/2024 1551    ALT 21 10/17/2024 1551    BILITOT 0.5 10/17/2024 1551        Lab Results   Component Value Date    CRP 0.14 10/17/2024      Lab Results   Component Value Date    KISHA Positive (A) 05/29/2024    RF <10 05/29/2024    SEDRATE 14 10/17/2024      Lab Results   Component Value Date    NEUTROABS 2.94 10/17/2024     Lab Results   Component Value Date    ALT 21 10/17/2024    AST 26 10/17/2024    ALKPHOS 113 10/17/2024    BILITOT 0.5 10/17/2024     Lab Results   Component Value Date    CALCIUM 9.4 10/17/2024     FL GI esophagram  Narrative: Interpreted By:  Leopoldo Candelaria,   STUDY:  FL GI ESOPHAGRAM;  10/9/2024 10:18 am      INDICATION:  Signs/Symptoms:evaluate for paraespohageal hernia versus diverticulum  at the LES.      ,R13.19 Other dysphagia      COMPARISON:  None.      ACCESSION NUMBER(S):  [id number]      ORDERING CLINICIAN:  PATEL MARROQUIN      TECHNIQUE:  No contrast  esophagram was performed.      FINDINGS:  While standing, the gastroesophageal junction is near the  diaphragmatic hiatus. There is mild diffuse esophageal dilatation.  There is narrowing at the gastroesophageal junction relating to  compression by the adjacent hernia. There is poor esophageal emptying  throughout the exam with intra esophageal reflux.      There is a moderate sized paraesophageal hernia. At maximal  distension, the intrathoracic portion of the stomach measures about  5.8 cm in cephalocaudal length by 8.7 cm in width. While recumbent,  the gastroesophageal junction is above the diaphragmatic hiatus which  could reflect a transient sliding component of the hernia.      At least mild esophageal dysmotility was suspected. Evaluating  motility was difficult due to esophageal retention of contrast  throughout the exam..      Spontaneous gastroesophageal reflux was also noted.          Impression: Moderate sized paraesophageal hernia. The intrathoracic portion of  the stomach, measuring nearly 9 cm in width, compressed the  gastroesophageal junction resulting in significant delay in  esophageal emptying with recurrent intraesophageal reflux throughout  the exam. There was also spontaneous gastroesophageal reflux while  recumbent.      MACRO:  None      Signed by: Leopoldo Candelaria 10/9/2024 2:05 PM  Dictation workstation:   [id number]     === 04/06/24 ===  XR CHEST 1 VIEW  Left lower lobe infiltrate.      === 02/12/24 ===  DEXA BONE DENSITY: Normal      Assessment/Plan    This is a 68 y.o. female with MCTD? OP, OA, vitamin D def, Raynaud's, GERD, CVID,Parkinson's, bronchiectasis, and pulmonary nodules, who presents for follow up  Last seen in 5/24    The patient has a previous dx of MCTD with Dr. Coto, reports having Raynaud's since [facility], but was diagnosed with MCTD around 5 years ago. She has been on SSZ and HCQ, without any improvement. She was off HCQ for 2 months (misunderstood her pulm when  starting amlodipine and she was already on nifedipine, thought she was supposed to stop the HCQ, not nifedipine). Reports no worsening with being off of it for 2 months. Patient has RP and dry mouth, no other sx suggestive of CTD, no IA, no synovitis on exam. She has pitting edema in both ankles. Work up only positive for KISHA, pt also has thyroid disease. Will taper her off the meds and see    Labs:  10/24: CBC, CMP, ESR, CRP, C3, C4 wnl  5/24: CBC, CMP, ESR, CRP, C3, C4, UA, Uprt wnl. Vitamin D 26  KISHA 1:320, EKATERINA, RF, CCP neg  4/24: WBC 4K, ALC 1.2, rest of CBC, CMP wnl  2/24: ESR, CRP wnl  3/22: ESR, CBC wnl  ANCAs, KISHA 1:40, no EKATERINA panel    # KISHA positive  # On SSZ  # CVID  # GERD   # Bronchiectasis  # Vitamin D def  # Hiatal hernia     - Continue vitamin D 1000 international units daily, refilled   - Discontinue SSZ  - Keep off HCQ  - Follow up with general surgery for hiatal hernia surgery  - Xray ankles today   - Blood tests before her next nagi     RTC in 6 months     Plan, including risks and benefits, was discussed with the patient, informed on how to reach us.     To schedule an appointment, call [phone]  To reach the rheumatology office, call [phone]    Carly Metzger MD      Division of Rheumatology  [facility]

## 2024-10-16 LAB
ACID FAST STN SPEC: NORMAL
MYCOBACTERIUM SPEC CULT: NORMAL

## 2024-10-17 ENCOUNTER — LAB (OUTPATIENT)
Dept: LAB | Facility: LAB | Age: 69
End: 2024-10-17
Payer: COMMERCIAL

## 2024-10-17 DIAGNOSIS — E55.9 VITAMIN D DEFICIENCY: ICD-10-CM

## 2024-10-17 DIAGNOSIS — Z79.899 ON SULFASALAZINE THERAPY: ICD-10-CM

## 2024-10-17 DIAGNOSIS — M35.1 MCTD (MIXED CONNECTIVE TISSUE DISEASE) (MULTI): ICD-10-CM

## 2024-10-17 LAB
ALBUMIN SERPL BCP-MCNC: 4.1 G/DL (ref 3.4–5)
ALP SERPL-CCNC: 113 U/L (ref 33–136)
ALT SERPL W P-5'-P-CCNC: 21 U/L (ref 7–45)
ANION GAP SERPL CALC-SCNC: 12 MMOL/L (ref 10–20)
AST SERPL W P-5'-P-CCNC: 26 U/L (ref 9–39)
BASOPHILS # BLD AUTO: 0.04 X10*3/UL (ref 0–0.1)
BASOPHILS NFR BLD AUTO: 0.8 %
BILIRUB SERPL-MCNC: 0.5 MG/DL (ref 0–1.2)
BUN SERPL-MCNC: 12 MG/DL (ref 6–23)
CALCIUM SERPL-MCNC: 9.4 MG/DL (ref 8.6–10.3)
CHLORIDE SERPL-SCNC: 104 MMOL/L (ref 98–107)
CO2 SERPL-SCNC: 27 MMOL/L (ref 21–32)
CREAT SERPL-MCNC: 0.8 MG/DL (ref 0.5–1.05)
CRP SERPL-MCNC: 0.14 MG/DL
EGFRCR SERPLBLD CKD-EPI 2021: 80 ML/MIN/1.73M*2
EOSINOPHIL # BLD AUTO: 0.11 X10*3/UL (ref 0–0.7)
EOSINOPHIL NFR BLD AUTO: 2.3 %
ERYTHROCYTE [DISTWIDTH] IN BLOOD BY AUTOMATED COUNT: 13.6 % (ref 11.5–14.5)
ERYTHROCYTE [SEDIMENTATION RATE] IN BLOOD BY WESTERGREN METHOD: 14 MM/H (ref 0–30)
GLUCOSE SERPL-MCNC: 87 MG/DL (ref 74–99)
HCT VFR BLD AUTO: 38.2 % (ref 36–46)
HGB BLD-MCNC: 12.6 G/DL (ref 12–16)
IMM GRANULOCYTES # BLD AUTO: 0.02 X10*3/UL (ref 0–0.7)
IMM GRANULOCYTES NFR BLD AUTO: 0.4 % (ref 0–0.9)
LYMPHOCYTES # BLD AUTO: 1.32 X10*3/UL (ref 1.2–4.8)
LYMPHOCYTES NFR BLD AUTO: 27.2 %
MCH RBC QN AUTO: 30.6 PG (ref 26–34)
MCHC RBC AUTO-ENTMCNC: 33 G/DL (ref 32–36)
MCV RBC AUTO: 93 FL (ref 80–100)
MONOCYTES # BLD AUTO: 0.42 X10*3/UL (ref 0.1–1)
MONOCYTES NFR BLD AUTO: 8.7 %
NEUTROPHILS # BLD AUTO: 2.94 X10*3/UL (ref 1.2–7.7)
NEUTROPHILS NFR BLD AUTO: 60.6 %
NRBC BLD-RTO: 0 /100 WBCS (ref 0–0)
PLATELET # BLD AUTO: 276 X10*3/UL (ref 150–450)
POTASSIUM SERPL-SCNC: 4.7 MMOL/L (ref 3.5–5.3)
PROT SERPL-MCNC: 6.8 G/DL (ref 6.4–8.2)
RBC # BLD AUTO: 4.12 X10*6/UL (ref 4–5.2)
SODIUM SERPL-SCNC: 138 MMOL/L (ref 136–145)
WBC # BLD AUTO: 4.9 X10*3/UL (ref 4.4–11.3)

## 2024-10-17 PROCEDURE — 86140 C-REACTIVE PROTEIN: CPT

## 2024-10-17 PROCEDURE — 36415 COLL VENOUS BLD VENIPUNCTURE: CPT

## 2024-10-17 PROCEDURE — 86160 COMPLEMENT ANTIGEN: CPT

## 2024-10-17 PROCEDURE — 85025 COMPLETE CBC W/AUTO DIFF WBC: CPT

## 2024-10-17 PROCEDURE — 85652 RBC SED RATE AUTOMATED: CPT

## 2024-10-17 PROCEDURE — 80053 COMPREHEN METABOLIC PANEL: CPT

## 2024-10-18 LAB
C3 SERPL-MCNC: 150 MG/DL (ref 87–200)
C4 SERPL-MCNC: 33 MG/DL (ref 10–50)

## 2024-10-18 NOTE — PROGRESS NOTES
REFLUX SURGERY NEW PATIENT CONSULTATION  HISTORY AND PHYSICAL    Date: 10/18/2024 Time: 10:17 AM    Name: Oneyda Parmar  MRN: [id number]    This is a 68 y.o. female with dysphagia, food sticking, vomiting, burping, gurgling. Dr. Luz did Nissen fundoplication about 6 years ago.   Has been following with Dr. Rosario and vomiting gastric juices and gurgling in her mouth.  Food stuck in her chest.  When bends over then brings up gastric juices.  Barium swallow shows recurrence of hiatal hernia with plication in the chest.  She is also noting some projectile vomiting  Esophagram/ugi reviewed  EGD reviewed    Symptoms: dysphagia, food sticking, vomiting, burping, gurgling.  Symptoms since at least one year, vomiting and burping for a couple months.  History of PPI use  GERD - Health Related Quality of Life Questionnaire (GERD- HRQL)    Provider Impression Bobby Rosario MD 10/1/24:  Oneyda Parmar is a 68 y.o. female who presents to GI clinic for follow-up  dysphagia  She has history of Nissen fundoplication and has had problems with recurrent dysphagia after the Nissen.  She has had multiple EGDs with dilation and Botox injection.  Last time I did an EGD with dilation and Botox was in June 2023.  Continues to have problems with dysphagia  Almost all the time   Last Saturday she thought she almost had a food impaction with potatoes  Also parkinsons diagnosed recently within the last 1-2 years  Neurology seeing her and thinks it is progressing  She was seen by speech therapist and has a MBS ordered for suspicion of oropharyngeal and esophageal dysphagia  I had treated her for candida related to multiple antibiotics use   Oneyda Parmar is a 68 y.o. female who presents to GI clinic for persistent dysphagia.  History of Nissen fundoplication with recurrent dysphagia  No clear abnormalities seen      Will do an EGD with endoflip for further evaluation  Will also consider empiric dilation with Savory or Barnett dilators    May  need to send her back to her surgeon for consideration of reversal of Nissen since all her problems have been ongoing since         On PPI    How bad is the heartburn? 5 = Symptoms are incapacitation to do daily activity  Heartburn when lying down? 5 = Symptoms are incapacitation to do daily activity  Heartburn when standing up? 5 = Symptoms are incapacitation to do daily activity  Heartburn after meals? 5 = Symptoms are incapacitation to do daily activity  Does heartburn change your diet? 5 = Symptoms are incapacitation to do daily activity  Does heartburn wake you from sleep? 5 = Symptoms are incapacitation to do daily activity  Do you have difficulty swallowing? 5 = Symptoms are incapacitation to do daily activity  Do you have pain with swallowing? 5 = Symptoms are incapacitation to do daily activity  If you take medication, does this affect your daily life? 4 = Symptoms affect daily activity  How bad is the regurgitation? 5 = Symptoms are incapacitation to do daily activity  Regurgitation when lying down? 5 = Symptoms are incapacitation to do daily activity  Regurgitation when standing up? 5 = Symptoms are incapacitation to do daily activity  Regurgitation after meals? 5 = Symptoms are incapacitation to do daily activity  Does regurgitation change your diet? 5 = Symptoms are incapacitation to do daily activity  Does regurgitation wake you from sleep? 5 = Symptoms are incapacitation to do daily activity  How satisfied are you with your present condition? Dissatisfied    Total score (calculated by summing the individual scores of questions 1-15): 75   Greatest possible score 75 (worst symptoms).   Lowest possible score 0 (no symptoms).    Heartburn score (calculated by summing the individual scores of questions 1-6): 30   Worst heartburn symptoms: 30.   No heartburn symptoms: 0.   Score less than or equal to 12 with each individual question not exceeding 2 indicate heartburn elimination.    Regurgitation score  (calculated by summing the individual scores of questions 10-15): 25   Worst regurgitation symptoms: 30.   No regurgitation symptoms: 0.   Score less than or equal to 12 with each individual question not exceeding 2 indicate regurgitation elimination.     EGD 10/7/24 Bobby Rosario MD:  Indication  Esophageal dysphagia  Impression  Large amount of fluid and food in the lower third of the esophagus, this was suctioned. Examination of the lower esophagus shows a large pouch containing food, this was located just right to the GE junction. ? Large diverticulum versus paraesophageal hernia    Previous fundoplication in the esophagus  The stomach appeared normal.  A single large diverticulum was seen in the second portion of the duodenum  Endoflip impedance planimetry showed a normal EGJ opening with EGJ-DI at 3.9 mm2/mmHg and maximum EGJ diameter at 16.7 mm. Contractility pattern was absent contractile response (ACR). Note that the DI was significantly abnormal at 40 and 50 ml but then was normal at 60 and 70 ml.   Dilated in the esophagus with Savary-Deirdre dilator to 60 Fr ending size. Dilation caused bleeding and mucosal tears; post-dilation bleeding was moderate; post-dilation mucosal tears were superficial, a tear was actually seen in the upper esophagus at 20 cm.   Findings  Food in the lower third of the esophagus  Abnormal previous fundoplication in the esophagus; no bleeding was identified  The stomach and duodenum appeared normal.  Dilated in the esophagus with Savary-Deirdre dilator from 54 Fr starting size to 60 Fr ending size. Dilation caused bleeding and mucosal tears; post-dilation bleeding was moderate; post-dilation mucosal tears were superficial  Recommendation  Observe clinical course after today's therapy  Clear liquid diet today   Will order a barium esophagram to completely characterize the diverticulum versus paraeosphageal hernia  Will discuss case in upcoming motility meeting and consider surgical  "referral                   Esophagram 10/9/24:   Show images for [place] GI esophagram   FINDINGS:  While standing, the gastroesophageal junction is near the  diaphragmatic hiatus. There is mild diffuse esophageal dilatation.  There is narrowing at the gastroesophageal junction relating to  compression by the adjacent hernia. There is poor esophageal emptying  throughout the exam with intra esophageal reflux.  There is a moderate sized paraesophageal hernia. At maximal  distension, the intrathoracic portion of the stomach measures about  5.8 cm in cephalocaudal length by 8.7 cm in width. While recumbent,  the gastroesophageal junction is above the diaphragmatic hiatus which  could reflect a transient sliding component of the hernia.  At least mild esophageal dysmotility was suspected. Evaluating  motility was difficult due to esophageal retention of contrast  throughout the exam..  Spontaneous gastroesophageal reflux was also noted.  IMPRESSION:  Moderate sized paraesophageal hernia. The intrathoracic portion of  the stomach, measuring nearly 9 cm in width, compressed the  gastroesophageal junction resulting in significant delay in  esophageal emptying with recurrent intraesophageal reflux throughout  the exam. There was also spontaneous gastroesophageal reflux while  recumbent.      Barium Swallow 10/4/24:   Show images for [place] modified barium swallow study   SPEECH FINDINGS:  Reason for Referral:  Patient Hx: Patient is a 68 year-old female with PMHx most  significant for hiatal hernia and GERD s/p Nissen fundoplication, and  recent diagnosis of Parkinson's disease, presenting to [facility] this  date for MBSS upon recommendation from speech pathologist. During  previous swallow evaluation, \"patient reporting \"It's not the actual  swallowing, it's when it gets stuck and hurts until I burp\". Patient  reported this frequently occurring with bread, French fries, and  soda. Patient experiences this pain when eating and late at " "night,  and is doubled over in pain at times. Patient reported these episodes  may last up to 45 minutes. Patient also notes a loud gurgling that is  frequent and occurs also outside of PO intake.\"      Patient reported drinking chocolate milk and began coughing and  unexpectedly experienced an episode of emesis this date. She also  noted that her gastroenterologist is going to do an EGD, and she may  potentially need to see her surgeon pending the results of this.      Patient denies difficulty swallowing solids or liquids, but does note  that she does have difficulty after she has swallowed. She also has a  hard time getting some pills down and they do tend to get stuck lower  as well. She reported coughing often when she eats and that overall  swallowing is becoming more and more stressful for her.      Respiratory Status: Room air  Current diet: Regular solids and thin liquids      Pain:  Pain Scale: 0-10  Ratin      FINAL SPEECH RECOMMENDATIONS      DIET:  DIET RECOMMENDATIONS: - Regular (IDDSI Level 7)  - Thin liquids (IDDSI Level 0)  Per the results of today's MBSS, patient to continue baseline diet of  regular consistencies and thin liquids following swallow strategies  listed below:      STRATEGIES:  - Small bites  - Small, single sips  - Reflux Precautions  - Complete oral care frequently throughout the day  -Several small meals/snacks throughout the day      SLP PLAN:  Skilled SLP Services: No further skilled SLP intervention is  warranted for dysphagia at this time. Consider cognitive evaluation  due to concerns expressed during prior evaluation (2024).      Education Provided: Results and recommendations per MBSS, with video  review. Verbal understanding and agreement given on all accounts.      Additional Medical Consults Suggested:  - Continue to follow up with GI as planned          Mechanics of the Swallow Summary:  ORAL PHASE:  Lip Closure - No labial escape/anterior loss of bolus  Tongue " Control During Bolus Hold - Cohesive bolus between tongue to  palatal seal Bolus prep/mastication - Timely and efficient  mastication skills Bolus transport/lingual motion - Brisk tongue  motion for A-P movement of the bolus Oral residue - Trace residue  lining oral structures      PHARYNGEAL PHASE:  Initiation of pharyngeal swallow - Bolus head at posterior angle of  ramus Soft palate elevation - No bolus between soft palate/pharyngeal  wall Laryngeal elevation - Complete superior movement of thyroid  cartilage with contact of arytenoids to epiglottic petiole Anterior  hyoid excursion - Complete anterior movement Epiglottic movement -  Complete inversion Laryngeal vestibule closure - Complete - no  air/contrast in laryngeal vestibule Pharyngeal stripping wave -  Complete Pharyngeal contraction (A/P view) - Complete  Pharyngoesophageal segment opening - Complete distension and complete  duration/no obstruction of flow of bolus Tongue base retraction -  Trace column of contrast or air between tongue base and pharyngeal  wall Pharyngeal residue - Trace residue within or on the pharyngeal  structures      ESOPHAGEAL PHASE:  Esophageal clearance - Esophageal retention with retrograde flow  below the pharyngoesophageal segment       SLP Impressions with Severity Rating:  Pt presents with functional oropharyngeal swallow and moderate to  severe esophageal dysphagia upon completion of modified barium  swallow study this date. Swallowing physiology is detailed above.  Impairments most impacting swallowing safety and efficiency include  decreased tongue base retraction.  Patient did not demonstrate  penetration or aspiration for any consistency. Patient demonstrated  trace oral and pharyngeal residue that was reduced with repeat  swallows.      In the A-P view, patient demonstrated minimal esophageal clearance of  contrast with retrograde flow below the pharyngoesophageal segment.  Of note: The A-P bolus follow-through is  not intended to be utilized  as a diagnostic assessment of the esophagus, rather a tool to observe  the biomechanical aspects of the swallow continuum and to inform the  need for further evaluation by medical specialists, as applicable.      Per the results of this assessment, patient is appropriate to  continue baseline diet, with additional recommendations as noted  above. No further skilled ST services are warranted per this  assessment. Recommend continuing with GI follow-up as planned.      OUTCOME MEASURES:  Functional Oral Intake Scale  Functional Oral Intake Scale: Level 7        total oral diet with no  restrictions      Eating Assessment Tool (EAT-10)  0=No problem, 1=Mild problem, 2=Mild to moderate problem, 3=Moderate  problem, 4=Severe problem      EAT 10  My swallowing problem has caused me to lose weight.: 0  My swallowing problem interferes with my ability to go out for  meals.: 2 Swallowing liquids takes extra effort.: 2  Swallowing solids takes extra effort.: 0  Swallowing pills takes extra effort.: 4  Swallowing is painful: 2  The pleasure of eating is affected by my swallowing.: 1  When I swallow food sticks in my throat.: 0  I cough when I eat.: 3  Swallowing is stressful: 4  EAT-10 TOTAL SCORE:: 18      Rosenbek's Penetration Aspiration Scale  Thin Liquids: 1. NO ASPIRATION & NO PENETRATION - no aspiration,  contrast does not enter airway [facility] [place] Thick Liquids: 1. NO ASPIRATION  & NO PENETRATION - no aspiration, contrast does not enter airway  Honey Thick Liquids: 1. NO ASPIRATION & NO PENETRATION - no  aspiration, contrast does not enter airway Puree: 1. NO ASPIRATION  & NO PENETRATION - no aspiration, contrast does not enter airway  Solids: 1. NO ASPIRATION & NO PENETRATION - no aspiration, contrast  does not enter airway Speech Therapy section of this report signed by  Alannah Goodwin MS, CCC-SLP on 10/3/2024 at 5:41 pm.      RADIOLOGY FINDINGS:  Cervical spine degenerative changes. Trace  anterolisthesis C4-5.  Paraesophageal hiatal hernia is present. Esophageal tertiary waves  indicate dysmotility.      IMPRESSION:  No aspiration observed.  Paraesophageal hiatal hernia, partially characterized.  Esophageal dysmotility.  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)     Anti-pneumococcal polysaccharide antibody deficiency    COVID-19 09/21/2022    COVID-19 virus infection    COVID-19 virus infection 06/21/2023    Dysphagia, unspecified     Dysphagia, idiopathic    Encounter for screening for malignant neoplasm of cervix     Pap smear for cervical cancer screening    Localized edema 05/04/2022    Bilateral edema of lower extremity    Parkinson's disease (Multi) 06/21/2022    Parkinsonism    Personal history of other diseases of the digestive system 07/18/2018    History of hiatal hernia    Personal history of other diseases of the musculoskeletal system and connective tissue     History of osteoarthritis    Personal history of other diseases of the respiratory system 10/08/2017    History of asthma    Personal history of other endocrine, nutritional and metabolic disease 08/08/2016    History of hyperlipidemia    Personal history of other endocrine, nutritional and metabolic disease 10/08/2017    History of hypothyroidism    Personal history of other malignant neoplasm of skin     History of malignant neoplasm of skin    Personal history of other medical treatment     History of screening mammography    Personal history of pneumonia (recurrent) 03/18/2020    History of pneumonia due to Pseudomonas    Personal history of pneumonia (recurrent) 01/29/2021    History of pneumonia due to Pseudomonas    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    Raynaud's syndrome without gangrene     Raynaud disease        PAST SURGICAL HISTORY:  Past Surgical  History:   Procedure Laterality Date    BREAST LUMPECTOMY  06/23/2017    Right Breast Lumpectomy    COLONOSCOPY  07/29/2015    Complete Colonoscopy    GASTRIC FUNDOPLICATION  07/19/2018    Esophagogastric Fundoplasty Nissen Fundoplication    OTHER SURGICAL HISTORY  02/19/2015    Treatment Of Jaw    OTHER SURGICAL HISTORY  06/06/2019    Nissen fundoplication laparoscopic    OTHER SURGICAL HISTORY  06/23/2017    Biopsy Pleural    OTHER SURGICAL HISTORY  06/23/2017    Shoulder Repair    SINUS SURGERY  06/23/2017    Sinus Surgery    TONSILLECTOMY  06/23/2017    Tonsillectomy       FAMILY HISTORY:  Family History   Problem Relation Name Age of Onset    Other (cerebrovascular accident) Mother      COPD Father      Prostate cancer Brother          SOCIAL HISTORY:  Social History     Tobacco Use    Smoking status: Never     Passive exposure: Never    Smokeless tobacco: Never   Vaping Use    Vaping status: Never Used   Substance Use Topics    Alcohol use: Not Currently    Drug use: Never       MEDICATIONS:  Prior to Admission Medications:  @[employer] GroupDREVIEW@    ALLERGIES:  Allergies   Allergen Reactions    Doxycycline Other and Unknown    Prednisone Unknown     Red and hot when taking prednisone    Topiramate Other, Nausea Only, Palpitations and Unknown       REVIEW OF SYSTEMS:  GENERAL: Negative for malaise, significant weight loss and fever  NECK: Negative for lumps, goiter, pain and significant neck swelling  RESPIRATORY: Negative for cough, wheezing or shortness of breath.  CARDIOVASCULAR: Negative for chest pain, leg swelling or palpitations.  GI: Negative for abdominal discomfort, blood in stools or black stools or change in bowel habits  : No history of dysuria, frequency or incontinence  MUSCULOSKELETAL: Negative for joint pain or swelling, back pain or muscle pain.  SKIN: Negative for lesions, rash, and itching.  PSYCH: Negative for sleep disturbance, mood disorder and recent psychosocial stressors.  ENDOCRINE: Negative  for cold or heat intolerance, polyuria, polydipsia and goiter.    PHYSICAL EXAM:  [unfilled]  There is no height or weight on file to calculate BMI.   General appearance:   Skin: warm, no erythema or rashes  Lungs: clear to percussion and auscultation  Heart: regular rhythm and S1, S2 normal  Abdomen: soft, non-tender, no masses, no organomegaly  Extremities: Normal exam of the extremities. No swelling or pain.  Neck: supple with no nodes      IMPRESSION:  Oneyda Parmar is a 68 y.o. female with recurrent hiatal hernia and significant symptoms including coughing, projectile vomiting.    Studies reviewed and I see a recurrence of the hiatal hernia, a slip of the plication and a sharp kink in the esophagus.  We will plan a redo HH repair with mesh, take down of nissen and toupet fundoplicatgion.  We discussed the risks and benefits of the procedure at length.   We discussed using a mesh and also converting the nissen to a toupet.  She would like to get back to piano, flute and bowling.    She prefers main [place].  She notes 2 really bad lung infection and was coughing so much and so hard and may have something to do with it.   We discussed the risks and benefits at [facility].  She is a great candidate for surgery.  She is eager to proceed.     PLAN:  Keep food moist  Avoid laying down within 2 hours of eating  Eat more frequent smaller meals  Wash food down with liquids  Chew well and eat slowly.   Rice, bread and pasta and potatoes are likely to get stuck  We will plan for surgery for as soon as possible    Dr. Paty Luz M.D., MPH  Director of Bariatric & Minimally Invasive Surgery  [place] [address]  T:  [phone]  F:  [phone]    Saige Devlin, RN Assistant Nurse Manager, Care Coordinator Patient Nursing Contact  T: [phone]  F: [phone]    Faustina Valdivia LPN Coordinator  T: [phone] F: [phone]

## 2024-10-22 ENCOUNTER — HOSPITAL ENCOUNTER (OUTPATIENT)
Dept: RADIOLOGY | Facility: CLINIC | Age: 69
Discharge: HOME | End: 2024-10-22
Payer: MEDICARE

## 2024-10-22 ENCOUNTER — APPOINTMENT (OUTPATIENT)
Dept: RHEUMATOLOGY | Facility: CLINIC | Age: 69
End: 2024-10-22
Payer: MEDICARE

## 2024-10-22 VITALS
RESPIRATION RATE: 20 BRPM | WEIGHT: 159 LBS | DIASTOLIC BLOOD PRESSURE: 76 MMHG | HEART RATE: 86 BPM | SYSTOLIC BLOOD PRESSURE: 123 MMHG | BODY MASS INDEX: 26.06 KG/M2

## 2024-10-22 DIAGNOSIS — E55.9 VITAMIN D DEFICIENCY: ICD-10-CM

## 2024-10-22 DIAGNOSIS — M35.1 MCTD (MIXED CONNECTIVE TISSUE DISEASE) (MULTI): Primary | ICD-10-CM

## 2024-10-22 DIAGNOSIS — K21.9 GASTROESOPHAGEAL REFLUX DISEASE WITHOUT ESOPHAGITIS: ICD-10-CM

## 2024-10-22 DIAGNOSIS — K44.9 HIATAL HERNIA: ICD-10-CM

## 2024-10-22 DIAGNOSIS — Z79.899 ON SULFASALAZINE THERAPY: ICD-10-CM

## 2024-10-22 DIAGNOSIS — D83.9 CVID (COMMON VARIABLE IMMUNODEFICIENCY): ICD-10-CM

## 2024-10-22 DIAGNOSIS — M25.473 ANKLE SWELLING, UNSPECIFIED LATERALITY: ICD-10-CM

## 2024-10-22 DIAGNOSIS — M35.1 MCTD (MIXED CONNECTIVE TISSUE DISEASE) (MULTI): ICD-10-CM

## 2024-10-22 PROCEDURE — 73600 X-RAY EXAM OF ANKLE: CPT | Mod: BILATERAL PROCEDURE | Performed by: RADIOLOGY

## 2024-10-22 PROCEDURE — 73600 X-RAY EXAM OF ANKLE: CPT | Mod: 50

## 2024-10-22 RX ORDER — ASCORBIC ACID 125 MG
1000 TABLET,CHEWABLE ORAL DAILY
Qty: 90 EACH | Refills: 1 | Status: SHIPPED | OUTPATIENT
Start: 2024-10-22 | End: 2025-04-20

## 2024-10-23 ENCOUNTER — OFFICE VISIT (OUTPATIENT)
Dept: SURGERY | Facility: CLINIC | Age: 69
End: 2024-10-23
Payer: MEDICARE

## 2024-10-23 VITALS
BODY MASS INDEX: 26.38 KG/M2 | HEART RATE: 61 BPM | DIASTOLIC BLOOD PRESSURE: 72 MMHG | WEIGHT: 161 LBS | SYSTOLIC BLOOD PRESSURE: 128 MMHG

## 2024-10-23 DIAGNOSIS — K21.9 GASTROESOPHAGEAL REFLUX DISEASE WITHOUT ESOPHAGITIS: ICD-10-CM

## 2024-10-23 DIAGNOSIS — K44.9 HIATAL HERNIA: ICD-10-CM

## 2024-10-23 DIAGNOSIS — R11.14 BILIOUS VOMITING WITH NAUSEA: Primary | ICD-10-CM

## 2024-10-23 DIAGNOSIS — K21.9 PARAESOPHAGEAL HERNIA WITH GASTROESOPHAGEAL REFLUX: ICD-10-CM

## 2024-10-23 DIAGNOSIS — K44.9 PARAESOPHAGEAL HERNIA WITH GASTROESOPHAGEAL REFLUX: ICD-10-CM

## 2024-10-23 PROCEDURE — 99214 OFFICE O/P EST MOD 30 MIN: CPT | Performed by: SURGERY

## 2024-10-23 NOTE — PATIENT INSTRUCTIONS
PLAN:  Keep food moist  Avoid laying down within 2 hours of eating  Eat more frequent smaller meals  Wash food down with liquids  Chew well and eat slowly.   Rice, bread and pasta and potatoes are likely to get stuck  We will plan for surgery for as soon as possible    Dr. Paty Luz M.D., MPH  Director of Bariatric & Minimally Invasive Surgery  [facility]  [id number] [place] [address]  T:  [phone]  F:  [phone]    Saige Devlin, RN Assistant Nurse Manager, Care Coordinator Patient Nursing Contact  T: [phone]  F: [phone]    Faustina Valdivia LPN Coordinator  T: [phone] F: [phone]

## 2024-10-25 LAB
ACID FAST STN SPEC: NORMAL
MYCOBACTERIUM SPEC CULT: NORMAL

## 2024-10-28 ENCOUNTER — APPOINTMENT (OUTPATIENT)
Dept: PULMONOLOGY | Facility: CLINIC | Age: 69
End: 2024-10-28
Payer: MEDICARE

## 2024-10-28 VITALS
BODY MASS INDEX: 26.82 KG/M2 | HEART RATE: 63 BPM | TEMPERATURE: 97.9 F | HEIGHT: 65 IN | SYSTOLIC BLOOD PRESSURE: 143 MMHG | DIASTOLIC BLOOD PRESSURE: 86 MMHG | WEIGHT: 161 LBS | OXYGEN SATURATION: 97 %

## 2024-10-28 DIAGNOSIS — K44.9 HIATAL HERNIA: ICD-10-CM

## 2024-10-28 DIAGNOSIS — G20.A1 PARKINSON'S DISEASE, UNSPECIFIED WHETHER DYSKINESIA PRESENT, UNSPECIFIED WHETHER MANIFESTATIONS FLUCTUATE: ICD-10-CM

## 2024-10-28 DIAGNOSIS — T17.908D ASPIRATION INTO AIRWAY, SUBSEQUENT ENCOUNTER: ICD-10-CM

## 2024-10-28 DIAGNOSIS — J15.1: ICD-10-CM

## 2024-10-28 DIAGNOSIS — K21.00 GASTROESOPHAGEAL REFLUX DISEASE WITH ESOPHAGITIS WITHOUT HEMORRHAGE: ICD-10-CM

## 2024-10-28 DIAGNOSIS — J47.9 BRONCHIECTASIS WITHOUT ACUTE EXACERBATION (MULTI): ICD-10-CM

## 2024-10-28 DIAGNOSIS — R25.1 TREMORS OF NERVOUS SYSTEM: ICD-10-CM

## 2024-10-28 DIAGNOSIS — M35.1 MCTD (MIXED CONNECTIVE TISSUE DISEASE) (MULTI): Primary | ICD-10-CM

## 2024-10-28 ASSESSMENT — PAIN SCALES - GENERAL: PAINLEVEL_OUTOF10: 0-NO PAIN

## 2024-10-28 ASSESSMENT — ENCOUNTER SYMPTOMS
OCCASIONAL FEELINGS OF UNSTEADINESS: 0
LOSS OF SENSATION IN FEET: 0
DEPRESSION: 0

## 2024-10-31 ENCOUNTER — APPOINTMENT (OUTPATIENT)
Dept: GASTROENTEROLOGY | Facility: CLINIC | Age: 69
End: 2024-10-31
Payer: MEDICARE

## 2024-10-31 ENCOUNTER — HOSPITAL ENCOUNTER (OUTPATIENT)
Facility: HOSPITAL | Age: 69
Setting detail: SURGERY ADMIT
End: 2024-10-31
Attending: SURGERY | Admitting: SURGERY
Payer: MEDICARE

## 2024-10-31 PROBLEM — K44.9 PARAESOPHAGEAL HERNIA WITH GASTROESOPHAGEAL REFLUX: Status: ACTIVE | Noted: 2024-10-23

## 2024-10-31 PROBLEM — K21.9 PARAESOPHAGEAL HERNIA WITH GASTROESOPHAGEAL REFLUX: Status: ACTIVE | Noted: 2024-10-23

## 2024-11-02 PROBLEM — R25.1 TREMORS OF NERVOUS SYSTEM: Status: ACTIVE | Noted: 2024-11-02

## 2024-11-02 PROBLEM — T17.908A ASPIRATION INTO AIRWAY: Status: ACTIVE | Noted: 2024-11-02

## 2024-11-06 ENCOUNTER — TELEPHONE (OUTPATIENT)
Dept: SURGERY | Facility: HOSPITAL | Age: 69
End: 2024-11-06
Payer: MEDICARE

## 2024-11-06 NOTE — TELEPHONE ENCOUNTER
Received message that patient stated that she having pain in lower abdomen and back associated with blood and blood clots in urine. She is requesting a call.     Telephone call placed to patient. She states her symptoms are improving and are almost back to normal, but 2 days ago did have blood in urine. She states she also has burning when voiding and difficulty holding her urine. Recommended she reach out to her PCP and advised she be evaluated in the ER should she have worsening bleeding and pain. She verbalized understanding.

## 2024-11-07 DIAGNOSIS — R31.0 GROSS HEMATURIA: ICD-10-CM

## 2024-11-07 DIAGNOSIS — R30.0 DYSURIA: Primary | ICD-10-CM

## 2024-11-07 RX ORDER — CIPROFLOXACIN 500 MG/1
500 TABLET ORAL 2 TIMES DAILY
Qty: 20 TABLET | Refills: 0 | Status: SHIPPED | OUTPATIENT
Start: 2024-11-07 | End: 2024-11-17

## 2024-11-09 ENCOUNTER — LAB (OUTPATIENT)
Dept: LAB | Facility: LAB | Age: 69
End: 2024-11-09
Payer: MEDICARE

## 2024-11-09 DIAGNOSIS — R30.0 DYSURIA: ICD-10-CM

## 2024-11-09 DIAGNOSIS — R31.0 GROSS HEMATURIA: ICD-10-CM

## 2024-11-09 LAB
APPEARANCE UR: ABNORMAL
BACTERIA #/AREA URNS AUTO: ABNORMAL /HPF
BILIRUB UR STRIP.AUTO-MCNC: NEGATIVE MG/DL
COLOR UR: YELLOW
GLUCOSE UR STRIP.AUTO-MCNC: NORMAL MG/DL
KETONES UR STRIP.AUTO-MCNC: NEGATIVE MG/DL
LEUKOCYTE ESTERASE UR QL STRIP.AUTO: ABNORMAL
MUCOUS THREADS #/AREA URNS AUTO: ABNORMAL /LPF
NITRITE UR QL STRIP.AUTO: NEGATIVE
PH UR STRIP.AUTO: 5.5 [PH]
PROT UR STRIP.AUTO-MCNC: ABNORMAL MG/DL
RBC # UR STRIP.AUTO: ABNORMAL /UL
RBC #/AREA URNS AUTO: >20 /HPF
SP GR UR STRIP.AUTO: 1.02
SQUAMOUS #/AREA URNS AUTO: ABNORMAL /HPF
TRANS CELLS #/AREA UR COMP ASSIST: ABNORMAL /HPF
UROBILINOGEN UR STRIP.AUTO-MCNC: NORMAL MG/DL
WBC #/AREA URNS AUTO: >50 /HPF

## 2024-11-09 PROCEDURE — 81001 URINALYSIS AUTO W/SCOPE: CPT

## 2024-11-09 PROCEDURE — 87086 URINE CULTURE/COLONY COUNT: CPT

## 2024-11-11 LAB — BACTERIA UR CULT: NORMAL

## 2024-11-14 ENCOUNTER — APPOINTMENT (OUTPATIENT)
Dept: RADIOLOGY | Facility: HOSPITAL | Age: 69
End: 2024-11-14
Payer: MEDICARE

## 2024-11-14 ENCOUNTER — HOSPITAL ENCOUNTER (OUTPATIENT)
Facility: HOSPITAL | Age: 69
Setting detail: OBSERVATION
Discharge: HOME | End: 2024-11-15
Attending: EMERGENCY MEDICINE | Admitting: STUDENT IN AN ORGANIZED HEALTH CARE EDUCATION/TRAINING PROGRAM
Payer: MEDICARE

## 2024-11-14 ENCOUNTER — TELEPHONE (OUTPATIENT)
Dept: INFECTIOUS DISEASES | Facility: CLINIC | Age: 69
End: 2024-11-14
Payer: MEDICARE

## 2024-11-14 DIAGNOSIS — R11.2 NAUSEA AND VOMITING, UNSPECIFIED VOMITING TYPE: ICD-10-CM

## 2024-11-14 DIAGNOSIS — K21.9 GASTROESOPHAGEAL REFLUX DISEASE WITHOUT ESOPHAGITIS: ICD-10-CM

## 2024-11-14 DIAGNOSIS — J69.0 ASPIRATION PNEUMONIA OF RIGHT LOWER LOBE DUE TO VOMIT (MULTI): Primary | ICD-10-CM

## 2024-11-14 LAB
ALBUMIN SERPL BCP-MCNC: 4 G/DL (ref 3.4–5)
ALP SERPL-CCNC: 110 U/L (ref 33–136)
ALT SERPL W P-5'-P-CCNC: 18 U/L (ref 7–45)
ANION GAP SERPL CALC-SCNC: 13 MMOL/L (ref 10–20)
APPEARANCE UR: CLEAR
AST SERPL W P-5'-P-CCNC: 24 U/L (ref 9–39)
BASOPHILS # BLD AUTO: 0.04 X10*3/UL (ref 0–0.1)
BASOPHILS NFR BLD AUTO: 0.3 %
BILIRUB SERPL-MCNC: 1 MG/DL (ref 0–1.2)
BILIRUB UR STRIP.AUTO-MCNC: NEGATIVE MG/DL
BUN SERPL-MCNC: 15 MG/DL (ref 6–23)
CALCIUM SERPL-MCNC: 9.2 MG/DL (ref 8.6–10.3)
CHLORIDE SERPL-SCNC: 102 MMOL/L (ref 98–107)
CO2 SERPL-SCNC: 26 MMOL/L (ref 21–32)
COLOR UR: ABNORMAL
CREAT SERPL-MCNC: 0.79 MG/DL (ref 0.5–1.05)
EGFRCR SERPLBLD CKD-EPI 2021: 81 ML/MIN/1.73M*2
EOSINOPHIL # BLD AUTO: 0.17 X10*3/UL (ref 0–0.7)
EOSINOPHIL NFR BLD AUTO: 1.3 %
ERYTHROCYTE [DISTWIDTH] IN BLOOD BY AUTOMATED COUNT: 12.9 % (ref 11.5–14.5)
GLUCOSE SERPL-MCNC: 103 MG/DL (ref 74–99)
GLUCOSE UR STRIP.AUTO-MCNC: NORMAL MG/DL
HCT VFR BLD AUTO: 39.6 % (ref 36–46)
HGB BLD-MCNC: 12.9 G/DL (ref 12–16)
IMM GRANULOCYTES # BLD AUTO: 0.02 X10*3/UL (ref 0–0.7)
IMM GRANULOCYTES NFR BLD AUTO: 0.2 % (ref 0–0.9)
KETONES UR STRIP.AUTO-MCNC: NEGATIVE MG/DL
LEUKOCYTE ESTERASE UR QL STRIP.AUTO: ABNORMAL
LIPASE SERPL-CCNC: 18 U/L (ref 9–82)
LYMPHOCYTES # BLD AUTO: 0.99 X10*3/UL (ref 1.2–4.8)
LYMPHOCYTES NFR BLD AUTO: 7.8 %
MCH RBC QN AUTO: 29.7 PG (ref 26–34)
MCHC RBC AUTO-ENTMCNC: 32.6 G/DL (ref 32–36)
MCV RBC AUTO: 91 FL (ref 80–100)
MONOCYTES # BLD AUTO: 0.71 X10*3/UL (ref 0.1–1)
MONOCYTES NFR BLD AUTO: 5.6 %
MUCOUS THREADS #/AREA URNS AUTO: ABNORMAL /LPF
NEUTROPHILS # BLD AUTO: 10.78 X10*3/UL (ref 1.2–7.7)
NEUTROPHILS NFR BLD AUTO: 84.8 %
NITRITE UR QL STRIP.AUTO: NEGATIVE
NRBC BLD-RTO: 0 /100 WBCS (ref 0–0)
PH UR STRIP.AUTO: 7.5 [PH]
PLATELET # BLD AUTO: 252 X10*3/UL (ref 150–450)
POTASSIUM SERPL-SCNC: 4.1 MMOL/L (ref 3.5–5.3)
PROT SERPL-MCNC: 7.4 G/DL (ref 6.4–8.2)
PROT UR STRIP.AUTO-MCNC: ABNORMAL MG/DL
RBC # BLD AUTO: 4.35 X10*6/UL (ref 4–5.2)
RBC # UR STRIP.AUTO: ABNORMAL /UL
RBC #/AREA URNS AUTO: ABNORMAL /HPF
SODIUM SERPL-SCNC: 137 MMOL/L (ref 136–145)
SP GR UR STRIP.AUTO: 1.02
SQUAMOUS #/AREA URNS AUTO: ABNORMAL /HPF
UROBILINOGEN UR STRIP.AUTO-MCNC: NORMAL MG/DL
WBC # BLD AUTO: 12.7 X10*3/UL (ref 4.4–11.3)
WBC #/AREA URNS AUTO: ABNORMAL /HPF

## 2024-11-14 PROCEDURE — 96375 TX/PRO/DX INJ NEW DRUG ADDON: CPT | Mod: 59

## 2024-11-14 PROCEDURE — 36415 COLL VENOUS BLD VENIPUNCTURE: CPT

## 2024-11-14 PROCEDURE — 81001 URINALYSIS AUTO W/SCOPE: CPT

## 2024-11-14 PROCEDURE — 2500000004 HC RX 250 GENERAL PHARMACY W/ HCPCS (ALT 636 FOR OP/ED)

## 2024-11-14 PROCEDURE — G0378 HOSPITAL OBSERVATION PER HR: HCPCS

## 2024-11-14 PROCEDURE — 74177 CT ABD & PELVIS W/CONTRAST: CPT

## 2024-11-14 PROCEDURE — 2550000001 HC RX 255 CONTRASTS: Performed by: EMERGENCY MEDICINE

## 2024-11-14 PROCEDURE — 99285 EMERGENCY DEPT VISIT HI MDM: CPT

## 2024-11-14 PROCEDURE — 87086 URINE CULTURE/COLONY COUNT: CPT | Mod: STJLAB

## 2024-11-14 PROCEDURE — 2500000001 HC RX 250 WO HCPCS SELF ADMINISTERED DRUGS (ALT 637 FOR MEDICARE OP): Performed by: EMERGENCY MEDICINE

## 2024-11-14 PROCEDURE — 85025 COMPLETE CBC W/AUTO DIFF WBC: CPT

## 2024-11-14 PROCEDURE — 96372 THER/PROPH/DIAG INJ SC/IM: CPT

## 2024-11-14 PROCEDURE — 2500000005 HC RX 250 GENERAL PHARMACY W/O HCPCS: Performed by: EMERGENCY MEDICINE

## 2024-11-14 PROCEDURE — 74177 CT ABD & PELVIS W/CONTRAST: CPT | Mod: FOREIGN READ | Performed by: RADIOLOGY

## 2024-11-14 PROCEDURE — 83690 ASSAY OF LIPASE: CPT

## 2024-11-14 PROCEDURE — 99285 EMERGENCY DEPT VISIT HI MDM: CPT | Mod: 25

## 2024-11-14 PROCEDURE — 2500000001 HC RX 250 WO HCPCS SELF ADMINISTERED DRUGS (ALT 637 FOR MEDICARE OP)

## 2024-11-14 PROCEDURE — 80053 COMPREHEN METABOLIC PANEL: CPT

## 2024-11-14 PROCEDURE — 96365 THER/PROPH/DIAG IV INF INIT: CPT | Mod: 59

## 2024-11-14 RX ORDER — ALBUTEROL SULFATE 90 UG/1
2 INHALANT RESPIRATORY (INHALATION) EVERY 4 HOURS PRN
Status: DISCONTINUED | OUTPATIENT
Start: 2024-11-14 | End: 2024-11-14

## 2024-11-14 RX ORDER — ALUMINUM HYDROXIDE, MAGNESIUM HYDROXIDE, AND SIMETHICONE 1200; 120; 1200 MG/30ML; MG/30ML; MG/30ML
30 SUSPENSION ORAL ONCE
Status: COMPLETED | OUTPATIENT
Start: 2024-11-14 | End: 2024-11-14

## 2024-11-14 RX ORDER — METOCLOPRAMIDE HYDROCHLORIDE 5 MG/ML
10 INJECTION INTRAMUSCULAR; INTRAVENOUS EVERY 6 HOURS PRN
Status: DISCONTINUED | OUTPATIENT
Start: 2024-11-14 | End: 2024-11-15

## 2024-11-14 RX ORDER — PRAMIPEXOLE DIHYDROCHLORIDE 0.5 MG/1
0.5 TABLET ORAL 3 TIMES DAILY
Status: DISCONTINUED | OUTPATIENT
Start: 2024-11-14 | End: 2024-11-15 | Stop reason: HOSPADM

## 2024-11-14 RX ORDER — ONDANSETRON HYDROCHLORIDE 2 MG/ML
4 INJECTION, SOLUTION INTRAVENOUS ONCE
Status: COMPLETED | OUTPATIENT
Start: 2024-11-14 | End: 2024-11-14

## 2024-11-14 RX ORDER — PROPRANOLOL HYDROCHLORIDE 80 MG/1
80 CAPSULE, EXTENDED RELEASE ORAL DAILY
Status: DISCONTINUED | OUTPATIENT
Start: 2024-11-14 | End: 2024-11-15 | Stop reason: HOSPADM

## 2024-11-14 RX ORDER — PANTOPRAZOLE SODIUM 40 MG/1
40 TABLET, DELAYED RELEASE ORAL
Status: DISCONTINUED | OUTPATIENT
Start: 2024-11-15 | End: 2024-11-15

## 2024-11-14 RX ORDER — ENOXAPARIN SODIUM 100 MG/ML
40 INJECTION SUBCUTANEOUS EVERY 24 HOURS
Status: DISCONTINUED | OUTPATIENT
Start: 2024-11-14 | End: 2024-11-15 | Stop reason: HOSPADM

## 2024-11-14 RX ORDER — METOCLOPRAMIDE HYDROCHLORIDE 5 MG/ML
10 INJECTION INTRAMUSCULAR; INTRAVENOUS ONCE
Status: COMPLETED | OUTPATIENT
Start: 2024-11-14 | End: 2024-11-14

## 2024-11-14 RX ORDER — POLYETHYLENE GLYCOL 3350 17 G/17G
17 POWDER, FOR SOLUTION ORAL DAILY
Status: DISCONTINUED | OUTPATIENT
Start: 2024-11-14 | End: 2024-11-15 | Stop reason: HOSPADM

## 2024-11-14 RX ORDER — CARBIDOPA AND LEVODOPA 25; 100 MG/1; MG/1
1 TABLET ORAL 3 TIMES DAILY
Status: DISCONTINUED | OUTPATIENT
Start: 2024-11-14 | End: 2024-11-14

## 2024-11-14 RX ORDER — IPRATROPIUM BROMIDE AND ALBUTEROL SULFATE 2.5; .5 MG/3ML; MG/3ML
3 SOLUTION RESPIRATORY (INHALATION)
Status: DISCONTINUED | OUTPATIENT
Start: 2024-11-14 | End: 2024-11-14

## 2024-11-14 RX ORDER — ALBUTEROL SULFATE 0.83 MG/ML
2.5 SOLUTION RESPIRATORY (INHALATION) EVERY 4 HOURS PRN
Status: DISCONTINUED | OUTPATIENT
Start: 2024-11-14 | End: 2024-11-15 | Stop reason: HOSPADM

## 2024-11-14 RX ORDER — LIDOCAINE HYDROCHLORIDE 20 MG/ML
15 SOLUTION OROPHARYNGEAL ONCE
Status: COMPLETED | OUTPATIENT
Start: 2024-11-14 | End: 2024-11-14

## 2024-11-14 SDOH — ECONOMIC STABILITY: FOOD INSECURITY: WITHIN THE PAST 12 MONTHS, YOU WORRIED THAT YOUR FOOD WOULD RUN OUT BEFORE YOU GOT THE MONEY TO BUY MORE.: NEVER TRUE

## 2024-11-14 SDOH — SOCIAL STABILITY: SOCIAL INSECURITY: HAVE YOU HAD ANY THOUGHTS OF HARMING ANYONE ELSE?: NO

## 2024-11-14 SDOH — ECONOMIC STABILITY: INCOME INSECURITY: IN THE PAST 12 MONTHS HAS THE ELECTRIC, GAS, OIL, OR WATER COMPANY THREATENED TO SHUT OFF SERVICES IN YOUR HOME?: NO

## 2024-11-14 SDOH — SOCIAL STABILITY: SOCIAL INSECURITY
WITHIN THE LAST YEAR, HAVE YOU BEEN RAPED OR FORCED TO HAVE ANY KIND OF SEXUAL ACTIVITY BY YOUR PARTNER OR EX-PARTNER?: NO

## 2024-11-14 SDOH — SOCIAL STABILITY: SOCIAL INSECURITY: WERE YOU ABLE TO COMPLETE ALL THE BEHAVIORAL HEALTH SCREENINGS?: YES

## 2024-11-14 SDOH — SOCIAL STABILITY: SOCIAL INSECURITY
WITHIN THE LAST YEAR, HAVE YOU BEEN KICKED, HIT, SLAPPED, OR OTHERWISE PHYSICALLY HURT BY YOUR PARTNER OR EX-PARTNER?: NO

## 2024-11-14 SDOH — SOCIAL STABILITY: SOCIAL INSECURITY: WITHIN THE LAST YEAR, HAVE YOU BEEN AFRAID OF YOUR PARTNER OR EX-PARTNER?: NO

## 2024-11-14 SDOH — SOCIAL STABILITY: SOCIAL INSECURITY: HAVE YOU HAD THOUGHTS OF HARMING ANYONE ELSE?: NO

## 2024-11-14 SDOH — SOCIAL STABILITY: SOCIAL INSECURITY: WITHIN THE LAST YEAR, HAVE YOU BEEN HUMILIATED OR EMOTIONALLY ABUSED IN OTHER WAYS BY YOUR PARTNER OR EX-PARTNER?: NO

## 2024-11-14 SDOH — ECONOMIC STABILITY: FOOD INSECURITY: WITHIN THE PAST 12 MONTHS, THE FOOD YOU BOUGHT JUST DIDN'T LAST AND YOU DIDN'T HAVE MONEY TO GET MORE.: NEVER TRUE

## 2024-11-14 SDOH — SOCIAL STABILITY: SOCIAL INSECURITY: ARE THERE ANY APPARENT SIGNS OF INJURIES/BEHAVIORS THAT COULD BE RELATED TO ABUSE/NEGLECT?: NO

## 2024-11-14 SDOH — SOCIAL STABILITY: SOCIAL INSECURITY: ARE YOU OR HAVE YOU BEEN THREATENED OR ABUSED PHYSICALLY, EMOTIONALLY, OR SEXUALLY BY ANYONE?: NO

## 2024-11-14 SDOH — SOCIAL STABILITY: SOCIAL INSECURITY: DOES ANYONE TRY TO KEEP YOU FROM HAVING/CONTACTING OTHER FRIENDS OR DOING THINGS OUTSIDE YOUR HOME?: NO

## 2024-11-14 SDOH — SOCIAL STABILITY: SOCIAL INSECURITY: DO YOU FEEL ANYONE HAS EXPLOITED OR TAKEN ADVANTAGE OF YOU FINANCIALLY OR OF YOUR PERSONAL PROPERTY?: NO

## 2024-11-14 SDOH — SOCIAL STABILITY: SOCIAL INSECURITY: HAS ANYONE EVER THREATENED TO HURT YOUR FAMILY OR YOUR PETS?: NO

## 2024-11-14 SDOH — SOCIAL STABILITY: SOCIAL INSECURITY: ABUSE: ADULT

## 2024-11-14 SDOH — SOCIAL STABILITY: SOCIAL INSECURITY: DO YOU FEEL UNSAFE GOING BACK TO THE PLACE WHERE YOU ARE LIVING?: NO

## 2024-11-14 ASSESSMENT — PAIN - FUNCTIONAL ASSESSMENT
PAIN_FUNCTIONAL_ASSESSMENT: 0-10

## 2024-11-14 ASSESSMENT — PAIN DESCRIPTION - PAIN TYPE
TYPE: ACUTE PAIN
TYPE: ACUTE PAIN

## 2024-11-14 ASSESSMENT — PAIN DESCRIPTION - DESCRIPTORS
DESCRIPTORS: ACHING
DESCRIPTORS: ACHING;DISCOMFORT

## 2024-11-14 ASSESSMENT — ACTIVITIES OF DAILY LIVING (ADL)
LACK_OF_TRANSPORTATION: NO
FEEDING YOURSELF: INDEPENDENT
WALKS IN HOME: INDEPENDENT
HEARING - LEFT EAR: FUNCTIONAL
HEARING - RIGHT EAR: FUNCTIONAL
PATIENT'S MEMORY ADEQUATE TO SAFELY COMPLETE DAILY ACTIVITIES?: YES
BATHING: INDEPENDENT
TOILETING: INDEPENDENT
JUDGMENT_ADEQUATE_SAFELY_COMPLETE_DAILY_ACTIVITIES: YES
DRESSING YOURSELF: INDEPENDENT
GROOMING: INDEPENDENT
ADEQUATE_TO_COMPLETE_ADL: YES

## 2024-11-14 ASSESSMENT — PAIN DESCRIPTION - LOCATION
LOCATION: ABDOMEN
LOCATION: ABDOMEN

## 2024-11-14 ASSESSMENT — LIFESTYLE VARIABLES
TOTAL SCORE: 0
EVER HAD A DRINK FIRST THING IN THE MORNING TO STEADY YOUR NERVES TO GET RID OF A HANGOVER: NO
HOW OFTEN DO YOU HAVE A DRINK CONTAINING ALCOHOL: NEVER
HOW MANY STANDARD DRINKS CONTAINING ALCOHOL DO YOU HAVE ON A TYPICAL DAY: PATIENT DOES NOT DRINK
EVER FELT BAD OR GUILTY ABOUT YOUR DRINKING: NO
HAVE YOU EVER FELT YOU SHOULD CUT DOWN ON YOUR DRINKING: NO
AUDIT-C TOTAL SCORE: 0
HOW OFTEN DO YOU HAVE 6 OR MORE DRINKS ON ONE OCCASION: NEVER
AUDIT-C TOTAL SCORE: 0
HAVE PEOPLE ANNOYED YOU BY CRITICIZING YOUR DRINKING: NO
SKIP TO QUESTIONS 9-10: 1

## 2024-11-14 ASSESSMENT — COGNITIVE AND FUNCTIONAL STATUS - GENERAL
MOBILITY SCORE: 24
PATIENT BASELINE BEDBOUND: NO
DAILY ACTIVITIY SCORE: 24
MOBILITY SCORE: 24
DAILY ACTIVITIY SCORE: 24

## 2024-11-14 ASSESSMENT — PAIN SCALES - GENERAL
PAINLEVEL_OUTOF10: 5 - MODERATE PAIN
PAINLEVEL_OUTOF10: 3
PAINLEVEL_OUTOF10: 8
PAINLEVEL_OUTOF10: 5 - MODERATE PAIN

## 2024-11-14 ASSESSMENT — PATIENT HEALTH QUESTIONNAIRE - PHQ9
1. LITTLE INTEREST OR PLEASURE IN DOING THINGS: NOT AT ALL
2. FEELING DOWN, DEPRESSED OR HOPELESS: NOT AT ALL
SUM OF ALL RESPONSES TO PHQ9 QUESTIONS 1 & 2: 0

## 2024-11-14 ASSESSMENT — CURB65 SCORE
AGE IS GREATER THAN OR EQUAL TO 65: YES
RESPIRATORY RATE GREATER THAN OR EQUAL TO 30: NO
BUN IS GREATER THAN 19 MG/DL: NO
CURB65 SCORE: 1
SBP LESS THAN 90 OR DBNP LESS THAN 60: NO
HAS CONFUSION: NO

## 2024-11-14 ASSESSMENT — COLUMBIA-SUICIDE SEVERITY RATING SCALE - C-SSRS
6. HAVE YOU EVER DONE ANYTHING, STARTED TO DO ANYTHING, OR PREPARED TO DO ANYTHING TO END YOUR LIFE?: NO
1. IN THE PAST MONTH, HAVE YOU WISHED YOU WERE DEAD OR WISHED YOU COULD GO TO SLEEP AND NOT WAKE UP?: NO
2. HAVE YOU ACTUALLY HAD ANY THOUGHTS OF KILLING YOURSELF?: NO

## 2024-11-14 NOTE — CARE PLAN
The patient's goals for the shift include      The clinical goals for the shift include monitor vitals, safety, pain control      Problem: Pain - Adult  Goal: Verbalizes/displays adequate comfort level or baseline comfort level  Outcome: Progressing     Problem: Safety - Adult  Goal: Free from fall injury  Outcome: Progressing     Problem: Discharge Planning  Goal: Discharge to home or other facility with appropriate resources  Outcome: Progressing     Problem: Chronic Conditions and Co-morbidities  Goal: Patient's chronic conditions and co-morbidity symptoms are monitored and maintained or improved  Outcome: Progressing     Problem: Pain  Goal: Takes deep breaths with improved pain control throughout the shift  Outcome: Progressing  Goal: Turns in bed with improved pain control throughout the shift  Outcome: Progressing  Goal: Walks with improved pain control throughout the shift  Outcome: Progressing  Goal: Performs ADL's with improved pain control throughout shift  Outcome: Progressing  Goal: Participates in PT with improved pain control throughout the shift  Outcome: Progressing  Goal: Free from opioid side effects throughout the shift  Outcome: Progressing  Goal: Free from acute confusion related to pain meds throughout the shift  Outcome: Progressing

## 2024-11-14 NOTE — TELEPHONE ENCOUNTER
Patient called office tearful with complaints of projectile vomiting for 2 weeks now. She has contact other providers with no outcome. She requesting your input. States she has called 911 and they were not able to help.

## 2024-11-14 NOTE — ED PROVIDER NOTES
HPI   Chief Complaint   Patient presents with    Vomiting     Pt is scheduled for hiatal hernia repair 12/4/24. Pt states she has been vomiting for 2 weeks. Pt states she is also urinating blood clots. Pt was recently diagnosed with a UTI and has been taking antibiotics, but isn't sure how much of them she is getting due to the vomiting.        Past medical history of Parkinson's disease, HLD, scleroderma, SLE, paraesophageal hiatal hernia with GERD s/p Nissen fundoplication 6 years ago and scheduled for surgery for paraesophageal hernia on 12/4 presenting today for vomiting.  States that over the past 2 weeks, she has been having difficulty eating solids.  Reports that throughout the day and more at night, she has projectile vomiting.  States today started having difficulty with liquids.  Additionally, admits to food getting stuck and globus sensation in her throat and chest.  Endorsing constant coughing and burping.  Denies any chest pain, shortness of breath or fever.    Also admits to hematuria.  Reports cloudy urine.  States that 1.5 weeks ago, had hematuria with bright red urine and clots.  Called PCP and was told that her UA looks like UTI and prescribed Cipro.  Of note, though PCP told her to continue the Cipro.   urine culture did come back negative had been experiencing left-sided flank pain though the flank pain and the hematuria resolved after 2 days of symptoms.              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)     Anti-pneumococcal polysaccharide antibody deficiency    COVID-19 09/21/2022    COVID-19 virus infection    COVID-19 virus infection 06/21/2023    Dysphagia, unspecified     Dysphagia, idiopathic    Encounter for screening for malignant neoplasm of cervix     Pap smear for cervical cancer screening    Localized edema 05/04/2022    Bilateral edema of lower extremity    Parkinson's  disease (Multi) 06/21/2022    Parkinsonism    Personal history of other diseases of the digestive system 07/18/2018    History of hiatal hernia    Personal history of other diseases of the musculoskeletal system and connective tissue     History of osteoarthritis    Personal history of other diseases of the respiratory system 10/08/2017    History of asthma    Personal history of other endocrine, nutritional and metabolic disease 08/08/2016    History of hyperlipidemia    Personal history of other endocrine, nutritional and metabolic disease 10/08/2017    History of hypothyroidism    Personal history of other malignant neoplasm of skin     History of malignant neoplasm of skin    Personal history of other medical treatment     History of screening mammography    Personal history of pneumonia (recurrent) 03/18/2020    History of pneumonia due to Pseudomonas    Personal history of pneumonia (recurrent) 01/29/2021    History of pneumonia due to Pseudomonas    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    Raynaud's syndrome without gangrene     Raynaud disease     Past Surgical History:   Procedure Laterality Date    BREAST LUMPECTOMY  06/23/2017    Right Breast Lumpectomy    COLONOSCOPY  07/29/2015    Complete Colonoscopy    GASTRIC FUNDOPLICATION  07/19/2018    Esophagogastric Fundoplasty Nissen Fundoplication    OTHER SURGICAL HISTORY  02/19/2015    Treatment Of Jaw    OTHER SURGICAL HISTORY  06/06/2019    Nissen fundoplication laparoscopic    OTHER SURGICAL HISTORY  06/23/2017    Biopsy Pleural    OTHER SURGICAL HISTORY  06/23/2017    Shoulder Repair    SINUS SURGERY  06/23/2017    Sinus Surgery    TONSILLECTOMY  06/23/2017    Tonsillectomy     Family History   Problem Relation Name Age of Onset    Other (cerebrovascular accident) Mother      COPD Father      Prostate cancer Brother       Social History     Tobacco Use    Smoking status: Never     Passive exposure: Never     Smokeless tobacco: Never   Vaping Use    Vaping status: Never Used   Substance Use Topics    Alcohol use: Not Currently    Drug use: Never       Physical Exam   ED Triage Vitals [11/14/24 1354]   Temperature Heart Rate Respirations BP   36.5 °C (97.7 °F) 95 16 143/80      Pulse Ox Temp Source Heart Rate Source Patient Position   96 % Temporal Monitor Sitting      BP Location FiO2 (%)     Right arm --       Physical Exam  Vitals and nursing note reviewed.   Constitutional:       General: She is not in acute distress.     Appearance: Normal appearance. She is not ill-appearing.   HENT:      Head: Normocephalic and atraumatic.      Right Ear: External ear normal.      Left Ear: External ear normal.      Nose: Nose normal. No congestion or rhinorrhea.      Mouth/Throat:      Mouth: Mucous membranes are moist.      Pharynx: Oropharynx is clear. No oropharyngeal exudate or posterior oropharyngeal erythema.   Eyes:      Extraocular Movements: Extraocular movements intact.      Conjunctiva/sclera: Conjunctivae normal.      Pupils: Pupils are equal, round, and reactive to light.   Cardiovascular:      Rate and Rhythm: Normal rate and regular rhythm.      Heart sounds: Normal heart sounds.   Pulmonary:      Effort: No accessory muscle usage or respiratory distress.      Breath sounds: Normal breath sounds. No wheezing, rhonchi or rales.   Abdominal:      General: Abdomen is flat. Bowel sounds are normal. There is no distension.      Palpations: Abdomen is soft.      Tenderness: There is no abdominal tenderness. There is no right CVA tenderness or left CVA tenderness.   Musculoskeletal:         General: No swelling or deformity. Normal range of motion.      Cervical back: Normal range of motion and neck supple.      Right lower leg: No edema.      Left lower leg: No edema.   Skin:     General: Skin is warm and dry.      Capillary Refill: Capillary refill takes less than 2 seconds.   Neurological:      General: No focal  deficit present.      Mental Status: She is alert and oriented to person, place, and time.      GCS: GCS eye subscore is 4. GCS verbal subscore is 5. GCS motor subscore is 6.      Cranial Nerves: Cranial nerves 2-12 are intact.      Sensory: No sensory deficit.      Motor: Motor function is intact. No weakness.   Psychiatric:         Mood and Affect: Mood and affect normal.         Speech: Speech normal.         Behavior: Behavior normal. Behavior is cooperative.           ED Course & [facility]   ED Course as of 11/14/24 1755   Thu Nov 14, 2024   1732 CT abdomen pelvis w IV contrast  Mild enhancement of the left uroepithelium without significant  hydronephrosis.  No renal or ureteral calculi.  Please correlate for  polynephritis.  Moderate to large hiatal hernia with ectasia the distal esophagus and  luminal fluid.  Airspace opacity right lower lobe suggesting acute pneumonia.  This  correlate for aspiration..   [ML]   1743 Still throwing up after zofran and GI cocktail. Will trial reglan [ML]      ED Course User Index  [ML] Cris Mullen PA-C         Diagnoses as of 11/14/24 1755   Aspiration pneumonia of right lower lobe due to vomit (Multi)   Nausea and vomiting, unspecified vomiting type                 No data recorded     [place] Coma Scale Score: 15 (11/14/24 1527 : Lexii Lorenzo RN)                           Medical Decision Making  69-year-old female presenting today for intractable nausea and vomiting.  Ongoing for the past 2 weeks.  Reports a paraesophageal hiatal hernia.  Has been having increased difficulty with nausea and vomiting starting today.  Denies any chest pain, shortness of breath.  On arrival afebrile and vital signs stable.  Mild epigastric abdominal discomfort.  Last week was experiencing flank pain though today no CVA tenderness.  Does report lower bilateral back pain.  Will give patient Zofran and GI cocktail for symptomatic control.  Will obtain abdominal labs.  Also complaining of  hematuria from last week.  Hematuria has since resolved though considering flank pain, and the fact that she was urinating gross clots, will proceed with CT abdomen pelvis to rule out stone versus UTI versus [place] versus mass.    CBC does show white count with left shift.  CMP showing no electrolyte abnormalities, transaminitis or KALI.  UA showing trace leukocyte Estrace and blood in her urine.  Lipase within normal limits, no concern for pancreatitis.  Patient still having nausea and vomiting while in the ED after Zofran and GI cocktail.  Will proceed with Reglan.    CT abdomen pelvis showing mild enhancement of the left uroepithelium without hydronephrosis.  No stones noted.  Incidental findings of an air space opacity in the right lower lobe suggesting acute pneumonia, concerning for aspiration.  Considering patient is having difficulty tolerating p.o., and considering I have concern for failed outpatient treatment due to her continuing symptoms of hiatal hernia, will opt for admitting the patient for IV antibiotics of pneumonia.  Discussed findings with patient and is in agreement.    Case discussed with ED attending Dr. Montanez        Procedure  Procedures     Cris Mullen PA-C  11/14/24 5934

## 2024-11-14 NOTE — H&P
Internal Medicine    Admission H&P      Patient Oneyda Parmar PCP iMguel Alvarado DO    MRN [id number]  Admission Date 11/14/2024      Chief Complaint/Reason for Admission:  Oneyda is a 69 y.o. female who presented today with vomiting     Subjective    Subjective   History of Presenting Illness  Ms. 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  Patient had difficulty eating has been experiencing subjective projectile vomiting.  Patient also states they feel like they are having increasing difficulty with solids and liquids, feeling of food getting stuck in her throat and chest..  Patient states she is having nausea and cough, burping.  Denies chest pain, shortness of breath, fever, chills, abdominal pain.  Patient is also being treated for UTI with ciprofloxacin twice daily for 10 days.  There was concern for inappropriate coverage due to vomiting.  Patient states that she is no longer having hematuria for the last week.      ED course:  V/S: Tmax 30 6.5C, HR 95, RR 16, /80, SpO2 96% on room air  Labs: CMP unremarkable, WBC 12.7, absolute neutrophils 10.7, UA: Trace hematuria, negative nitrites, 25 leukocyte esterase  EKG: NSR  Imaging: CT abdomen pelvis: Mild enhancement of the left uroepithelium without significant hydronephrosis.  No renal or ureteral calculi. Moderate to large hiatal hernia with ectasia the distal esophagus and luminal fluid.  Airspace opacity right lower lobe suggesting acute pneumonia.  This  correlate for aspiration.  Intervention: Antibiotics, fluids, nausea meds    Past Medical History  Active Ambulatory Problems     Diagnosis Date Noted    Abdominal bloating 06/21/2023    Arthritis 06/21/2023     Atrophic vaginitis 06/21/2023    Back pain, chronic 06/21/2023    Chronic low back pain 06/21/2023    Bilateral edema of lower extremity 06/21/2023    Borderline hyperlipidemia 06/21/2023    Bronchiectasis without acute exacerbation (Multi) 06/21/2023    Cervical somatic dysfunction 06/21/2023    Chronic constipation 06/21/2023    Chronic sinusitis 06/21/2023    Contusion of ribs, left, sequela 06/21/2023    Chronic cough 06/21/2023    Contusion, shoulder and upper arm, multiple sites, left, subsequent encounter 06/21/2023    CVID (common variable immunodeficiency) 06/21/2023    Fibroid, uterine 06/21/2023    Gastroesophageal reflux disease 06/21/2023    Dysphagia 06/21/2023    Hiatal hernia 06/21/2023    MCTD (mixed connective tissue disease) (Multi) 06/21/2023    Migraine without aura and without status migrainosus, not intractable 06/21/2023    Overactive bladder 06/21/2023    Palpitations 06/21/2023    Pulmonary nodules 06/21/2023    Raynaud's syndrome 06/21/2023    RLS (restless legs syndrome) 06/21/2023    Scleroderma (Multi) 06/21/2023    Scoliosis 06/21/2023    Senile osteoporosis 06/21/2023    Somatic dysfunction of lumbosacral region 06/21/2023    Somatic dysfunction of pelvic region 06/21/2023    Strain of thoracic region 06/21/2023    Systemic lupus erythematosus (Multi) 06/21/2023    Thoracic myofascial strain 06/21/2023    Vasomotor instability 06/21/2023    Venous insufficiency (chronic) (peripheral) 06/21/2023    Vitamin D deficiency 06/21/2023    Medicare annual wellness visit, subsequent 06/26/2023    Parkinson's disease (Multi) 12/31/2023    Major depressive disorder, recurrent, mild (CMS-[facility]) 10/10/2023    Major depressive disorder, recurrent, moderate 10/10/2023    Major depressive disorder, recurrent, unspecified 10/10/2023    Abnormal CT of the chest 07/15/2024    Atypical mycobacterial disease 07/15/2024    Postural kyphosis of cervicothoracic region 07/17/2024    PONV (postoperative nausea  and vomiting) 10/07/2024    Pneumonia of right lung due to Pseudomonas species (Multi) 10/28/2024    Paraesophageal hernia with gastroesophageal reflux 10/23/2024    Aspiration into airway 11/02/2024    Tremors of nervous system 11/02/2024     Resolved Ambulatory Problems     Diagnosis Date Noted    COVID-19 virus infection 06/21/2023     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)     COVID-19 09/21/2022    Dysphagia, unspecified     Encounter for screening for malignant neoplasm of cervix     Localized edema 05/04/2022    Personal history of other diseases of the digestive system 07/18/2018    Personal history of other diseases of the musculoskeletal system and connective tissue     Personal history of other diseases of the respiratory system 10/08/2017    Personal history of other endocrine, nutritional and metabolic disease 08/08/2016    Personal history of other endocrine, nutritional and metabolic disease 10/08/2017    Personal history of other malignant neoplasm of skin     Personal history of other medical treatment     Personal history of pneumonia (recurrent) 03/18/2020    Personal history of pneumonia (recurrent) 01/29/2021    Pulmonary mycobacterial infection (Multi)     Raynaud's syndrome without gangrene        Past Surgical History   Past Surgical History:   Procedure Laterality Date    BREAST LUMPECTOMY  06/23/2017    Right Breast Lumpectomy    COLONOSCOPY  07/29/2015    Complete Colonoscopy    GASTRIC FUNDOPLICATION  07/19/2018    Esophagogastric Fundoplasty Nissen Fundoplication    OTHER SURGICAL HISTORY  02/19/2015    Treatment Of Jaw    OTHER SURGICAL HISTORY  06/06/2019    Nissen fundoplication laparoscopic    OTHER SURGICAL HISTORY  06/23/2017    Biopsy Pleural    OTHER SURGICAL HISTORY  06/23/2017    Shoulder Repair    SINUS SURGERY  06/23/2017    Sinus Surgery    TONSILLECTOMY  06/23/2017     Tonsillectomy       Social History  Social History     Socioeconomic History    Marital status:    Tobacco Use    Smoking status: Never     Passive exposure: Never    Smokeless tobacco: Never   Vaping Use    Vaping status: Never Used   Substance and Sexual Activity    Alcohol use: Not Currently    Drug use: Never     Social Drivers of Health     Financial Resource Strain: Low Risk  (11/14/2024)    Overall Financial Resource Strain (CARDIA)     Difficulty of Paying Living Expenses: Not hard at all   Food Insecurity: No Food Insecurity (11/14/2024)    Hunger Vital Sign     Worried About Running Out of Food in the Last Year: Never true     Ran Out of Food in the Last Year: Never true   Transportation Needs: No Transportation Needs (11/14/2024)    PRAPARE - Transportation     Lack of Transportation (Medical): No     Lack of Transportation (Non-Medical): No   Intimate Partner Violence: Not At Risk (11/14/2024)    Humiliation, Afraid, Rape, and Kick questionnaire     Fear of Current or Ex-Partner: No     Emotionally Abused: No     Physically Abused: No     Sexually Abused: No   Housing Stability: Low Risk  (11/14/2024)    Housing Stability Vital Sign     Unable to Pay for Housing in the Last Year: No     Number of Times Moved in the Last Year: 1     Homeless in the Last Year: No       Home Medication:  Prior to Admission medications    Medication Sig Start Date End Date Taking? Authorizing Provider   albuterol 90 mcg/actuation inhaler Inhale 2 puffs every 4 hours if needed. 7/11/24  Yes Historical Provider, MD   cholecalciferol, vitamin D3, 25 mcg (1,000 unit) chewable gummy Take 1 each (1,000 Units) by mouth once daily. 10/22/24 4/20/25 Yes Carly Metzger MD   ciprofloxacin (Cipro) 500 mg tablet Take 1 tablet (500 mg) by mouth 2 times a day for 10 days. 11/7/24 11/17/24 Yes Miguel Alvarado DO   ibuprofen 600 mg tablet Take 1 tablet (600 mg) by mouth every 8 hours if needed for mild pain (1 - 3). 1/8/24  Yes Miguel LANCE  "Christiano, DO   omeprazole (PriLOSEC) 40 mg DR capsule TAKE 1 CAPSULE (40 MG) BY MOUTH ONCE DAILY. DO NOT CRUSH OR CHEW. 10/7/24  Yes Bobby Rosario MD   pramipexole (Mirapex) 0.5 mg tablet Take 1 tablet (0.5 mg) by mouth 3 times a day. 9/25/24  Yes Historical Provider, MD   propranolol LA (Inderal LA) 80 mg 24 hr capsule Take 1 capsule (80 mg) by mouth once daily. 7/17/24  Yes Miguel Alvarado, DO   rizatriptan (Maxalt) 10 mg tablet One po at onset of migraine; can repeat in 2 hrs;  max 20mg daily  Patient taking differently: Take 1 tablet (10 mg) by mouth once daily as needed for migraine. 7/17/24  Yes Miguel Alvarado, DO   carbidopa-levodopa (Sinemet)  mg tablet Take 1 tablet by mouth 3 times a day. Start with half a tablet three times daily for one week then go to one full tablet after that.  Patient not taking: Reported on 11/14/2024 9/16/24 9/16/25  Candido Will MD   immune globulin, human, (Gammagard Liquid) infusion Infuse monthly as directed    Historical Provider, MD   tiotropium (Spiriva with HandiHaler) 18 mcg inhalation capsule Place 1 capsule (18 mcg) into inhaler and inhale once daily.  Patient not taking: Reported on 11/14/2024 7/15/24 7/15/25  Mary Duenas MD        Family History  Family History   Problem Relation Name Age of Onset    Other (cerebrovascular accident) Mother      COPD Father      Prostate cancer Brother       Allergies:  Allergies   Allergen Reactions    Doxycycline Other and Unknown    Prednisone Other     Red and hot when taking prednisone    Topiramate Palpitations and Nausea/vomiting        Review of System:  12 system review completed and negative unless noted in HPI      Objective    Objective   Vital Signs:  Visit Vitals  /59 (BP Location: Right arm, Patient Position: Lying)   Pulse 74   Temp 37.4 °C (99.3 °F) (Temporal)   Resp 16   Ht 1.651 m (5' 5\")   Wt 72.7 kg (160 lb 4.4 oz)   SpO2 97%   BMI 26.67 kg/m²   OB Status Postmenopausal   Smoking Status Never   BSA 1.83 m² "        Physical Examination:    Constitutional: A&Ox4, NAD, resting comfortable   Head and Face: Atraumatic, normocephalic   Eyes: Normal external exam, EOMI  ENT: Normal external inspection of ears and nose. Oropharynx normal.  Cardiovascular: RRR, S1/S2, no murmurs, rubs, or gallops, radial pulses +2  Pulmonary: CTAB, no respiratory distress, no wheezing, rales or rhonchi, on RA  Abdomen: +BS, soft, non-tender, nondistended, no guarding rigidity or rebound tenderness, no masses noted  MSK: Negative for edema, No joint swelling, normal movements of all extremities.   Neuro: No focal deficits, normal motor function, normal sensation, follows all commands  Skin- No lesions, contusions, or erythema.  Psychiatric: Judgment intact. Appropriate mood, affect and behavior    Laboratory Data:    Results from last 7 days   Lab Units 11/14/24  1437   WBC AUTO x10*3/uL 12.7*   RBC AUTO x10*6/uL 4.35   HEMOGLOBIN g/dL 12.9     Results from last 7 days   Lab Units 11/14/24  1437   SODIUM mmol/L 137   POTASSIUM mmol/L 4.1   CHLORIDE mmol/L 102   CO2 mmol/L 26   BUN mg/dL 15   CREATININE mg/dL 0.79   CALCIUM mg/dL 9.2   BILIRUBIN TOTAL mg/dL 1.0   ALT U/L 18   AST U/L 24       Imaging:  CT abdomen pelvis w IV contrast    Result Date: 11/14/2024  STUDY: CT Abdomen and Pelvis with IV Contrast; 11/14/2024 3:57 PM INDICATION: Left flank pain and hematuria (since resolved) last week. COMPARISON: CT chest 7/19/2024. ACCESSION NUMBER(S): [id number] ORDERING CLINICIAN: BLANCA HANNA TECHNIQUE: CT of the abdomen and pelvis was performed.  Contiguous axial images were obtained at 3 mm slice thickness through the abdomen and pelvis. Coronal and sagittal reconstructions at 3 mm slice thickness were performed.  Omnipaque 350--75 mL was administered intravenously.  FINDINGS: LOWER CHEST: No cardiomegaly.  No pericardial effusion.  There is right lower lobe airspace opacity suggesting acute pneumonia.  There is dilatation of the distal  esophagus and is moderate to large hiatal hernia.  ABDOMEN:  LIVER: No hepatomegaly.  Smooth surface contour.  Normal attenuation.  BILE DUCTS: No intrahepatic or extrahepatic biliary ductal dilatation.  GALLBLADDER: The gallbladder is unremarkable. STOMACH: No abnormalities identified.  PANCREAS: No masses or ductal dilatation.  SPLEEN: No splenomegaly or focal splenic lesion.  ADRENAL GLANDS: No thickening or nodules.  KIDNEYS AND URETERS: Kidneys are normal in size and location.  No renal or ureteral calculi.  There is small left renal cyst.  There is mild enhancement of the left uroepithelium.  PELVIS:  BLADDER: No abnormalities identified.  REPRODUCTIVE ORGANS: No abnormalities identified.  BOWEL: No abnormalities identified.  VESSELS: No abnormalities identified.  Abdominal aorta is normal in caliber.  PERITONEUM/RETROPERITONEUM/LYMPH NODES: No free fluid.  No pneumoperitoneum. No lymphadenopathy.  Pelvic phleboliths are demonstrated.  ABDOMINAL WALL: No abnormalities identified. SOFT TISSUES: No abnormalities identified.  BONES: No acute fracture or aggressive osseous lesion.  Displaced angulated degenerative changes at L2-L3-4 with minimal retrolisthesis at these levels.  There is rightward convexity lumbar spine.    Mild enhancement of the left uroepithelium without significant hydronephrosis.  No renal or ureteral calculi.  Please correlate for polynephritis. Moderate to large hiatal hernia with ectasia the distal esophagus and luminal fluid. Airspace opacity right lower lobe suggesting acute pneumonia.  This correlate for aspiration.. Signed by Bandar Page DO      Medications:  Scheduled medications  enoxaparin, 40 mg, subcutaneous, q24h  [START ON 11/15/2024] pantoprazole, 40 mg, oral, Daily before breakfast  polyethylene glycol, 17 g, oral, Daily  pramipexole, 0.5 mg, oral, TID  propranolol LA, 80 mg, oral, Daily      Continuous medications     PRN medications  PRN medications:  albuterol    Assessment    Assessment & Plan   Ms. CHAMBERS is a pleasant 68  year old woman with CVID on monthly IVIG, history of inactive mixed connective tissue disease, bronchiectasis low back pain, GERD, hiatal hernia , status post fundoplication Parkinson disease chronic pseudomonas pulmonary infection required multiple antibiotic courses, coming in today for vomiting for 2 weeks.    Principal Problem:    Nausea and vomiting     #Chemical aspiration pneumonitis  #Chronic bronchiectasis  #Common variable immunodeficiency on IVIG  Most likely aspiration pneumonitis in the setting of chronic vomiting causing aspiration of gastric contents.  Less likely pneumonia with no fever, imaging findings, more likely etiology with chronic vomiting, lab findings, and current antibiotic use.  Patient also has a history of chronic bronchiectasis and CVID where she follows with pulmonology which could be contributing to cough and imaging findings.  -Supportive care with oxygen supplementation as needed  -Patient received Unasyn in the emergency department and took her ciprofloxacin this morning.  No further indication for antibiotics given lack of fevers, oxygen requirement, leukocytosis, CVA tenderness, shortness of breath, etc.  UA not suggestive of UTI.  CT imaging with suggestion to clinically correlate for pyelonephritis however given lack of systemic signs of infection we will hold off on further antibiotics.  -Respiratory hygiene per respiratory therapy  -Patient symptomatology of recurrent projectile vomiting most likely related to her large hiatal hernia as scheduled for surgery December 4.  Unfortunately has been in contact with outpatient provider to reschedule for sooner appointment though unable to be rescheduled.  Will need supportive care until he can get surgery.    #Esophageal dysphagia  #Recurrence of hiatal hernia  #GERD  # History Niesen fundoplication  EGD findings: Food in the lower third of the esophagus.  Abnormal previous fundoplication in the esophagus; no bleeding was identified. The stomach and duodenum appeared normal. Dilated in the esophagus with Savary-Deirdre dilator from 54 Fr starting size to 60 Fr ending size. Dilation caused bleeding and mucosal tears; post-dilation bleeding was moderate; post-dilation mucosal tears were superficial. MODIFIED BARIUM: Moderate sized paraesophageal hernia. The intrathoracic portion of the stomach, measuring nearly 9 cm in width, compressed the gastroesophageal junction resulting in significant delay in esophageal emptying with recurrent intraesophageal reflux throughout the exam. There was also spontaneous gastroesophageal reflux while recumbent.  Patient has reflux surgery scheduled with, Dr. Luz on, 12/4/2024.  -reglan, as needed (patient states Reglan works better than Zofran)  -Keep food moist  -Avoid laying down within 2 hours of eating  -Eat more frequent smaller meals  -Wash food down with liquids  -Chew well and eat slowly.   -Rice, bread and pasta and potatoes are likely to get stuck  -SLP consult for patient resources and education more so plan for evaluation    #treated UTI  Patient was being treated outpatient for UTI with ciprofloxacin twice daily.  Currently asymptomatic and UA not concerning for acute UTI.  Patient hematuria has resolved and she has not had any clots in the last week, she felt that she was passing a stone and it was painful and after she passed the stone her hematuria resolved.  Can consider urology referral on discharge if symptoms return.  -Monitor for concerning symptoms  -Encourage p.o. intake of fluids    CHRONIC PROBLEMS:  # Parkinson  # Lower back pain  # Mixed connective tissue disease    Global Plan of Care  Fluid: PRN  Electrolytes: Replete electrolytes as needed. Goal K>4, Mg>2.  Nutrition: regular  DVT Prophylaxis: Enoxaparin  GI Prophylaxis:  Code Status: Full Code     Emergency Contact: Extended Emergency Contact  Information  Primary Emergency Contact: ZACARIAS CHAMBERS  Address: [address] [place] [address] [place]  Home Phone: [phone]  Work Phone: [phone]  Mobile Phone: [phone]  Relation: Spouse  Secondary Emergency Contact: SOLE BURNS  Address: [address] [place] [address] [facility]  Home Phone: [phone]  Work Phone: [phone]  Mobile Phone: [phone]  Relation: Sister    Patient seen and discussed with the attending.  To be staffed with attending.  Signature: Jelani Burns, DO  Date: November 14, 2024  This note has been transcribed using Dragon voice recognition system and there is a possibility of unintentional typing misprints.  Any information found to be copied from previous providers is done in the best interest of the patient to provide accurate, quality, and continuity of care.    Senior Resident Addendum:  Patient seen and examined independently of intern resident. The above documentation was reviewed by me and reflects my direct input. My changes are italicized.    -Berenice Campos D.O. PGY-3  [facility] Chief Resident

## 2024-11-15 ENCOUNTER — APPOINTMENT (OUTPATIENT)
Dept: CARDIOLOGY | Facility: HOSPITAL | Age: 69
End: 2024-11-15
Payer: MEDICARE

## 2024-11-15 VITALS
SYSTOLIC BLOOD PRESSURE: 103 MMHG | BODY MASS INDEX: 26.7 KG/M2 | HEIGHT: 65 IN | WEIGHT: 160.27 LBS | OXYGEN SATURATION: 94 % | TEMPERATURE: 97.2 F | RESPIRATION RATE: 18 BRPM | DIASTOLIC BLOOD PRESSURE: 52 MMHG | HEART RATE: 70 BPM

## 2024-11-15 LAB
ALBUMIN SERPL BCP-MCNC: 4 G/DL (ref 3.4–5)
ANION GAP SERPL CALC-SCNC: 14 MMOL/L (ref 10–20)
ATRIAL RATE: 63 BPM
BACTERIA UR CULT: NORMAL
BASOPHILS # BLD AUTO: 0.04 X10*3/UL (ref 0–0.1)
BASOPHILS NFR BLD AUTO: 0.4 %
BUN SERPL-MCNC: 11 MG/DL (ref 6–23)
CALCIUM SERPL-MCNC: 9.4 MG/DL (ref 8.6–10.3)
CHLORIDE SERPL-SCNC: 101 MMOL/L (ref 98–107)
CO2 SERPL-SCNC: 25 MMOL/L (ref 21–32)
CREAT SERPL-MCNC: 0.81 MG/DL (ref 0.5–1.05)
EGFRCR SERPLBLD CKD-EPI 2021: 78 ML/MIN/1.73M*2
EOSINOPHIL # BLD AUTO: 0.03 X10*3/UL (ref 0–0.7)
EOSINOPHIL NFR BLD AUTO: 0.3 %
ERYTHROCYTE [DISTWIDTH] IN BLOOD BY AUTOMATED COUNT: 13 % (ref 11.5–14.5)
GLUCOSE SERPL-MCNC: 96 MG/DL (ref 74–99)
HCT VFR BLD AUTO: 42.1 % (ref 36–46)
HGB BLD-MCNC: 13.8 G/DL (ref 12–16)
HOLD SPECIMEN: NORMAL
IMM GRANULOCYTES # BLD AUTO: 0.04 X10*3/UL (ref 0–0.7)
IMM GRANULOCYTES NFR BLD AUTO: 0.4 % (ref 0–0.9)
LYMPHOCYTES # BLD AUTO: 1.05 X10*3/UL (ref 1.2–4.8)
LYMPHOCYTES NFR BLD AUTO: 9.4 %
MAGNESIUM SERPL-MCNC: 1.73 MG/DL (ref 1.6–2.4)
MCH RBC QN AUTO: 29.9 PG (ref 26–34)
MCHC RBC AUTO-ENTMCNC: 32.8 G/DL (ref 32–36)
MCV RBC AUTO: 91 FL (ref 80–100)
MONOCYTES # BLD AUTO: 0.49 X10*3/UL (ref 0.1–1)
MONOCYTES NFR BLD AUTO: 4.4 %
NEUTROPHILS # BLD AUTO: 9.56 X10*3/UL (ref 1.2–7.7)
NEUTROPHILS NFR BLD AUTO: 85.1 %
NRBC BLD-RTO: 0 /100 WBCS (ref 0–0)
P AXIS: 36 DEGREES
P OFFSET: 198 MS
P ONSET: 140 MS
PHOSPHATE SERPL-MCNC: 3.8 MG/DL (ref 2.5–4.9)
PLATELET # BLD AUTO: 263 X10*3/UL (ref 150–450)
POTASSIUM SERPL-SCNC: 3.8 MMOL/L (ref 3.5–5.3)
PR INTERVAL: 156 MS
Q ONSET: 218 MS
QRS COUNT: 10 BEATS
QRS DURATION: 84 MS
QT INTERVAL: 420 MS
QTC CALCULATION(BAZETT): 429 MS
QTC FREDERICIA: 427 MS
R AXIS: 17 DEGREES
RBC # BLD AUTO: 4.61 X10*6/UL (ref 4–5.2)
SODIUM SERPL-SCNC: 136 MMOL/L (ref 136–145)
T AXIS: 44 DEGREES
T OFFSET: 428 MS
VENTRICULAR RATE: 63 BPM
WBC # BLD AUTO: 11.2 X10*3/UL (ref 4.4–11.3)

## 2024-11-15 PROCEDURE — 93005 ELECTROCARDIOGRAM TRACING: CPT

## 2024-11-15 PROCEDURE — 2500000001 HC RX 250 WO HCPCS SELF ADMINISTERED DRUGS (ALT 637 FOR MEDICARE OP): Performed by: STUDENT IN AN ORGANIZED HEALTH CARE EDUCATION/TRAINING PROGRAM

## 2024-11-15 PROCEDURE — 96372 THER/PROPH/DIAG INJ SC/IM: CPT

## 2024-11-15 PROCEDURE — 80069 RENAL FUNCTION PANEL: CPT

## 2024-11-15 PROCEDURE — 2500000001 HC RX 250 WO HCPCS SELF ADMINISTERED DRUGS (ALT 637 FOR MEDICARE OP)

## 2024-11-15 PROCEDURE — 83735 ASSAY OF MAGNESIUM: CPT

## 2024-11-15 PROCEDURE — 92610 EVALUATE SWALLOWING FUNCTION: CPT | Mod: GN | Performed by: SPEECH-LANGUAGE PATHOLOGIST

## 2024-11-15 PROCEDURE — 36415 COLL VENOUS BLD VENIPUNCTURE: CPT

## 2024-11-15 PROCEDURE — 85025 COMPLETE CBC W/AUTO DIFF WBC: CPT

## 2024-11-15 PROCEDURE — 99235 HOSP IP/OBS SAME DATE MOD 70: CPT

## 2024-11-15 PROCEDURE — G0378 HOSPITAL OBSERVATION PER HR: HCPCS

## 2024-11-15 PROCEDURE — 2500000004 HC RX 250 GENERAL PHARMACY W/ HCPCS (ALT 636 FOR OP/ED)

## 2024-11-15 RX ORDER — METOCLOPRAMIDE 10 MG/1
10 TABLET ORAL EVERY 6 HOURS PRN
Qty: 56 TABLET | Refills: 0 | Status: SHIPPED | OUTPATIENT
Start: 2024-11-15 | End: 2024-11-29

## 2024-11-15 RX ORDER — PANTOPRAZOLE SODIUM 40 MG/1
40 TABLET, DELAYED RELEASE ORAL
Status: DISCONTINUED | OUTPATIENT
Start: 2024-11-15 | End: 2024-11-15 | Stop reason: HOSPADM

## 2024-11-15 RX ORDER — ACETAMINOPHEN 325 MG/1
650 TABLET ORAL ONCE
Status: DISCONTINUED | OUTPATIENT
Start: 2024-11-15 | End: 2024-11-15 | Stop reason: HOSPADM

## 2024-11-15 RX ORDER — OMEPRAZOLE 40 MG/1
40 CAPSULE, DELAYED RELEASE ORAL DAILY
Qty: 30 CAPSULE | Refills: 0 | Status: SHIPPED | OUTPATIENT
Start: 2024-11-15 | End: 2024-12-15

## 2024-11-15 RX ORDER — SUMATRIPTAN 50 MG/1
50 TABLET, FILM COATED ORAL ONCE AS NEEDED
Status: COMPLETED | OUTPATIENT
Start: 2024-11-15 | End: 2024-11-15

## 2024-11-15 RX ORDER — METOCLOPRAMIDE 10 MG/1
10 TABLET ORAL EVERY 6 HOURS PRN
Status: DISCONTINUED | OUTPATIENT
Start: 2024-11-15 | End: 2024-11-15 | Stop reason: HOSPADM

## 2024-11-15 ASSESSMENT — PAIN - FUNCTIONAL ASSESSMENT: PAIN_FUNCTIONAL_ASSESSMENT: 0-10

## 2024-11-15 ASSESSMENT — COGNITIVE AND FUNCTIONAL STATUS - GENERAL
DAILY ACTIVITIY SCORE: 24
MOBILITY SCORE: 24

## 2024-11-15 ASSESSMENT — ACTIVITIES OF DAILY LIVING (ADL): LACK_OF_TRANSPORTATION: NO

## 2024-11-15 ASSESSMENT — PAIN SCALES - GENERAL
PAINLEVEL_OUTOF10: 7
PAINLEVEL_OUTOF10: 0 - NO PAIN
PAINLEVEL_OUTOF10: 4

## 2024-11-15 NOTE — PROGRESS NOTES
11/15/24 [id number]   Discharge Planning   Living Arrangements Spouse/significant other   Support Systems Spouse/significant other   Assistance Needed None   Type of Residence Private residence   Home or Post Acute Services None   Expected Discharge Disposition Home   Does the patient need discharge transport arranged? No   Financial Resource Strain   How hard is it for you to pay for the very basics like food, housing, medical care, and heating? Not hard   Housing Stability   In the last 12 months, was there a time when you were not able to pay the mortgage or rent on time? N   In the past 12 months, how many times have you moved where you were living? 1   At any time in the past 12 months, were you homeless or living in a shelter (including now)? N   Transportation Needs   In the past 12 months, has lack of transportation kept you from medical appointments or from getting medications? no   In the past 12 months, has lack of transportation kept you from meetings, work, or from getting things needed for daily living? No     Met with patient at the bedside. Confirmed address and contacts. Patient lives at home with her . States she is independent without device at home. PCP is Dr Alvarado-lynne regularly. Denies issues obtaining or affording medications. Denies any dc needs. Plans to return home at [place].

## 2024-11-15 NOTE — PROGRESS NOTES
"Speech-Language Pathology    [facility] Adult Inpatient Speech-Language Pathology Clinical Swallow Evaluation    Patient Name: Oneyda Parmar  MRN: [id number]  Today's Date: 11/15/2024   Time Calculation  Start Time: 1030  Stop Time: 1050  Time Calculation (min): 20 min         Current Problem:   1. Aspiration pneumonia of right lower lobe due to vomit (Multi)        2. Nausea and vomiting, unspecified vomiting type  metoclopramide (Reglan) 10 mg tablet      3. Gastroesophageal reflux disease without esophagitis  omeprazole (PriLOSEC) 40 mg DR capsule            Recommendations:  Risk for Aspiration: Yes (patient is at risk for aspiration of frequent regurgitated solids/liquids)  Solid Diet Recommendations : Regular (IDDSI Level 7)  Liquid Diet Recommendations: Thin (IDDSI Level 0)  Compensatory Swallowing Strategies: Upright 90 degrees as possible for all oral intake, Remain upright for 20-30 minutes after meals, Single sips, Small bites/sips, Eat/feed slowly, Other (Comment) (STRICT oral care)  Medication Administration Recommendations: Whole, With Liquid, Other (Comment) (or as best tolerated)      Assessment:  Assessment Results:   Patient presents with suspected functional oropharyngeal swallow upon completion of clinical swallow evaluation at bedside this date. Examination of oral mechanism was unremarkable. Patient did not \"pass\" [place] Swallow Protocol (3 oz water challenge) due to patient feeling a globus sensation and needing to stop as she was worried about emesis. Patient also experienced a globus sensation with puree solids, and soft solids. This sensation cleared when patient was able to belch. Patient experienced regurgitation after cup sip of thin water as well. Patient tolerated PO trials of ice chips and regular solids without overt s/s of aspiration. Of note, when talking to the patient and her  after clinical swallow evaluation, patient experienced emesis of the solids/liquids she had consumed " "during the clinical swallow evaluation trials.     Patient was seen for MBSS on 10/1/2024 with results of concerns for esophageal dysphagia due to hernia, however no concerns for oropharyngeal dysphagia were observed.     Per this assessment, patient is appropriate to continue baseline diet with standard aspiration precautions. Patient to continue to follow-up with GI regarding esophageal deficits.     ST to sign off, but remains available upon re-consultation should new concerns arise.    Prognosis: Excellent  Treatment Provided: No  Medical Staff Made Aware: Yes  Strengths: Cognition, Motivation, Family/Caregiver Support  Barriers: Comorbidities      Plan:  Inpatient/Swing Bed or Outpatient: Inpatient  Diet Recommendations: Solid  Solid Consistency: Regular (IDDSI Level 7)  Liquid Consistency: Thin (IDDSI Level 0)  Discussed POC: Patient, Caregiver/family, Nursing  Discussed Risks/Benefits: Yes, Patient, Caregiver/Family, Nursing  Patient/Caregiver Agreeable: Yes      Subjective   Upon arrival patient notes having just experienced emesis of her breakfast as she is unable to keep her food down. Patient continues to note consistent spells of emesis for the past two weeks. She states that she has not been able to eat solid foods in this time without emesis, and as of yesterday she cannot keep liquids down either. Patient notes every time she eats, she experiences a gurgling sensation followed by burping, which eventually leads to regurgitation or emesis. She notes recently having occurrences of \"projectile vomiting\" when this sequence of events occurs as well. She notes not knowing when it is going to happen, but when it does, it happens fast.     General Visit Information:  Patient Class: Inpatient  Living Environment: Home  Arrival: Independent  Caregiver Feedback: RN reports patient had some of an omelet this morning, but is unable to keep it down. Patient has not been able to digest anything while she has been " "here.  Reason for Referral: Suspected Oral or Pharyngeal Dysphagia  Referred By: Berenice Campos DO  Prior Level of Function: WFL  Total Number of Visits : 1  Prior to Session Communication: Bedside nurse  Reviewed Procedures and Risks: Yes  Date of Onset: 09/16/24  Date of Order: 11/14/24  [place] Diet: Regular solids and thin liquids  Current Diet : Regular solids and thin liquids  Dysphagia Diagnosis: Within functional limits    Objective       Baseline Assessment:  Respiratory Status: Room air  History of Intubation: No  Behavior/Cognition: Alert, Cooperative, Pleasant mood  Vision: Functional for self-feeding  Patient Positioning: Upright in Bed  Baseline Vocal Quality: Normal  Volitional Cough: Weak, Other (Comment) (Followed by emesis)  Volitional Swallow: Within Functional Limits    Relevant Imaging:    Abdomen pelvis CT (11/14/2024): \"IMPRESSION:  Mild enhancement of the left uroepithelium without significant  hydronephrosis.  No renal or ureteral calculi.  Please correlate for  polynephritis.  Moderate to large hiatal hernia with ectasia the distal esophagus and  luminal fluid.  Airspace opacity right lower lobe suggesting acute pneumonia.  This  correlate for aspiration..\"    Esophagram FL (10//7/2024): \"IMPRESSION:  Moderate sized paraesophageal hernia. The intrathoracic portion of  the stomach, measuring nearly 9 cm in width, compressed the  gastroesophageal junction resulting in significant delay in  esophageal emptying with recurrent intraesophageal reflux throughout  the exam. There was also spontaneous gastroesophageal reflux while  recumbent.\"    MBSS FL (10/1/2024): \"SLP Impressions with Severity Rating:  Pt presents with functional oropharyngeal swallow and moderate to  severe esophageal dysphagia upon completion of modified barium  swallow study this date. Swallowing physiology is detailed above.  Impairments most impacting swallowing safety and efficiency include  decreased tongue base " "retraction.  Patient did not demonstrate  penetration or aspiration for any consistency. Patient demonstrated  trace oral and pharyngeal residue that was reduced with repeat  swallows.      In the A-P view, patient demonstrated minimal esophageal clearance of  contrast with retrograde flow below the pharyngoesophageal segment.  Of note: The A-P bolus follow-through is not intended to be utilized  as a diagnostic assessment of the esophagus, rather a tool to observe  the biomechanical aspects of the swallow continuum and to inform the  need for further evaluation by medical specialists, as applicable.      Per the results of this assessment, patient is appropriate to  continue baseline diet, with additional recommendations as noted  above. No further skilled ST services are warranted per this  assessment. Recommend continuing with GI follow-up as planned.\"    Pain:  Pain Assessment  Pain Assessment: 0-10  0-10 (Numeric) Pain Score: 0 - No pain (however after vomiting, patient notes \"gastric acid burns her throat.\")    Oral/Motor Assessment:  Oral Hygiene: Excellent  Dentition: Adequate/Natural  Oral Motor: Within Functional Limits      Consistencies Trialed:  Consistencies Trialed: Yes  Consistencies Trialed: Ice Chips, Thin (IDDSI Level 0) - Straw, Thin (IDDSI Level 0) - Cup, Pureed/extremely thick (IDDSI Level 4), Soft & bite sized/chopped (IDDSI Level 6), Regular (IDDSI Level 7)      Clinical Observations:  Patient Positioning: Upright in Bed  Management of Oral Secretions: Adequate  Was The 3 oz Swallow Protocol Completed: No  Other Signs/Symptoms of Difficulty with Feeding: Emesis, Reported Globus Sensation  Reported Globus Sensation: Thin (IDDSI Level 0) - Straw, Pureed/Extremely Thick (IDDSI Level 4), Soft & Bite Sized/Chopped (IDDSI Level 6)  Clinical Observation Comment: a couple minutes after clinical bedside evaluation, patient presented with emesis of the food she had just swallowed during the " evaluation.    Inpatient:    Education:  Learner patient; patient's    Barriers to Learning none   Method verbal   Education - Topic ST provided patient education regarding role of ST, purpose of assessment, and clinical impressions. Patient verbalized full comprehension, consistent with cognitive status. ST further coordinated with RN regarding recommendations and precautions per this assessment, with RN verbalizing understanding.     Outcome    Verbalized understanding and agreement   \

## 2024-11-15 NOTE — DISCHARGE INSTRUCTIONS
Please call your primary care provider Dr. Alvarado and schedule a follow up appointment in 2-3 days.     Please take all of your medications as directed.     Medication changes:  Omeprazole increased to twice daily    Stop taking:  Ciprofloxacin as it does not look like you had a true urinary tract infection.  It is likely that he had a kidney stone causing the bloody urine    New medications:  Reglan as needed for nausea/vomiting    If you have any concerning symptoms, or worsening symptoms please call or return, such as chest pain, shortness of breath, new onset confusion, loss of sensation, or fever >103F.    Thank you for allowing us to participate in your health care.    -[facility] [place] Inpatient Medicine Teaching Service.

## 2024-11-15 NOTE — CARE PLAN
The patient's goals for the shift include      The clinical goals for the shift include Patient will be free from nausea/emesis by the end of this shift.

## 2024-11-15 NOTE — DISCHARGE SUMMARY
Discharge Diagnosis  Nausea and vomiting    Issues Requiring Follow-Up  Follow-up with surgical appointment on 12/4/2024    Discharge Meds     Medication List      START taking these medications     metoclopramide 10 mg tablet; Commonly known as: Reglan; Take 1 tablet   (10 mg) by mouth every 6 hours if needed (Nausea/Vomiting) for up to 14   days.     CHANGE how you take these medications     rizatriptan 10 mg tablet; Commonly known as: Maxalt; One po at onset of   migraine; can repeat in 2 hrs;  max 20mg daily; What changed: how much to   take, how to take this, when to take this, reasons to take this,   additional instructions     CONTINUE taking these medications     albuterol 90 mcg/actuation inhaler   cholecalciferol (vitamin D3) 25 mcg (1,000 unit) chewable gummy; Take 1   each (1,000 Units) by mouth once daily.   Gammagard Liquid infusion; Generic drug: immune globulin (human)   ibuprofen 600 mg tablet; Take 1 tablet (600 mg) by mouth every 8 hours   if needed for mild pain (1 - 3).   omeprazole 40 mg DR capsule; Commonly known as: PriLOSEC; Take 1 capsule   (40 mg) by mouth once daily. Do not crush or chew.   pramipexole 0.5 mg tablet; Commonly known as: Mirapex   propranolol LA 80 mg 24 hr capsule; Commonly known as: Inderal LA; Take   1 capsule (80 mg) by mouth once daily.     STOP taking these medications     ciprofloxacin 500 mg tablet; Commonly known as: Cipro     ASK your doctor about these medications     carbidopa-levodopa  mg tablet; Commonly known as: Sinemet; Take 1   tablet by mouth 3 times a day. Start with half a tablet three times daily   for one week then go to one full tablet after that.   tiotropium 18 mcg inhalation capsule; Commonly known as: Spiriva with   HandiHaler; Place 1 capsule (18 mcg) into inhaler and inhale once daily.       Test Results Pending At Discharge  Pending Labs       Order Current Status    Urine Culture In process            Hospital Course  Ms. 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  She presented to [facility] ED with intractable vomiting (projectile) for the past 2 weeks.  She had been experiencing difficulty eating, having trouble keeping food down including liquids.  She endorsed sensation of food getting stuck in her throat and chest.  Patient also stated that she has been having nausea, coughing, and burping.  She denied chest pain, shortness of breath, fevers, chills, and abdominal pain on presentation.  Notably patient had been treated for UTI with ciprofloxacin twice daily for 10 days.  Regarding this, patient stated that she has not been having any hematuria for the past week after passing blood clots (told that she had passed a renal stone).    Upon arrival at the ED, patient was hemodynamically and vitally stable.  CMP was unremarkable.  CBC was remarkable for elevated WBC 12.7, elevated ANC 10.7.  UA was positive for trace hematuria, negative nitrates, and 25 leukocyte esterase.  EKG done in the ED showed normal sinus rhythm.  CT abdomen/pelvis was also done in the ED showed mild enhancement of the left uroepithelium without significant hydronephrosis, no renal or ureteral calculi were identified.  It also showed moderate to large hiatal hernia with ectasia of the distal esophagus and luminal fluid.  There was also airspace opacity in the right lower lobe suggesting acute pneumonia.  Unasyn was started in the ED (notably patient also took her dose of ciprofloxacin in the morning prior to arrival at the ED).  Patient was subsequently admitted to internal medicine for medical management of projectile vomiting.    Patient does have a surgical  appointment on 12/4/2024 for repair of hiatal hernia.  She was unable to reschedule for a sooner appointment.  While on the floor, patient has vomited multiple times and has had nausea.  Nausea was treated with Reglan (patient stated Reglan has been working better than Zofran).  Reglan has shown improvement in nausea, however, patient has still been unable to keep food down stating that food is getting stuck in her chest.  Reiterated need for chewing well and eating slowly with frequent sips of liquids to moisten food and ease in swallowing.  EKG was ordered to assess for QTc in order to discharge with prescription for Reglan until surgical date on 12/4/2024.  Additionally, PPI twice daily was ordered in order to ease in GERD symptoms.  Patient was subsequently deemed medically clear for discharge.    Pertinent Physical Exam At Time of Discharge  Physical Exam  Vitals and nursing note reviewed.   Constitutional:       General: She is not in acute distress.     Appearance: She is not toxic-appearing.   HENT:      Head: Normocephalic and atraumatic.      Mouth/Throat:      Mouth: Mucous membranes are moist.   Cardiovascular:      Rate and Rhythm: Normal rate and regular rhythm.      Pulses: Normal pulses.      Heart sounds: Normal heart sounds. No murmur heard.     No friction rub. No gallop.   Pulmonary:      Effort: Pulmonary effort is normal. No respiratory distress.      Breath sounds: Normal breath sounds. No wheezing, rhonchi or rales.   Abdominal:      General: Abdomen is flat. Bowel sounds are normal. There is no distension.      Palpations: Abdomen is soft.      Tenderness: There is abdominal tenderness (Mild) in the right lower quadrant.   Musculoskeletal:         General: No swelling or tenderness. Normal range of motion.      Cervical back: Normal range of motion.   Skin:     General: Skin is warm and dry.      Capillary Refill: Capillary refill takes 2 to 3 seconds.      Coloration: Skin is not jaundiced.    Neurological:      General: No focal deficit present.      Mental Status: She is alert and oriented to person, place, and time. Mental status is at baseline.   Psychiatric:         Mood and Affect: Mood normal.         Behavior: Behavior normal.         Thought Content: Thought content normal.         Judgment: Judgment normal.         Outpatient Follow-Up  Future Appointments   Date Time Provider Department Center   11/19/2024  9:40 AM Desmond Alex MD [id number] [facility]   12/9/2024 12:00 PM Candido Will MD [id number] [facility]   12/18/2024  1:45 PM Paty Luz MD [facility] [id number] [facility]   12/19/2024 10:00 AM Lauren Mcdaniel RD,  [id number] [place]   1/15/2025 12:45 PM Paty Luz MD [facility] [id number] [facility]   1/15/2025  3:30 PM Lauren Mcdaniel RD,  [id number] [facility]   1/20/2025  3:30 PM Miguel Alvarado DO [id number] [place]   3/5/2025  1:30 PM Debbie Alba, APRN-CNP [id number] [facility]   3/5/2025  2:30 PM Lauren Mcdaniel RD,  [id number] [facility]   4/22/2025  8:40 AM Carly Metzger MD [id number] [place]   4/28/2025  9:00 AM Mary Duenas MD [id number] [place]       Patient was seen and discussed with senior resident and attending physician, Dr. Mane.  Tran Campos DO, MS4  Internal Medicine    I saw this patient with the above medical student and performed my own History and Physical Examination.   My Subjective and Objective findings are reflected in the above documentation.  The Assessment and Plan reflect my input. My Edits are included in the above documentation.    Tran Campos DO  Internal Medicine PGY3

## 2024-11-15 NOTE — HOSPITAL COURSE
Ms. 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  She presented to [facility] ED with intractable vomiting (projectile) for the past 2 weeks.  She had been experiencing difficulty eating, having trouble keeping food down including liquids.  She endorsed sensation of food getting stuck in her throat and chest.  Patient also stated that she has been having nausea, coughing, and burping.  She denied chest pain, shortness of breath, fevers, chills, and abdominal pain on presentation.  Notably patient had been treated for UTI with ciprofloxacin twice daily for 10 days.  Regarding this, patient stated that she has not been having any hematuria for the past week after passing blood clots (told that she had passed a renal stone).    Upon arrival at the ED, patient was hemodynamically and vitally stable.  CMP was unremarkable.  CBC was remarkable for elevated WBC 12.7, elevated ANC 10.7.  UA was positive for trace hematuria, negative nitrates, and 25 leukocyte esterase.  EKG done in the ED showed normal sinus rhythm.  CT abdomen/pelvis was also done in the ED showed mild enhancement of the left uroepithelium without significant hydronephrosis, no renal or ureteral calculi were identified.  It also showed moderate to large hiatal hernia with ectasia of the distal esophagus and luminal fluid.  There was also airspace opacity in the right lower lobe suggesting acute pneumonia.  Unasyn was started in the ED (notably patient also took her dose of ciprofloxacin in the morning prior to arrival at the ED).  Patient was subsequently admitted to internal medicine for medical management of projectile vomiting.    Patient  does have a surgical appointment on 12/4/2024 for repair of hiatal hernia.  She was unable to reschedule for a sooner appointment.  While on the floor, patient has vomited multiple times and has had nausea.  Nausea was treated with Reglan (patient stated Reglan has been working better than Zofran).  Reglan has shown improvement in nausea, however, patient has still been unable to keep food down stating that food is getting stuck in her chest.  Reiterated need for chewing well and eating slowly with frequent sips of liquids to moisten food and ease in swallowing.  EKG was ordered to assess for QTc in order to discharge with prescription for Reglan until surgical date on 12/4/2024.  Additionally, PPI twice daily was ordered in order to ease in GERD symptoms.  Patient was subsequently deemed medically clear for discharge.

## 2024-11-15 NOTE — CARE PLAN
The clinical goals for the shift include Patient will be free from nausea/emesis by the end of this shift. Nausea and emesis the first few hours of the shift. Vomited chunks of undigested food along with digested food/bile. Pt did fall asleep & said she slept better than she has in days. Denied nausea this morning,  but had migraine- medicated per MAR      Problem: Discharge Planning  Goal: Discharge to home or other facility with appropriate resources  Outcome: Progressing     Problem: Pain - Adult  Goal: Verbalizes/displays adequate comfort level or baseline comfort level  Outcome: Progressing     Problem: Safety - Adult  Goal: Free from fall injury  Outcome: Progressing     Problem: Chronic Conditions and Co-morbidities  Goal: Patient's chronic conditions and co-morbidity symptoms are monitored and maintained or improved  Outcome: Progressing     Problem: Pain  Goal: Takes deep breaths with improved pain control throughout the shift  Outcome: Progressing  Goal: Turns in bed with improved pain control throughout the shift  Outcome: Progressing  Goal: Walks with improved pain control throughout the shift  Outcome: Progressing  Goal: Performs ADL's with improved pain control throughout shift  Outcome: Progressing  Goal: Participates in PT with improved pain control throughout the shift  Outcome: Progressing  Goal: Free from opioid side effects throughout the shift  Outcome: Progressing  Goal: Free from acute confusion related to pain meds throughout the shift  Outcome: Progressing

## 2024-11-18 ENCOUNTER — TELEPHONE (OUTPATIENT)
Dept: PRIMARY CARE | Facility: CLINIC | Age: 69
End: 2024-11-18
Payer: MEDICARE

## 2024-11-18 ENCOUNTER — PATIENT OUTREACH (OUTPATIENT)
Dept: PRIMARY CARE | Facility: CLINIC | Age: 69
End: 2024-11-18
Payer: MEDICARE

## 2024-11-18 DIAGNOSIS — K44.9 PARAESOPHAGEAL HERNIA WITH GASTROESOPHAGEAL REFLUX: ICD-10-CM

## 2024-11-18 DIAGNOSIS — R11.0 NAUSEA: Primary | ICD-10-CM

## 2024-11-18 DIAGNOSIS — K21.9 PARAESOPHAGEAL HERNIA WITH GASTROESOPHAGEAL REFLUX: ICD-10-CM

## 2024-11-18 RX ORDER — ONDANSETRON 8 MG/1
8 TABLET, ORALLY DISINTEGRATING ORAL EVERY 8 HOURS PRN
Qty: 30 TABLET | Refills: 0 | Status: SHIPPED | OUTPATIENT
Start: 2024-11-18 | End: 2024-11-28

## 2024-11-18 NOTE — PROGRESS NOTES
Discharge Facility:[facility]  Discharge Diagnosis:Nausea and Vomiting  Admission Date:11/14/24  Discharge Date: 11/15/24    PCP Appointment Date:Message sent to office  Specialist Appointment Date: Infectious Disease 11/19/24, Neurology 12/9/24. General Surgery 12/4/24  Hospital Encounter and Summary Linked: Yes  See discharge assessment below for further details    Medications  Medications reviewed with patient/caregiver?: Yes (11/18/2024 10:00 AM)  Is the patient having any side effects they believe may be caused by any medication additions or changes?: No (11/18/2024 10:00 AM)  Does the patient have all medications ordered at discharge?: Yes (11/18/2024 10:00 AM)  Care Management Interventions: Provided patient education (11/18/2024 10:00 AM)  Prescription Comments: Reglan (11/18/2024 10:00 AM)  Is the patient taking all medications as directed (includes completed medication regime)?: Yes (11/18/2024 10:00 AM)  Care Management Interventions: Provided patient education (11/18/2024 10:00 AM)  Medication Comments: Stop Cipro (11/18/2024 10:00 AM)    Appointments  Does the patient have a primary care provider?: Yes (11/18/2024 10:00 AM)  Care Management Interventions: Advised patient to make appointment (11/18/2024 10:00 AM)  Has the patient kept scheduled appointments due by today?: Yes (11/18/2024 10:00 AM)  Care Management Interventions: Advised to schedule with specialist (11/18/2024 10:00 AM)    Self Management  Has home health visited the patient within 72 hours of discharge?: Not applicable (11/18/2024 10:00 AM)  What Durable Medical Equipment (DME) was ordered?: N/A (11/18/2024 10:00 AM)    Patient Teaching  Does the patient have access to their discharge instructions?: Yes (11/18/2024 10:00 AM)  Care Management Interventions: Reviewed instructions with patient (11/18/2024 10:00 AM)  What is the patient's perception of their health status since discharge?: Same (11/18/2024 10:00 AM)  Is the patient/caregiver  able to teach back the hierarchy of who to call/visit for symptoms/problems? PCP, Specialist, Home Health nurse, Urgent Care, ED, 911: Yes (11/18/2024 10:00 AM)    Wrap Up  Wrap Up Additional Comments: Patient is not feeling any better. She can't sleep. When bending over to try to get comfortable in bed she starts to vomit. She says she has loss 7lbs. She states she can't have her surgery until 12/4/24. Reglan is not helping her at home. Message sent to inpatient doctor, pcp, and surgery. Patient requesting a virtual pc follow up.Message sent to office. (11/18/2024 10:00 AM)

## 2024-11-19 ENCOUNTER — APPOINTMENT (OUTPATIENT)
Dept: INFECTIOUS DISEASES | Facility: CLINIC | Age: 69
End: 2024-11-19
Payer: MEDICARE

## 2024-11-19 NOTE — TELEPHONE ENCOUNTER
Pt is calling and would like to know if you heard anything back from her surgeon or gastroenterologist? Pt states that she can not stay like this for 2 more weeks.

## 2024-11-20 ENCOUNTER — APPOINTMENT (OUTPATIENT)
Dept: CARDIOLOGY | Facility: HOSPITAL | Age: 69
DRG: 327 | End: 2024-11-20
Payer: MEDICARE

## 2024-11-20 ENCOUNTER — APPOINTMENT (OUTPATIENT)
Dept: RADIOLOGY | Facility: HOSPITAL | Age: 69
DRG: 327 | End: 2024-11-20
Payer: MEDICARE

## 2024-11-20 ENCOUNTER — ANESTHESIA (OUTPATIENT)
Dept: OPERATING ROOM | Facility: HOSPITAL | Age: 69
End: 2024-11-20
Payer: MEDICARE

## 2024-11-20 ENCOUNTER — ANESTHESIA EVENT (OUTPATIENT)
Dept: OPERATING ROOM | Facility: HOSPITAL | Age: 69
End: 2024-11-20
Payer: MEDICARE

## 2024-11-20 ENCOUNTER — HOSPITAL ENCOUNTER (INPATIENT)
Facility: HOSPITAL | Age: 69
LOS: 4 days | Discharge: HOME | DRG: 327 | End: 2024-11-24
Attending: EMERGENCY MEDICINE | Admitting: SURGERY
Payer: MEDICARE

## 2024-11-20 DIAGNOSIS — K21.9 PARAESOPHAGEAL HERNIA WITH GASTROESOPHAGEAL REFLUX: ICD-10-CM

## 2024-11-20 DIAGNOSIS — K44.9 PARAESOPHAGEAL HERNIA WITH GASTROESOPHAGEAL REFLUX: ICD-10-CM

## 2024-11-20 DIAGNOSIS — R11.2 NAUSEA AND VOMITING, UNSPECIFIED VOMITING TYPE: Primary | ICD-10-CM

## 2024-11-20 PROBLEM — J93.9 PNEUMOTHORAX: Status: ACTIVE | Noted: 2024-11-20

## 2024-11-20 PROBLEM — N28.9 RENAL INSUFFICIENCY: Status: ACTIVE | Noted: 2024-11-20

## 2024-11-20 LAB
ABO GROUP (TYPE) IN BLOOD: NORMAL
ALBUMIN SERPL BCP-MCNC: 4.4 G/DL (ref 3.4–5)
ALP SERPL-CCNC: 129 U/L (ref 33–136)
ALT SERPL W P-5'-P-CCNC: 16 U/L (ref 7–45)
ANION GAP SERPL CALC-SCNC: 15 MMOL/L (ref 10–20)
ANTIBODY SCREEN: NORMAL
APTT PPP: 32 SECONDS (ref 27–38)
AST SERPL W P-5'-P-CCNC: 17 U/L (ref 9–39)
BASOPHILS # BLD AUTO: 0.07 X10*3/UL (ref 0–0.1)
BASOPHILS NFR BLD AUTO: 0.4 %
BILIRUB SERPL-MCNC: 1.3 MG/DL (ref 0–1.2)
BUN SERPL-MCNC: 19 MG/DL (ref 6–23)
CALCIUM SERPL-MCNC: 10 MG/DL (ref 8.6–10.3)
CHLORIDE SERPL-SCNC: 97 MMOL/L (ref 98–107)
CO2 SERPL-SCNC: 28 MMOL/L (ref 21–32)
CREAT SERPL-MCNC: 1.31 MG/DL (ref 0.5–1.05)
EGFRCR SERPLBLD CKD-EPI 2021: 44 ML/MIN/1.73M*2
EOSINOPHIL # BLD AUTO: 0.01 X10*3/UL (ref 0–0.7)
EOSINOPHIL NFR BLD AUTO: 0.1 %
ERYTHROCYTE [DISTWIDTH] IN BLOOD BY AUTOMATED COUNT: 12.3 % (ref 11.5–14.5)
GLUCOSE SERPL-MCNC: 114 MG/DL (ref 74–99)
HCT VFR BLD AUTO: 41.8 % (ref 36–46)
HGB BLD-MCNC: 14.2 G/DL (ref 12–16)
IMM GRANULOCYTES # BLD AUTO: 0.12 X10*3/UL (ref 0–0.7)
IMM GRANULOCYTES NFR BLD AUTO: 0.6 % (ref 0–0.9)
INR PPP: 1.1 (ref 0.9–1.1)
LYMPHOCYTES # BLD AUTO: 1.23 X10*3/UL (ref 1.2–4.8)
LYMPHOCYTES NFR BLD AUTO: 6.4 %
MCH RBC QN AUTO: 30.5 PG (ref 26–34)
MCHC RBC AUTO-ENTMCNC: 34 G/DL (ref 32–36)
MCV RBC AUTO: 90 FL (ref 80–100)
MONOCYTES # BLD AUTO: 0.88 X10*3/UL (ref 0.1–1)
MONOCYTES NFR BLD AUTO: 4.6 %
NEUTROPHILS # BLD AUTO: 17.02 X10*3/UL (ref 1.2–7.7)
NEUTROPHILS NFR BLD AUTO: 87.9 %
NRBC BLD-RTO: 0 /100 WBCS (ref 0–0)
PLATELET # BLD AUTO: 322 X10*3/UL (ref 150–450)
POTASSIUM SERPL-SCNC: 4 MMOL/L (ref 3.5–5.3)
PROT SERPL-MCNC: 8.3 G/DL (ref 6.4–8.2)
PROTHROMBIN TIME: 12.8 SECONDS (ref 9.8–12.8)
RBC # BLD AUTO: 4.66 X10*6/UL (ref 4–5.2)
RH FACTOR (ANTIGEN D): NORMAL
SODIUM SERPL-SCNC: 136 MMOL/L (ref 136–145)
WBC # BLD AUTO: 19.3 X10*3/UL (ref 4.4–11.3)

## 2024-11-20 PROCEDURE — A43281 PR LAP, REPAIR PARAESOPHAGEAL HERNIA, INCL FUNDOPLASTY W/O MESH

## 2024-11-20 PROCEDURE — 93005 ELECTROCARDIOGRAM TRACING: CPT

## 2024-11-20 PROCEDURE — 2500000004 HC RX 250 GENERAL PHARMACY W/ HCPCS (ALT 636 FOR OP/ED): Performed by: STUDENT IN AN ORGANIZED HEALTH CARE EDUCATION/TRAINING PROGRAM

## 2024-11-20 PROCEDURE — 99285 EMERGENCY DEPT VISIT HI MDM: CPT | Mod: 25

## 2024-11-20 PROCEDURE — 3700000001 HC GENERAL ANESTHESIA TIME - INITIAL BASE CHARGE: Performed by: SURGERY

## 2024-11-20 PROCEDURE — 3600000004 HC OR TIME - INITIAL BASE CHARGE - PROCEDURE LEVEL FOUR: Performed by: SURGERY

## 2024-11-20 PROCEDURE — 0DJ08ZZ INSPECTION OF UPPER INTESTINAL TRACT, VIA NATURAL OR ARTIFICIAL OPENING ENDOSCOPIC: ICD-10-PCS | Performed by: SURGERY

## 2024-11-20 PROCEDURE — 74018 RADEX ABDOMEN 1 VIEW: CPT

## 2024-11-20 PROCEDURE — 85025 COMPLETE CBC W/AUTO DIFF WBC: CPT | Performed by: EMERGENCY MEDICINE

## 2024-11-20 PROCEDURE — 80053 COMPREHEN METABOLIC PANEL: CPT | Performed by: EMERGENCY MEDICINE

## 2024-11-20 PROCEDURE — 2500000001 HC RX 250 WO HCPCS SELF ADMINISTERED DRUGS (ALT 637 FOR MEDICARE OP)

## 2024-11-20 PROCEDURE — 74018 RADEX ABDOMEN 1 VIEW: CPT | Performed by: RADIOLOGY

## 2024-11-20 PROCEDURE — 0751T DGTZ GLS MCRSCP SLD LEVEL II: CPT | Mod: TC,PARLAB | Performed by: STUDENT IN AN ORGANIZED HEALTH CARE EDUCATION/TRAINING PROGRAM

## 2024-11-20 PROCEDURE — 2500000004 HC RX 250 GENERAL PHARMACY W/ HCPCS (ALT 636 FOR OP/ED): Performed by: ANESTHESIOLOGY

## 2024-11-20 PROCEDURE — 0W9B30Z DRAINAGE OF LEFT PLEURAL CAVITY WITH DRAINAGE DEVICE, PERCUTANEOUS APPROACH: ICD-10-PCS | Performed by: SURGERY

## 2024-11-20 PROCEDURE — 86901 BLOOD TYPING SEROLOGIC RH(D): CPT | Performed by: EMERGENCY MEDICINE

## 2024-11-20 PROCEDURE — 43281 LAP PARAESOPHAG HERN REPAIR: CPT | Performed by: STUDENT IN AN ORGANIZED HEALTH CARE EDUCATION/TRAINING PROGRAM

## 2024-11-20 PROCEDURE — 43281 LAP PARAESOPHAG HERN REPAIR: CPT | Performed by: SURGERY

## 2024-11-20 PROCEDURE — 2500000004 HC RX 250 GENERAL PHARMACY W/ HCPCS (ALT 636 FOR OP/ED): Performed by: EMERGENCY MEDICINE

## 2024-11-20 PROCEDURE — A43281 PR LAP, REPAIR PARAESOPHAGEAL HERNIA, INCL FUNDOPLASTY W/O MESH: Performed by: ANESTHESIOLOGY

## 2024-11-20 PROCEDURE — P9045 ALBUMIN (HUMAN), 5%, 250 ML: HCPCS | Mod: JZ

## 2024-11-20 PROCEDURE — 99222 1ST HOSP IP/OBS MODERATE 55: CPT | Performed by: STUDENT IN AN ORGANIZED HEALTH CARE EDUCATION/TRAINING PROGRAM

## 2024-11-20 PROCEDURE — 96374 THER/PROPH/DIAG INJ IV PUSH: CPT

## 2024-11-20 PROCEDURE — 96375 TX/PRO/DX INJ NEW DRUG ADDON: CPT

## 2024-11-20 PROCEDURE — 3700000002 HC GENERAL ANESTHESIA TIME - EACH INCREMENTAL 1 MINUTE: Performed by: SURGERY

## 2024-11-20 PROCEDURE — 3600000009 HC OR TIME - EACH INCREMENTAL 1 MINUTE - PROCEDURE LEVEL FOUR: Performed by: SURGERY

## 2024-11-20 PROCEDURE — 71046 X-RAY EXAM CHEST 2 VIEWS: CPT | Performed by: RADIOLOGY

## 2024-11-20 PROCEDURE — 2500000004 HC RX 250 GENERAL PHARMACY W/ HCPCS (ALT 636 FOR OP/ED): Performed by: SURGERY

## 2024-11-20 PROCEDURE — 99140 ANES COMP EMERGENCY COND: CPT | Performed by: ANESTHESIOLOGY

## 2024-11-20 PROCEDURE — 85730 THROMBOPLASTIN TIME PARTIAL: CPT | Performed by: EMERGENCY MEDICINE

## 2024-11-20 PROCEDURE — 88302 TISSUE EXAM BY PATHOLOGIST: CPT | Performed by: PATHOLOGY

## 2024-11-20 PROCEDURE — 2500000004 HC RX 250 GENERAL PHARMACY W/ HCPCS (ALT 636 FOR OP/ED)

## 2024-11-20 PROCEDURE — 2720000007 HC OR 272 NO HCPCS: Performed by: SURGERY

## 2024-11-20 PROCEDURE — 71045 X-RAY EXAM CHEST 1 VIEW: CPT

## 2024-11-20 PROCEDURE — 0BQT4ZZ REPAIR DIAPHRAGM, PERCUTANEOUS ENDOSCOPIC APPROACH: ICD-10-PCS | Performed by: SURGERY

## 2024-11-20 PROCEDURE — 85610 PROTHROMBIN TIME: CPT | Performed by: EMERGENCY MEDICINE

## 2024-11-20 PROCEDURE — 71046 X-RAY EXAM CHEST 2 VIEWS: CPT

## 2024-11-20 PROCEDURE — 71045 X-RAY EXAM CHEST 1 VIEW: CPT | Performed by: RADIOLOGY

## 2024-11-20 PROCEDURE — 36415 COLL VENOUS BLD VENIPUNCTURE: CPT | Performed by: EMERGENCY MEDICINE

## 2024-11-20 PROCEDURE — 2020000001 HC ICU ROOM DAILY

## 2024-11-20 RX ORDER — PROCHLORPERAZINE EDISYLATE 5 MG/ML
5 INJECTION INTRAMUSCULAR; INTRAVENOUS ONCE
Status: COMPLETED | OUTPATIENT
Start: 2024-11-20 | End: 2024-11-20

## 2024-11-20 RX ORDER — METOCLOPRAMIDE HYDROCHLORIDE 5 MG/ML
5 INJECTION INTRAMUSCULAR; INTRAVENOUS EVERY 6 HOURS PRN
Status: DISCONTINUED | OUTPATIENT
Start: 2024-11-20 | End: 2024-11-24 | Stop reason: HOSPADM

## 2024-11-20 RX ORDER — FENTANYL CITRATE 50 UG/ML
INJECTION, SOLUTION INTRAMUSCULAR; INTRAVENOUS AS NEEDED
Status: DISCONTINUED | OUTPATIENT
Start: 2024-11-20 | End: 2024-11-20

## 2024-11-20 RX ORDER — SCOLOPAMINE TRANSDERMAL SYSTEM 1 MG/1
PATCH, EXTENDED RELEASE TRANSDERMAL
Status: COMPLETED
Start: 2024-11-20 | End: 2024-11-23

## 2024-11-20 RX ORDER — BUPIVACAINE HYDROCHLORIDE 2.5 MG/ML
INJECTION, SOLUTION EPIDURAL; INFILTRATION; INTRACAUDAL AS NEEDED
Status: DISCONTINUED | OUTPATIENT
Start: 2024-11-20 | End: 2024-11-20 | Stop reason: HOSPADM

## 2024-11-20 RX ORDER — OXYCODONE HCL 5 MG/5 ML
10 SOLUTION, ORAL ORAL EVERY 4 HOURS PRN
Status: DISCONTINUED | OUTPATIENT
Start: 2024-11-20 | End: 2024-11-20

## 2024-11-20 RX ORDER — METOPROLOL TARTRATE 1 MG/ML
5 INJECTION, SOLUTION INTRAVENOUS EVERY 6 HOURS
Status: DISCONTINUED | OUTPATIENT
Start: 2024-11-20 | End: 2024-11-24 | Stop reason: HOSPADM

## 2024-11-20 RX ORDER — PANTOPRAZOLE SODIUM 40 MG/10ML
40 INJECTION, POWDER, LYOPHILIZED, FOR SOLUTION INTRAVENOUS
Status: DISCONTINUED | OUTPATIENT
Start: 2024-11-21 | End: 2024-11-24 | Stop reason: HOSPADM

## 2024-11-20 RX ORDER — SODIUM CHLORIDE, SODIUM LACTATE, POTASSIUM CHLORIDE, CALCIUM CHLORIDE 600; 310; 30; 20 MG/100ML; MG/100ML; MG/100ML; MG/100ML
100 INJECTION, SOLUTION INTRAVENOUS CONTINUOUS
Status: ACTIVE | OUTPATIENT
Start: 2024-11-20 | End: 2024-11-21

## 2024-11-20 RX ORDER — PROPOFOL 10 MG/ML
INJECTION, EMULSION INTRAVENOUS AS NEEDED
Status: DISCONTINUED | OUTPATIENT
Start: 2024-11-20 | End: 2024-11-20

## 2024-11-20 RX ORDER — HYDROMORPHONE HYDROCHLORIDE 1 MG/ML
1 INJECTION, SOLUTION INTRAMUSCULAR; INTRAVENOUS; SUBCUTANEOUS EVERY 5 MIN PRN
Status: DISCONTINUED | OUTPATIENT
Start: 2024-11-20 | End: 2024-11-21

## 2024-11-20 RX ORDER — ALBUTEROL SULFATE 90 UG/1
INHALANT RESPIRATORY (INHALATION) AS NEEDED
Status: DISCONTINUED | OUTPATIENT
Start: 2024-11-20 | End: 2024-11-20

## 2024-11-20 RX ORDER — HEPARIN SODIUM 5000 [USP'U]/ML
5000 INJECTION, SOLUTION INTRAVENOUS; SUBCUTANEOUS ONCE
Status: COMPLETED | OUTPATIENT
Start: 2024-11-20 | End: 2024-11-20

## 2024-11-20 RX ORDER — ONDANSETRON HYDROCHLORIDE 2 MG/ML
INJECTION, SOLUTION INTRAVENOUS AS NEEDED
Status: DISCONTINUED | OUTPATIENT
Start: 2024-11-20 | End: 2024-11-20

## 2024-11-20 RX ORDER — LIDOCAINE HCL/PF 100 MG/5ML
SYRINGE (ML) INTRAVENOUS AS NEEDED
Status: DISCONTINUED | OUTPATIENT
Start: 2024-11-20 | End: 2024-11-20

## 2024-11-20 RX ORDER — PHENYLEPHRINE HCL IN 0.9% NACL 1 MG/10 ML
SYRINGE (ML) INTRAVENOUS AS NEEDED
Status: DISCONTINUED | OUTPATIENT
Start: 2024-11-20 | End: 2024-11-20

## 2024-11-20 RX ORDER — DEXTROMETHORPHAN/PSEUDOEPHED 2.5-7.5/.8
40 DROPS ORAL 4 TIMES DAILY PRN
Status: DISCONTINUED | OUTPATIENT
Start: 2024-11-20 | End: 2024-11-20

## 2024-11-20 RX ORDER — SUCCINYLCHOLINE CHLORIDE 20 MG/ML INJECTION SOLUTION
SOLUTION AS NEEDED
Status: DISCONTINUED | OUTPATIENT
Start: 2024-11-20 | End: 2024-11-20

## 2024-11-20 RX ORDER — APREPITANT 40 MG/1
40 CAPSULE ORAL ONCE
Status: DISCONTINUED | OUTPATIENT
Start: 2024-11-20 | End: 2024-11-20

## 2024-11-20 RX ORDER — METOCLOPRAMIDE 10 MG/1
5 TABLET ORAL EVERY 6 HOURS PRN
Status: DISCONTINUED | OUTPATIENT
Start: 2024-11-20 | End: 2024-11-20

## 2024-11-20 RX ORDER — ALBUTEROL SULFATE 90 UG/1
2 INHALANT RESPIRATORY (INHALATION) EVERY 2 HOUR PRN
Status: DISCONTINUED | OUTPATIENT
Start: 2024-11-20 | End: 2024-11-24 | Stop reason: HOSPADM

## 2024-11-20 RX ORDER — MEPERIDINE HYDROCHLORIDE 25 MG/ML
12.5 INJECTION INTRAMUSCULAR; INTRAVENOUS; SUBCUTANEOUS EVERY 10 MIN PRN
Status: DISCONTINUED | OUTPATIENT
Start: 2024-11-20 | End: 2024-11-24 | Stop reason: HOSPADM

## 2024-11-20 RX ORDER — DIPHENHYDRAMINE HYDROCHLORIDE 50 MG/ML
12.5 INJECTION INTRAMUSCULAR; INTRAVENOUS ONCE
Status: COMPLETED | OUTPATIENT
Start: 2024-11-20 | End: 2024-11-20

## 2024-11-20 RX ORDER — CEFAZOLIN SODIUM 2 G/100ML
2 INJECTION, SOLUTION INTRAVENOUS ONCE
Status: COMPLETED | OUTPATIENT
Start: 2024-11-20 | End: 2024-11-20

## 2024-11-20 RX ORDER — ALBUTEROL SULFATE 90 UG/1
2 INHALANT RESPIRATORY (INHALATION) EVERY 4 HOURS PRN
Status: DISCONTINUED | OUTPATIENT
Start: 2024-11-20 | End: 2024-11-20

## 2024-11-20 RX ORDER — HYDROMORPHONE HYDROCHLORIDE 1 MG/ML
0.5 INJECTION, SOLUTION INTRAMUSCULAR; INTRAVENOUS; SUBCUTANEOUS EVERY 4 HOURS PRN
Status: DISCONTINUED | OUTPATIENT
Start: 2024-11-20 | End: 2024-11-23

## 2024-11-20 RX ORDER — HYDROMORPHONE HYDROCHLORIDE 1 MG/ML
0.2 INJECTION, SOLUTION INTRAMUSCULAR; INTRAVENOUS; SUBCUTANEOUS EVERY 4 HOURS PRN
Status: DISCONTINUED | OUTPATIENT
Start: 2024-11-20 | End: 2024-11-23

## 2024-11-20 RX ORDER — SODIUM CHLORIDE, SODIUM LACTATE, POTASSIUM CHLORIDE, CALCIUM CHLORIDE 600; 310; 30; 20 MG/100ML; MG/100ML; MG/100ML; MG/100ML
100 INJECTION, SOLUTION INTRAVENOUS CONTINUOUS
Status: DISCONTINUED | OUTPATIENT
Start: 2024-11-20 | End: 2024-11-20

## 2024-11-20 RX ORDER — ACETAMINOPHEN 10 MG/ML
1000 INJECTION, SOLUTION INTRAVENOUS EVERY 6 HOURS SCHEDULED
Status: DISPENSED | OUTPATIENT
Start: 2024-11-20 | End: 2024-11-24

## 2024-11-20 RX ORDER — CEFAZOLIN SODIUM 2 G/100ML
INJECTION, SOLUTION INTRAVENOUS
Status: COMPLETED
Start: 2024-11-20 | End: 2024-11-20

## 2024-11-20 RX ORDER — BENZTROPINE MESYLATE 1 MG/ML
1 INJECTION, SOLUTION INTRAMUSCULAR; INTRAVENOUS ONCE
Status: COMPLETED | OUTPATIENT
Start: 2024-11-20 | End: 2024-11-20

## 2024-11-20 RX ORDER — PROPRANOLOL HYDROCHLORIDE 40 MG/5ML
40 SOLUTION ORAL EVERY 12 HOURS
Status: CANCELLED | OUTPATIENT
Start: 2024-11-20

## 2024-11-20 RX ORDER — ROCURONIUM BROMIDE 10 MG/ML
INJECTION, SOLUTION INTRAVENOUS AS NEEDED
Status: DISCONTINUED | OUTPATIENT
Start: 2024-11-20 | End: 2024-11-20

## 2024-11-20 RX ORDER — ONDANSETRON HYDROCHLORIDE 2 MG/ML
4 INJECTION, SOLUTION INTRAVENOUS EVERY 8 HOURS PRN
Status: DISCONTINUED | OUTPATIENT
Start: 2024-11-20 | End: 2024-11-24 | Stop reason: HOSPADM

## 2024-11-20 RX ORDER — ONDANSETRON 4 MG/1
4 TABLET, ORALLY DISINTEGRATING ORAL EVERY 8 HOURS PRN
Status: DISCONTINUED | OUTPATIENT
Start: 2024-11-20 | End: 2024-11-20

## 2024-11-20 RX ORDER — PANTOPRAZOLE SODIUM 40 MG/1
40 TABLET, DELAYED RELEASE ORAL
Status: DISCONTINUED | OUTPATIENT
Start: 2024-11-21 | End: 2024-11-20

## 2024-11-20 RX ORDER — CLONIDINE 100 UG/ML
INJECTION, SOLUTION EPIDURAL AS NEEDED
Status: DISCONTINUED | OUTPATIENT
Start: 2024-11-20 | End: 2024-11-20 | Stop reason: HOSPADM

## 2024-11-20 RX ORDER — OXYCODONE HCL 5 MG/5 ML
5 SOLUTION, ORAL ORAL EVERY 4 HOURS PRN
Status: DISCONTINUED | OUTPATIENT
Start: 2024-11-20 | End: 2024-11-20

## 2024-11-20 RX ORDER — NALOXONE HYDROCHLORIDE 1 MG/ML
0.2 INJECTION INTRAMUSCULAR; INTRAVENOUS; SUBCUTANEOUS EVERY 5 MIN PRN
Status: DISCONTINUED | OUTPATIENT
Start: 2024-11-20 | End: 2024-11-24 | Stop reason: HOSPADM

## 2024-11-20 RX ORDER — SCOLOPAMINE TRANSDERMAL SYSTEM 1 MG/1
1 PATCH, EXTENDED RELEASE TRANSDERMAL
Status: DISCONTINUED | OUTPATIENT
Start: 2024-11-20 | End: 2024-11-24 | Stop reason: HOSPADM

## 2024-11-20 RX ORDER — ALBUMIN HUMAN 50 G/1000ML
SOLUTION INTRAVENOUS AS NEEDED
Status: DISCONTINUED | OUTPATIENT
Start: 2024-11-20 | End: 2024-11-20

## 2024-11-20 RX ORDER — CARBIDOPA AND LEVODOPA 25; 100 MG/1; MG/1
1 TABLET ORAL 3 TIMES DAILY
Status: DISCONTINUED | OUTPATIENT
Start: 2024-11-20 | End: 2024-11-20

## 2024-11-20 RX ORDER — PRAMIPEXOLE DIHYDROCHLORIDE 0.25 MG/1
0.5 TABLET ORAL 3 TIMES DAILY
Status: DISCONTINUED | OUTPATIENT
Start: 2024-11-20 | End: 2024-11-24 | Stop reason: HOSPADM

## 2024-11-20 RX ORDER — HEPARIN SODIUM 5000 [USP'U]/ML
5000 INJECTION, SOLUTION INTRAVENOUS; SUBCUTANEOUS EVERY 8 HOURS
Status: DISCONTINUED | OUTPATIENT
Start: 2024-11-21 | End: 2024-11-24 | Stop reason: HOSPADM

## 2024-11-20 SDOH — SOCIAL STABILITY: SOCIAL INSECURITY
WITHIN THE LAST YEAR, HAVE YOU BEEN KICKED, HIT, SLAPPED, OR OTHERWISE PHYSICALLY HURT BY YOUR PARTNER OR EX-PARTNER?: PATIENT UNABLE TO ANSWER

## 2024-11-20 SDOH — ECONOMIC STABILITY: FOOD INSECURITY
WITHIN THE PAST 12 MONTHS, YOU WORRIED THAT YOUR FOOD WOULD RUN OUT BEFORE YOU GOT THE MONEY TO BUY MORE.: PATIENT UNABLE TO ANSWER

## 2024-11-20 SDOH — ECONOMIC STABILITY: FOOD INSECURITY
HOW HARD IS IT FOR YOU TO PAY FOR THE VERY BASICS LIKE FOOD, HOUSING, MEDICAL CARE, AND HEATING?: PATIENT UNABLE TO ANSWER

## 2024-11-20 SDOH — ECONOMIC STABILITY: INCOME INSECURITY
IN THE PAST 12 MONTHS HAS THE ELECTRIC, GAS, OIL, OR WATER COMPANY THREATENED TO SHUT OFF SERVICES IN YOUR HOME?: PATIENT UNABLE TO ANSWER

## 2024-11-20 SDOH — SOCIAL STABILITY: SOCIAL INSECURITY: ARE THERE ANY APPARENT SIGNS OF INJURIES/BEHAVIORS THAT COULD BE RELATED TO ABUSE/NEGLECT?: NO

## 2024-11-20 SDOH — SOCIAL STABILITY: SOCIAL INSECURITY: DO YOU FEEL UNSAFE GOING BACK TO THE PLACE WHERE YOU ARE LIVING?: NO

## 2024-11-20 SDOH — SOCIAL STABILITY: SOCIAL INSECURITY
WITHIN THE LAST YEAR, HAVE YOU BEEN RAPED OR FORCED TO HAVE ANY KIND OF SEXUAL ACTIVITY BY YOUR PARTNER OR EX-PARTNER?: PATIENT UNABLE TO ANSWER

## 2024-11-20 SDOH — SOCIAL STABILITY: SOCIAL INSECURITY: WITHIN THE LAST YEAR, HAVE YOU BEEN AFRAID OF YOUR PARTNER OR EX-PARTNER?: PATIENT UNABLE TO ANSWER

## 2024-11-20 SDOH — ECONOMIC STABILITY: HOUSING INSECURITY: AT ANY TIME IN THE PAST 12 MONTHS, WERE YOU HOMELESS OR LIVING IN A SHELTER (INCLUDING NOW)?: PATIENT UNABLE TO ANSWER

## 2024-11-20 SDOH — ECONOMIC STABILITY: FOOD INSECURITY
WITHIN THE PAST 12 MONTHS, THE FOOD YOU BOUGHT JUST DIDN'T LAST AND YOU DIDN'T HAVE MONEY TO GET MORE.: PATIENT UNABLE TO ANSWER

## 2024-11-20 SDOH — ECONOMIC STABILITY: TRANSPORTATION INSECURITY
IN THE PAST 12 MONTHS, HAS LACK OF TRANSPORTATION KEPT YOU FROM MEDICAL APPOINTMENTS OR FROM GETTING MEDICATIONS?: PATIENT UNABLE TO ANSWER

## 2024-11-20 SDOH — HEALTH STABILITY: MENTAL HEALTH: CURRENT SMOKER: 0

## 2024-11-20 SDOH — SOCIAL STABILITY: SOCIAL INSECURITY: HAVE YOU HAD ANY THOUGHTS OF HARMING ANYONE ELSE?: NO

## 2024-11-20 SDOH — SOCIAL STABILITY: SOCIAL INSECURITY: ABUSE: ADULT

## 2024-11-20 SDOH — SOCIAL STABILITY: SOCIAL INSECURITY: DOES ANYONE TRY TO KEEP YOU FROM HAVING/CONTACTING OTHER FRIENDS OR DOING THINGS OUTSIDE YOUR HOME?: NO

## 2024-11-20 SDOH — SOCIAL STABILITY: SOCIAL INSECURITY
WITHIN THE LAST YEAR, HAVE YOU BEEN HUMILIATED OR EMOTIONALLY ABUSED IN OTHER WAYS BY YOUR PARTNER OR EX-PARTNER?: PATIENT UNABLE TO ANSWER

## 2024-11-20 SDOH — SOCIAL STABILITY: SOCIAL INSECURITY: ARE YOU OR HAVE YOU BEEN THREATENED OR ABUSED PHYSICALLY, EMOTIONALLY, OR SEXUALLY BY ANYONE?: NO

## 2024-11-20 SDOH — SOCIAL STABILITY: SOCIAL INSECURITY: HAVE YOU HAD THOUGHTS OF HARMING ANYONE ELSE?: NO

## 2024-11-20 SDOH — ECONOMIC STABILITY: HOUSING INSECURITY
IN THE LAST 12 MONTHS, WAS THERE A TIME WHEN YOU WERE NOT ABLE TO PAY THE MORTGAGE OR RENT ON TIME?: PATIENT UNABLE TO ANSWER

## 2024-11-20 SDOH — SOCIAL STABILITY: SOCIAL INSECURITY: WERE YOU ABLE TO COMPLETE ALL THE BEHAVIORAL HEALTH SCREENINGS?: YES

## 2024-11-20 SDOH — SOCIAL STABILITY: SOCIAL INSECURITY: DO YOU FEEL ANYONE HAS EXPLOITED OR TAKEN ADVANTAGE OF YOU FINANCIALLY OR OF YOUR PERSONAL PROPERTY?: NO

## 2024-11-20 SDOH — SOCIAL STABILITY: SOCIAL INSECURITY: HAS ANYONE EVER THREATENED TO HURT YOUR FAMILY OR YOUR PETS?: NO

## 2024-11-20 ASSESSMENT — ENCOUNTER SYMPTOMS
VOMITING: 1
MUSCULOSKELETAL NEGATIVE: 1
SHORTNESS OF BREATH: 1
NEUROLOGICAL NEGATIVE: 1
CARDIOVASCULAR NEGATIVE: 1
COUGH: 0
COUGH: 1
NAUSEA: 1
PSYCHIATRIC NEGATIVE: 1
EYES NEGATIVE: 1
NAUSEA: 0
HEMATOLOGIC/LYMPHATIC NEGATIVE: 1
UNEXPECTED WEIGHT CHANGE: 1
ABDOMINAL PAIN: 1
VOMITING: 0
ENDOCRINE NEGATIVE: 1
ALLERGIC/IMMUNOLOGIC NEGATIVE: 1

## 2024-11-20 ASSESSMENT — PATIENT HEALTH QUESTIONNAIRE - PHQ9
2. FEELING DOWN, DEPRESSED OR HOPELESS: NOT AT ALL
1. LITTLE INTEREST OR PLEASURE IN DOING THINGS: NOT AT ALL
SUM OF ALL RESPONSES TO PHQ9 QUESTIONS 1 & 2: 0

## 2024-11-20 ASSESSMENT — PAIN SCALES - GENERAL
PAINLEVEL_OUTOF10: 10 - WORST POSSIBLE PAIN
PAIN_LEVEL: 0
PAINLEVEL_OUTOF10: 8
PAINLEVEL_OUTOF10: 10 - WORST POSSIBLE PAIN
PAINLEVEL_OUTOF10: 0 - NO PAIN

## 2024-11-20 ASSESSMENT — LIFESTYLE VARIABLES
TOTAL SCORE: 0
AUDIT-C TOTAL SCORE: 0
HOW MANY STANDARD DRINKS CONTAINING ALCOHOL DO YOU HAVE ON A TYPICAL DAY: PATIENT DOES NOT DRINK
SKIP TO QUESTIONS 9-10: 1
SUBSTANCE_ABUSE_PAST_12_MONTHS: NO
EVER HAD A DRINK FIRST THING IN THE MORNING TO STEADY YOUR NERVES TO GET RID OF A HANGOVER: NO
HAVE YOU EVER FELT YOU SHOULD CUT DOWN ON YOUR DRINKING: NO
HOW OFTEN DO YOU HAVE A DRINK CONTAINING ALCOHOL: NEVER
AUDIT-C TOTAL SCORE: 0
PRESCIPTION_ABUSE_PAST_12_MONTHS: NO
HAVE PEOPLE ANNOYED YOU BY CRITICIZING YOUR DRINKING: NO
HOW OFTEN DO YOU HAVE 6 OR MORE DRINKS ON ONE OCCASION: NEVER
EVER FELT BAD OR GUILTY ABOUT YOUR DRINKING: NO

## 2024-11-20 ASSESSMENT — COGNITIVE AND FUNCTIONAL STATUS - GENERAL
MOBILITY SCORE: 24
PATIENT BASELINE BEDBOUND: NO
DAILY ACTIVITIY SCORE: 24

## 2024-11-20 ASSESSMENT — ACTIVITIES OF DAILY LIVING (ADL)
LACK_OF_TRANSPORTATION: NO
BATHING: INDEPENDENT
LACK_OF_TRANSPORTATION: PATIENT UNABLE TO ANSWER
PATIENT'S MEMORY ADEQUATE TO SAFELY COMPLETE DAILY ACTIVITIES?: YES
TOILETING: INDEPENDENT
WALKS IN HOME: INDEPENDENT
JUDGMENT_ADEQUATE_SAFELY_COMPLETE_DAILY_ACTIVITIES: YES
HEARING - RIGHT EAR: FUNCTIONAL
HEARING - LEFT EAR: FUNCTIONAL
ADEQUATE_TO_COMPLETE_ADL: YES
DRESSING YOURSELF: INDEPENDENT
GROOMING: INDEPENDENT
FEEDING YOURSELF: INDEPENDENT

## 2024-11-20 ASSESSMENT — PAIN - FUNCTIONAL ASSESSMENT
PAIN_FUNCTIONAL_ASSESSMENT: 0-10

## 2024-11-20 ASSESSMENT — COLUMBIA-SUICIDE SEVERITY RATING SCALE - C-SSRS
6. HAVE YOU EVER DONE ANYTHING, STARTED TO DO ANYTHING, OR PREPARED TO DO ANYTHING TO END YOUR LIFE?: NO
2. HAVE YOU ACTUALLY HAD ANY THOUGHTS OF KILLING YOURSELF?: NO
1. IN THE PAST MONTH, HAVE YOU WISHED YOU WERE DEAD OR WISHED YOU COULD GO TO SLEEP AND NOT WAKE UP?: NO
6. HAVE YOU EVER DONE ANYTHING, STARTED TO DO ANYTHING, OR PREPARED TO DO ANYTHING TO END YOUR LIFE?: NO
2. HAVE YOU ACTUALLY HAD ANY THOUGHTS OF KILLING YOURSELF?: NO
1. IN THE PAST MONTH, HAVE YOU WISHED YOU WERE DEAD OR WISHED YOU COULD GO TO SLEEP AND NOT WAKE UP?: NO

## 2024-11-20 ASSESSMENT — PAIN DESCRIPTION - LOCATION
LOCATION: SHOULDER
LOCATION: ABDOMEN

## 2024-11-20 ASSESSMENT — PAIN DESCRIPTION - ORIENTATION: ORIENTATION: LEFT

## 2024-11-20 NOTE — TREATMENT PLAN
Patient is a 69-year-old female with a previous history of paraesophageal hiatal hernia repair with Nissen fundoplication 6 years ago by Dr. Luz.  Patient has been having recurrent symptoms and was known to have a recurrent paraesophageal hiatal hernia and repair was planned for in December however she was admitted to [facility] last week with significant nausea vomiting dysphagia issues where imaging was consistent with recurrent paraesophageal hiatal hernia and there was a suspicion that she had aspiration pneumonitis as well.  She was discharged home however was not doing well and was barely tolerating anything by mouth to the point even liquids were having a hard time going down.  She called her primary surgeon who contacted me because primary surgeon was not available to take the patient for an urgent hernia repair in timely fashion at the location of patient's choice on the [place] so she referred the patient to me and advised the patient to come to emergency room in [facility] where I met the patient and examined her and went over her imaging findings and proposed plan.  Due to severity of symptoms I do not think further observation and delaying would be much beneficial.  She may already have element of ischemia of the stomach as she was tachycardic upon presentation and has leukocytosis to 19K however examination was relatively benign and she did not have any peritonitis on hoping that we should be able to reduce the hernia and repair it without gastric resection however that is a possibility and I have discussed this with the patient.  Risk of aspiration and worsening pulmonary infiltrate which is pre-existing and possibility of pneumonia need for staying on ventilator etc. was also discussed with the patient we discussed the possibility of feeding tube placement as well depending on what the stomach looks like after hernia was reduced.  We may have to undo the wrap and probably will not be  able to redo it in the setting because of the acute nature of the surgery and edema I am expecting with the tissue.  Risk of other complications like bleeding infection leak damage to the organs cardiopulmonary complications DVT pulmonary embolism dysphagia recurrent hernia or even death was also discussed with the patient and all questions were answered.  Patient wanted to be fixed as soon as possible and was very happy when she was told that we are putting her on the add-on schedule and we will try to take care of it tonight.  She is being resuscitated in the emergency room and we plan to take her to the OR later today.

## 2024-11-20 NOTE — PROGRESS NOTES
Pharmacy Medication History Review    Oneyda Parmar is a 69 y.o. female admitted for No Principal Problem: There is no principal problem currently on the Problem List. Please update the Problem List and refresh.. Pharmacy reviewed the patient's [id number] medications and allergies for accuracy.    The list below reflectives the updated PTA list. Please review each medication in order reconciliation for additional clarification and justification.  Prior to Admission medications    Medication Sig Start Date End Date Authorizing Provider   metoclopramide (Reglan) 10 mg tablet Take 1 tablet (10 mg) by mouth every 6 hours if needed (Nausea/Vomiting) for up to 14 days. 11/15/24 11/29/24 Tran Campos, DO   ondansetron ODT (Zofran-ODT) 8 mg disintegrating tablet Take 1 tablet (8 mg) by mouth every 8 hours if needed for nausea or vomiting for up to 10 days. 11/18/24 11/28/24 Miguel Alvarado, DO   albuterol 90 mcg/actuation inhaler Inhale 2 puffs every 4 hours if needed.   Historical Provider, MD   carbidopa-levodopa (Sinemet)  mg tablet Take 1 tablet by mouth 3 times a day. Start with half a tablet three times daily for one week then go to one full tablet after that.  Patient not taking: Reported on 11/14/2024 9/16/24 9/16/25 Candido Will MD   cholecalciferol, vitamin D3, 25 mcg (1,000 unit) chewable gummy Take 1 each (1,000 Units) by mouth once daily. 10/22/24 4/20/25 Carly Metzger MD   ibuprofen 600 mg tablet Take 1 tablet (600 mg) by mouth every 8 hours if needed for mild pain (1 - 3).   Miguel Alvarado, DO   immune globulin, human, (Gammagard Liquid) infusion Infuse monthly as directed   Historical Provider, MD   omeprazole (PriLOSEC) 40 mg DR capsule Take 1 capsule (40 mg) by mouth once daily. Do not crush or chew.   Tran Campos, DO   pramipexole (Mirapex) 0.5 mg tablet Take 1 tablet (0.5 mg) by mouth 3 times a day.   Historical Provider, MD   propranolol LA (Inderal LA) 80 mg 24 hr capsule Take  1 capsule (80 mg) by mouth once daily.   Miguel Alvarado DO   rizatriptan (Maxalt) 10 mg tablet Take 1 tablet (10 mg) by mouth once daily as needed for migraine.   Miguel Alvarado DO        The list below reflectives the updated allergy list. Please review each documented allergy for additional clarification and justification.  Allergies  Reviewed by Lorena Gamez RN on 11/20/2024        Severity Reactions Comments    Doxycycline Low Nausea/vomiting     Prednisone Low Other Red and hot when taking prednisone    Topiramate Low Palpitations, Nausea/vomiting             Below are additional concerns with the patient's PTA list.      Raissa Baxter

## 2024-11-20 NOTE — ED TRIAGE NOTES
Pt presents to ED for report of vomiting x2 weeks. Pt states she has an esophageal hiatal hernia and is to have surgery 12/4. Pt states she was told to come to ED due to excessive vomiting and weakness. Pt denies abdominal pain. Pt reports pain in chest due possible aspiration.

## 2024-11-20 NOTE — ANESTHESIA PREPROCEDURE EVALUATION
Patient: Oneyda Parmar    Procedure Information       Date/Time: 11/20/24 1800    Procedure: Repair hiatal Hernia Laparoscopy, possible gastric resection, possible feeding tube    Location: [facility] / [facility]    Surgeons: Husam Chaudhary MD            Relevant Problems   Anesthesia   (+) PONV (postoperative nausea and vomiting)      Cardiac   (+) Borderline hyperlipidemia      Pulmonary   (+) Pneumonia of right lung due to Pseudomonas species (Multi)   (+) Pulmonary nodules      Neuro   (+) Major depressive disorder, recurrent, mild (CMS-HCC)   (+) Major depressive disorder, recurrent, moderate   (+) Major depressive disorder, recurrent, unspecified      GI   (+) Dysphagia   (+) Gastroesophageal reflux disease   (+) Hiatal hernia   (+) Paraesophageal hernia with gastroesophageal reflux      Musculoskeletal   (+) Chronic low back pain   (+) Scoliosis      HEENT   (+) Chronic sinusitis      ID   (+) Atypical mycobacterial disease   (+) Pneumonia of right lung due to Pseudomonas species (Multi)      GYN   (+) Fibroid, uterine       Clinical information reviewed:    Allergies  Meds               NPO Detail:  No data recorded     Physical Exam    Airway  Mallampati: II  TM distance: >3 FB  Neck ROM: full     Cardiovascular - normal exam     Dental - normal exam     Pulmonary - normal exam     Abdominal - normal exam       Other findings: Full upper caps/veneers          Anesthesia Plan    History of general anesthesia?: yes  History of complications of general anesthesia?: no    ASA 3 - emergent     general     The patient is not a current smoker.  Patient was previously instructed to abstain from smoking on day of procedure.  Patient did not smoke on day of procedure.    intravenous induction   Postoperative administration of opioids is intended.  Trial extubation is planned.  Anesthetic plan and risks discussed with patient.  Use of blood products discussed with patient who consented to blood products.     Plan discussed with CRNA.

## 2024-11-20 NOTE — H&P
History Of Present Illness  Oneyda Parmar is a 69 y.o. female with a history of a hiatal hernia S/P laparoscopic HHR with a Nissen fundoplication 6 years ago. She presents with multiple episodes of nausea and vomiting. Sensation of food getting stuck in her chest and 10 lb weight loss. She was admitted a week ago and imaging showed a recurrent hiatal hernia. She presents today for operative intervention.      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)     Anti-pneumococcal polysaccharide antibody deficiency    COVID-19 09/21/2022    COVID-19 virus infection    COVID-19 virus infection 06/21/2023    Dysphagia, unspecified     Dysphagia, idiopathic    Encounter for screening for malignant neoplasm of cervix     Pap smear for cervical cancer screening    Localized edema 05/04/2022    Bilateral edema of lower extremity    Parkinson's disease (Multi) 06/21/2022    Parkinsonism    Personal history of other diseases of the digestive system 07/18/2018    History of hiatal hernia    Personal history of other diseases of the musculoskeletal system and connective tissue     History of osteoarthritis    Personal history of other diseases of the respiratory system 10/08/2017    History of asthma    Personal history of other endocrine, nutritional and metabolic disease 08/08/2016    History of hyperlipidemia    Personal history of other endocrine, nutritional and metabolic disease 10/08/2017    History of hypothyroidism    Personal history of other malignant neoplasm of skin     History of malignant neoplasm of skin    Personal history of other medical treatment     History of screening mammography    Personal history of pneumonia (recurrent) 03/18/2020    History of pneumonia due to Pseudomonas    Personal history of pneumonia (recurrent) 01/29/2021    History of pneumonia due to Pseudomonas    Pulmonary  mycobacterial infection (Multi)     Mycobacterium avium-intracellulare complex    Raynaud's syndrome without gangrene     Raynaud disease       Surgical History  Past Surgical History:   Procedure Laterality Date    BREAST LUMPECTOMY  06/23/2017    Right Breast Lumpectomy    COLONOSCOPY  07/29/2015    Complete Colonoscopy    GASTRIC FUNDOPLICATION  07/19/2018    Esophagogastric Fundoplasty Nissen Fundoplication    OTHER SURGICAL HISTORY  02/19/2015    Treatment Of Jaw    OTHER SURGICAL HISTORY  06/06/2019    Nissen fundoplication laparoscopic    OTHER SURGICAL HISTORY  06/23/2017    Biopsy Pleural    OTHER SURGICAL HISTORY  06/23/2017    Shoulder Repair    SINUS SURGERY  06/23/2017    Sinus Surgery    TONSILLECTOMY  06/23/2017    Tonsillectomy        Social History  She reports that she has never smoked. She has never been exposed to tobacco smoke. She has never used smokeless tobacco. She reports that she does not currently use alcohol. She reports that she does not use drugs.    Family History  Family History   Problem Relation Name Age of Onset    Other (cerebrovascular accident) Mother      COPD Father      Prostate cancer Brother          Allergies  Doxycycline, Prednisone, and Topiramate    Review of Systems   Constitutional:  Positive for unexpected weight change.   HENT: Negative.     Eyes: Negative.    Respiratory:  Positive for cough.    Cardiovascular: Negative.    Gastrointestinal:  Positive for abdominal pain, nausea and vomiting.   Endocrine: Negative.    Genitourinary: Negative.    Musculoskeletal: Negative.    Skin: Negative.    Allergic/Immunologic: Negative.    Neurological: Negative.    Hematological: Negative.    Psychiatric/Behavioral: Negative.          Physical Exam  Constitutional:       General: She is not in acute distress.     Appearance: Normal appearance. She is normal weight. She is not ill-appearing, toxic-appearing or diaphoretic.   HENT:      Head: Normocephalic and atraumatic.       "Nose: Nose normal.      Mouth/Throat:      Mouth: Mucous membranes are moist.   Eyes:      Conjunctiva/sclera: Conjunctivae normal.   Pulmonary:      Effort: Pulmonary effort is normal.   Abdominal:      General: Abdomen is flat. There is no distension.      Palpations: Abdomen is soft. There is no mass.      Tenderness: There is no abdominal tenderness. There is no guarding or rebound.   Skin:     General: Skin is warm and dry.   Neurological:      General: No focal deficit present.      Mental Status: She is alert and oriented to person, place, and time.   Psychiatric:         Mood and Affect: Mood normal.         Behavior: Behavior normal.        Last Recorded Vitals  Blood pressure 127/64, pulse 90, temperature 35.7 °C (96.3 °F), temperature source Tympanic, resp. rate 16, height 1.651 m (5' 5\"), weight 66.7 kg (147 lb), SpO2 96%.    Relevant Results       Assessment/Plan   Assessment & Plan  Paraesophageal hernia with gastroesophageal reflux    Renal insufficiency      Assessment: 69F with a history of a hiatal hernia, S/P HHR and Nissen fundoplication, now presenting with a recurrent HH and incarceration. Patient is being admitted for operative repair     Plan:  - NPO  - PPI  - LISA 5000 units x 1  - IV Ancef   - OR for laparoscopic HHR       I spent 60 minutes in the professional and overall care of this patient.      Julio Cooper MD  "

## 2024-11-20 NOTE — ANESTHESIA PROCEDURE NOTES
Airway  Date/Time: 11/20/2024 6:49 PM  Urgency: elective    Airway not difficult    Staffing  Performed: CRNA   Authorized by: Dash Martin MD    Performed by: MARTHA Valdivia-EMANI  Patient location during procedure: OR    Indications and Patient Condition  Indications for airway management: anesthesia and airway protection  Spontaneous Ventilation: absent  Sedation level: deep  Preoxygenated: yes  Patient position: sniffing  MILS maintained throughout  Mask difficulty assessment: 0 - not attempted (RSI)  Planned trial extubation    Final Airway Details  Final airway type: endotracheal airway      Successful airway: ETT  Cuffed: yes   Successful intubation technique: video laryngoscopy (Martin)  Facilitating devices/methods: intubating stylet and cricoid pressure (RSI)  Endotracheal tube insertion site: oral  Blade type: Martin.  Blade size: #3  ETT size (mm): 7.0  Cormack-Lehane Classification: grade IIa - partial view of glottis  Placement verified by: chest auscultation and capnometry   Cuff volume (mL): 10  Measured from: lips  ETT to lips (cm): 21  Number of attempts at approach: 1  Ventilation between attempts: none  Number of other approaches attempted: 0    Additional Comments  Atraumatic intubation with Martin video laryngoscope. Rapid sequence induction with cricoid pressure. Dentition in preanesthetic state.

## 2024-11-20 NOTE — ED PROVIDER NOTES
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  Patient says his symptoms have been worsening over the past 2 weeks (she was also hospitalized at [facility] on 11/14) for similar symptoms.  During that hospital visit she was also diagnosed with right lower lobe aspiration pneumonia and started on Unasyn.  Today she endorses difficulty swallowing (feels as if food is getting stuck in her esophagus), severe nausea/vomiting, mild shortness of breath.  She also endorses loss of appetite and has only been on clear liquids over the past few days.  She was scheduled for surgery in December for redo fundoplication of paraesophageal hernia.  Patient has been taking Reglan and Zofran, she says that has not been improving her symptoms.    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)     Anti-pneumococcal polysaccharide antibody deficiency    COVID-19 09/21/2022    COVID-19 virus infection    COVID-19 virus infection 06/21/2023    Dysphagia, unspecified     Dysphagia, idiopathic    Encounter for screening for malignant neoplasm of cervix     Pap smear for cervical cancer screening    Localized edema 05/04/2022    Bilateral edema of lower extremity    Parkinson's disease (Multi) 06/21/2022    Parkinsonism    Personal history of other diseases of the digestive system 07/18/2018    History of hiatal hernia    Personal history of other diseases of the musculoskeletal system and connective tissue     History of osteoarthritis    Personal  history of other diseases of the respiratory system 10/08/2017    History of asthma    Personal history of other endocrine, nutritional and metabolic disease 08/08/2016    History of hyperlipidemia    Personal history of other endocrine, nutritional and metabolic disease 10/08/2017    History of hypothyroidism    Personal history of other malignant neoplasm of skin     History of malignant neoplasm of skin    Personal history of other medical treatment     History of screening mammography    Personal history of pneumonia (recurrent) 03/18/2020    History of pneumonia due to Pseudomonas    Personal history of pneumonia (recurrent) 01/29/2021    History of pneumonia due to Pseudomonas    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    Raynaud's syndrome without gangrene     Raynaud disease     Past Surgical History:   Procedure Laterality Date    BREAST LUMPECTOMY  06/23/2017    Right Breast Lumpectomy    COLONOSCOPY  07/29/2015    Complete Colonoscopy    GASTRIC FUNDOPLICATION  07/19/2018    Esophagogastric Fundoplasty Nissen Fundoplication    OTHER SURGICAL HISTORY  02/19/2015    Treatment Of Jaw    OTHER SURGICAL HISTORY  06/06/2019    Nissen fundoplication laparoscopic    OTHER SURGICAL HISTORY  06/23/2017    Biopsy Pleural    OTHER SURGICAL HISTORY  06/23/2017    Shoulder Repair    SINUS SURGERY  06/23/2017    Sinus Surgery    TONSILLECTOMY  06/23/2017    Tonsillectomy     Family History   Problem Relation Name Age of Onset    Other (cerebrovascular accident) Mother      COPD Father      Prostate cancer Brother       Social History     Tobacco Use    Smoking status: Never     Passive exposure: Never    Smokeless tobacco: Never   Vaping Use    Vaping status: Never Used   Substance Use Topics    Alcohol use: Not Currently    Drug use: Never       Physical Exam   ED Triage Vitals [11/20/24 1108]   Temperature Heart Rate Respirations BP   35.7 °C (96.3 °F) (!) 118 18 136/64       Pulse Ox Temp Source Heart Rate Source Patient Position   95 % Tympanic Monitor Sitting      BP Location FiO2 (%)     Right arm --       Physical Exam  Constitutional: well developed, awake, alert, no acute distress  Head/Neck: normocephalic, atraumatic  Respiratory/Thorax no rales, or rhonchi, mild expiratory wheeze  Cardiovascular: RRR, no murmur  Gastrointestinal: soft, nondistended, non tender  Extremities: palpable peripheral pulses, no edema or cyanosis  Neurological: AO x3, no focal deficits  Psychological: appropriate mood and behavior  Skin: warm and dry    ED Course & MDM   Patient came from home today for evaluation of intractable nausea and vomiting.  She has a history of hiatal hernia (status post fundoplication), which reoccurred a month ago, she has had worsening nausea and was scheduled for surgery December 4, but came to the ED as she felt her symptoms were worsening to the point she was unable to swallow food and was on clear liquids.  She says Zofran and Reglan were not helping and she has been vomiting every 1 hour.  In the ED patient was afebrile, /64, heart rate 118, RR 18, pulse ox 95%.  Labs were ordered urinalysis with reflex culture, CBC, CMP, CXR.   1. intractable vomiting  2.  Paraesophageal hiatal hernia  For nausea patient was started on Compazine and Benadryl and received normal saline 1 L bolus  Patient was seen by surgeon Dr. Chaudhary, per his recommendation patient will be scheduled for surgery by him today.  Patient is being admitted under his care.      No data recorded     [place] Coma Scale Score: 15 (11/20/24 1108 : Lorena Gamez RN)                           Medical Decision Making      Procedure  Procedures    Imani Brown MD  PGY1 internal medicine     Imani Brown MD  Resident  11/20/24 1245       Imani Brown MD  Resident  11/20/24 1711       Imani Brown MD  Resident  11/20/24 1711

## 2024-11-21 ENCOUNTER — APPOINTMENT (OUTPATIENT)
Dept: RADIOLOGY | Facility: HOSPITAL | Age: 69
DRG: 327 | End: 2024-11-21
Payer: MEDICARE

## 2024-11-21 LAB
ALBUMIN SERPL BCP-MCNC: 3.7 G/DL (ref 3.4–5)
ALP SERPL-CCNC: 84 U/L (ref 33–136)
ALT SERPL W P-5'-P-CCNC: 33 U/L (ref 7–45)
ANION GAP SERPL CALC-SCNC: 14 MMOL/L (ref 10–20)
AST SERPL W P-5'-P-CCNC: 50 U/L (ref 9–39)
BILIRUB SERPL-MCNC: 0.9 MG/DL (ref 0–1.2)
BUN SERPL-MCNC: 13 MG/DL (ref 6–23)
CALCIUM SERPL-MCNC: 8.6 MG/DL (ref 8.6–10.3)
CHLORIDE SERPL-SCNC: 100 MMOL/L (ref 98–107)
CO2 SERPL-SCNC: 25 MMOL/L (ref 21–32)
CREAT SERPL-MCNC: 0.8 MG/DL (ref 0.5–1.05)
EGFRCR SERPLBLD CKD-EPI 2021: 79 ML/MIN/1.73M*2
ERYTHROCYTE [DISTWIDTH] IN BLOOD BY AUTOMATED COUNT: 12.5 % (ref 11.5–14.5)
GLUCOSE SERPL-MCNC: 121 MG/DL (ref 74–99)
HCT VFR BLD AUTO: 34.2 % (ref 36–46)
HGB BLD-MCNC: 11.2 G/DL (ref 12–16)
MAGNESIUM SERPL-MCNC: 1.6 MG/DL (ref 1.6–2.4)
MCH RBC QN AUTO: 30.4 PG (ref 26–34)
MCHC RBC AUTO-ENTMCNC: 32.7 G/DL (ref 32–36)
MCV RBC AUTO: 93 FL (ref 80–100)
NRBC BLD-RTO: 0 /100 WBCS (ref 0–0)
PHOSPHATE SERPL-MCNC: 3.2 MG/DL (ref 2.5–4.9)
PLATELET # BLD AUTO: 235 X10*3/UL (ref 150–450)
POTASSIUM SERPL-SCNC: 3.7 MMOL/L (ref 3.5–5.3)
PROT SERPL-MCNC: 6.3 G/DL (ref 6.4–8.2)
RBC # BLD AUTO: 3.68 X10*6/UL (ref 4–5.2)
SODIUM SERPL-SCNC: 135 MMOL/L (ref 136–145)
WBC # BLD AUTO: 12.3 X10*3/UL (ref 4.4–11.3)

## 2024-11-21 PROCEDURE — 85027 COMPLETE CBC AUTOMATED: CPT | Performed by: STUDENT IN AN ORGANIZED HEALTH CARE EDUCATION/TRAINING PROGRAM

## 2024-11-21 PROCEDURE — 2500000001 HC RX 250 WO HCPCS SELF ADMINISTERED DRUGS (ALT 637 FOR MEDICARE OP): Performed by: SURGERY

## 2024-11-21 PROCEDURE — 2500000004 HC RX 250 GENERAL PHARMACY W/ HCPCS (ALT 636 FOR OP/ED)

## 2024-11-21 PROCEDURE — 84100 ASSAY OF PHOSPHORUS: CPT | Performed by: STUDENT IN AN ORGANIZED HEALTH CARE EDUCATION/TRAINING PROGRAM

## 2024-11-21 PROCEDURE — 36415 COLL VENOUS BLD VENIPUNCTURE: CPT | Performed by: STUDENT IN AN ORGANIZED HEALTH CARE EDUCATION/TRAINING PROGRAM

## 2024-11-21 PROCEDURE — 2500000005 HC RX 250 GENERAL PHARMACY W/O HCPCS: Performed by: STUDENT IN AN ORGANIZED HEALTH CARE EDUCATION/TRAINING PROGRAM

## 2024-11-21 PROCEDURE — 83735 ASSAY OF MAGNESIUM: CPT | Performed by: STUDENT IN AN ORGANIZED HEALTH CARE EDUCATION/TRAINING PROGRAM

## 2024-11-21 PROCEDURE — 1100000001 HC PRIVATE ROOM DAILY

## 2024-11-21 PROCEDURE — 99024 POSTOP FOLLOW-UP VISIT: CPT | Performed by: STUDENT IN AN ORGANIZED HEALTH CARE EDUCATION/TRAINING PROGRAM

## 2024-11-21 PROCEDURE — 2500000004 HC RX 250 GENERAL PHARMACY W/ HCPCS (ALT 636 FOR OP/ED): Performed by: STUDENT IN AN ORGANIZED HEALTH CARE EDUCATION/TRAINING PROGRAM

## 2024-11-21 PROCEDURE — 73020 X-RAY EXAM OF SHOULDER: CPT | Mod: LEFT SIDE | Performed by: STUDENT IN AN ORGANIZED HEALTH CARE EDUCATION/TRAINING PROGRAM

## 2024-11-21 PROCEDURE — 73020 X-RAY EXAM OF SHOULDER: CPT | Mod: LT

## 2024-11-21 PROCEDURE — 2500000001 HC RX 250 WO HCPCS SELF ADMINISTERED DRUGS (ALT 637 FOR MEDICARE OP): Performed by: STUDENT IN AN ORGANIZED HEALTH CARE EDUCATION/TRAINING PROGRAM

## 2024-11-21 PROCEDURE — 80053 COMPREHEN METABOLIC PANEL: CPT | Performed by: STUDENT IN AN ORGANIZED HEALTH CARE EDUCATION/TRAINING PROGRAM

## 2024-11-21 PROCEDURE — 51701 INSERT BLADDER CATHETER: CPT

## 2024-11-21 PROCEDURE — 2500000005 HC RX 250 GENERAL PHARMACY W/O HCPCS

## 2024-11-21 PROCEDURE — 99231 SBSQ HOSP IP/OBS SF/LOW 25: CPT | Performed by: STUDENT IN AN ORGANIZED HEALTH CARE EDUCATION/TRAINING PROGRAM

## 2024-11-21 RX ORDER — SODIUM CHLORIDE 9 MG/ML
INJECTION, SOLUTION INTRAMUSCULAR; INTRAVENOUS; SUBCUTANEOUS
Status: COMPLETED
Start: 2024-11-21 | End: 2024-11-21

## 2024-11-21 RX ORDER — PANTOPRAZOLE SODIUM 40 MG/10ML
INJECTION, POWDER, LYOPHILIZED, FOR SOLUTION INTRAVENOUS
Status: COMPLETED
Start: 2024-11-21 | End: 2024-11-21

## 2024-11-21 RX ORDER — KETOROLAC TROMETHAMINE 30 MG/ML
15 INJECTION, SOLUTION INTRAMUSCULAR; INTRAVENOUS ONCE
Status: COMPLETED | OUTPATIENT
Start: 2024-11-22 | End: 2024-11-22

## 2024-11-21 ASSESSMENT — PAIN SCALES - GENERAL
PAINLEVEL_OUTOF10: 8
PAINLEVEL_OUTOF10: 7
PAINLEVEL_OUTOF10: 8
PAINLEVEL_OUTOF10: 0 - NO PAIN
PAINLEVEL_OUTOF10: 8
PAINLEVEL_OUTOF10: 10 - WORST POSSIBLE PAIN
PAINLEVEL_OUTOF10: 8

## 2024-11-21 ASSESSMENT — PAIN DESCRIPTION - DESCRIPTORS
DESCRIPTORS: DISCOMFORT
DESCRIPTORS: DISCOMFORT

## 2024-11-21 ASSESSMENT — PAIN - FUNCTIONAL ASSESSMENT
PAIN_FUNCTIONAL_ASSESSMENT: 0-10

## 2024-11-21 ASSESSMENT — PAIN DESCRIPTION - LOCATION
LOCATION: ABDOMEN
LOCATION: ABDOMEN
LOCATION: THROAT

## 2024-11-21 ASSESSMENT — PAIN SCALES - WONG BAKER: WONGBAKER_NUMERICALRESPONSE: HURTS WORST

## 2024-11-21 ASSESSMENT — PAIN DESCRIPTION - ORIENTATION: ORIENTATION: LEFT

## 2024-11-21 NOTE — CARE PLAN
The clinical goals for the shift include Pt will maintain SpO2 greater than 92%, wean oxygen supplementation, and no complications from surgery throughout the shift    Clinical goals met:    Problem: Pain - Adult  Goal: Verbalizes/displays adequate comfort level or baseline comfort level  11/21/2024 0527 by Joyce Perez RN  Outcome: Progressing  11/21/2024 0314 by Joyce Perez RN  Flowsheets (Taken 11/21/2024 0232)  Verbalizes/displays adequate comfort level or baseline comfort level:   Encourage patient to monitor pain and request assistance   Assess pain using appropriate pain scale   Implement non-pharmacological measures as appropriate and evaluate response   Administer analgesics based on type and severity of pain and evaluate response     Problem: Chronic Conditions and Co-morbidities  Goal: Patient's chronic conditions and co-morbidity symptoms are monitored and maintained or improved  Outcome: Progressing     Problem: Skin  Goal: Decreased wound size/increased tissue granulation at next dressing change  11/21/2024 0527 by Joyce Perez RN  Outcome: Progressing  11/21/2024 0314 by Joyce Perez RN  Flowsheets (Taken 11/21/2024 0232)  Decreased wound size/increased tissue granulation at next dressing change: Promote sleep for wound healing  Goal: Participates in plan/prevention/treatment measures  11/21/2024 0527 by Joyce Perez RN  Outcome: Progressing  11/21/2024 0314 by Joyce Perez RN  Flowsheets (Taken 11/21/2024 0232)  Participates in plan/prevention/treatment measures: Elevate heels  Goal: Prevent/manage excess moisture  11/21/2024 0527 by Joyce Perez RN  Outcome: Progressing  11/21/2024 0314 by Joyce Perez RN  Flowsheets (Taken 11/21/2024 0232)  Prevent/manage excess moisture: Monitor for/manage infection if present  Goal: Prevent/minimize sheer/friction injuries  11/21/2024 0527 by Joyce Perez RN  Outcome: Progressing  11/21/2024  0314 by Joyce Perez RN  Flowsheets (Taken 11/21/2024 0232)  Prevent/minimize sheer/friction injuries:   Turn/reposition every 2 hours/use positioning/transfer devices   Use pull sheet  Goal: Promote/optimize nutrition  11/21/2024 0527 by Joyce Perez RN  Outcome: Progressing  11/21/2024 0314 by Joyce Perez RN  Flowsheets (Taken 11/21/2024 0232)  Promote/optimize nutrition: Discuss with provider if NPO > 2 days  Goal: Promote skin healing  11/21/2024 0527 by Joyce Perez RN  Outcome: Progressing  11/21/2024 0314 by Joyce Perez RN  Flowsheets (Taken 11/21/2024 0232)  Promote skin healing:   Assess skin/pad under line(s)/device(s)   Ensure correct size (line/device) and apply per  instructions   Rotate device position/do not position patient on device   Turn/reposition every 2 hours/use positioning/transfer devices     Problem: Pain  Goal: Takes deep breaths with improved pain control throughout the shift  Outcome: Progressing  Goal: Turns in bed with improved pain control throughout the shift  Outcome: Progressing  Goal: Free from opioid side effects throughout the shift  Outcome: Progressing  Goal: Free from acute confusion related to pain meds throughout the shift  Outcome: Progressing     Problem: Fall/Injury  Goal: Be free from injury by end of the shift  Outcome: Progressing     Problem: Respiratory  Goal: Clear secretions with interventions this shift  11/21/2024 0527 by Joyce Perez RN  Outcome: Progressing  11/21/2024 0314 by Joyce Perez RN  Flowsheets (Taken 11/21/2024 0232)  Clear secretions with interventions this shift:   Suctioning   Encourage/provide pulmonary hygiene/secretion clearance  Goal: No signs of respiratory distress (eg. Use of accessory muscles. Peds grunting)  Outcome: Progressing  Goal: Wean oxygen to maintain O2 saturation per order/standard this shift  Outcome: Progressing

## 2024-11-21 NOTE — CONSULTS
Reason For Consult  Post operative ICU management    History Of Present Illness  Oneyda Parmar is a 69 y.o. 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 Alex for ID and Dr. Duenas for pulmonary), paraesophageal hiatal hernia s/p repair and Nissen fundoplication with dysphagia due to recurrent hernia presenting post op from a repeat hiatal hernia repair. She is admitted to the ICU post operatively.    Ms. Parmar was planned for a surgical repair of her hernia in December, however she was admitted to [facility] last week with worsening dysphagia, nausea and vomiting c/b aspiration pneumonitis. Post discharge, she had continued symptoms and presented to the [facility] ED 11/20 with similar complaints and inability to keep food down. She was taken to the OR by Dr. Chaudhary for repair the same day which was completed without complication aside from a small intra-op PNX which resolved. Post operatively, Ms. Parmar is reporting incisional and pleuritic pain but has no other complaints. She reports that she has not started her carvidopa/levodopa yet due to inability to keep down pills. She has completed her antibiotics for her PSA/[place] infections including the inhaled tobramycin.        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), COVID-19 (09/21/2022), COVID-19 virus infection (06/21/2023), Dysphagia, unspecified, Encounter for screening for malignant neoplasm of cervix, Localized edema (05/04/2022), Parkinson's disease (Multi) (06/21/2022), Personal history of other diseases of the digestive system (07/18/2018), Personal history of other diseases of the musculoskeletal  system and connective tissue, Personal history of other diseases of the respiratory system (10/08/2017), Personal history of other endocrine, nutritional and metabolic disease (08/08/2016), Personal history of other endocrine, nutritional and metabolic disease (10/08/2017), Personal history of other malignant neoplasm of skin, Personal history of other medical treatment, Personal history of pneumonia (recurrent) (03/18/2020), Personal history of pneumonia (recurrent) (01/29/2021), Pulmonary mycobacterial infection (Multi), and Raynaud's syndrome without gangrene.    Surgical History  She has a past surgical history that includes Other surgical history (02/19/2015); Colonoscopy (07/29/2015); Other surgical history (06/06/2019); Gastric fundoplication (07/19/2018); Other surgical history (06/23/2017); Other surgical history (06/23/2017); Tonsillectomy (06/23/2017); Sinus surgery (06/23/2017); and Breast lumpectomy (06/23/2017).     Social History  She reports that she has never smoked. She has never been exposed to tobacco smoke. She has never used smokeless tobacco. She reports that she does not currently use alcohol. She reports that she does not use drugs.    Family History  Family History   Problem Relation Name Age of Onset    Other (cerebrovascular accident) Mother      COPD Father      Prostate cancer Brother          Allergies  Doxycycline, Prednisone, and Topiramate    Review of Systems  Review of Systems   Unable to perform ROS: Other (limited due to acute pain)   Respiratory:  Positive for shortness of breath. Negative for cough.    Cardiovascular:  Negative for chest pain.   Gastrointestinal:  Positive for abdominal pain. Negative for nausea and vomiting.          Physical Exam  Physical Exam  Constitutional:       General: She is not in acute distress.     Appearance: She is not toxic-appearing.   HENT:      Head: Normocephalic and atraumatic.      Nose: No rhinorrhea.      Mouth/Throat:      Mouth:  "Mucous membranes are moist.   Eyes:      General: No scleral icterus.     Extraocular Movements: Extraocular movements intact.      Conjunctiva/sclera: Conjunctivae normal.   Cardiovascular:      Rate and Rhythm: Normal rate and regular rhythm.      Heart sounds: No murmur heard.     No friction rub. No gallop.   Pulmonary:      Comments: Poor inspiratory effort due to pain, upper lobes CTA, poor air movement in bilateral lower lobes  Abdominal:      General: There is no distension.      Palpations: Abdomen is soft.      Tenderness: There is abdominal tenderness.      Comments: Incision sites clean/dry/intact, ISABEL drain with minimal serosanguinous drainage   Musculoskeletal:      Cervical back: Normal range of motion.      Right lower leg: No edema.      Left lower leg: No edema.   Skin:     General: Skin is warm and dry.      Findings: No rash.   Neurological:      Comments: Lethargic, awakens to voice, moves all extremities, oriented, grossly non-focal   Psychiatric:         Mood and Affect: Mood normal.         Behavior: Behavior normal.            Last Recorded Vitals  Blood pressure 112/61, pulse 97, temperature 36.1 °C (97 °F), temperature source Temporal, resp. rate 16, height 1.651 m (5' 5\"), weight 66.7 kg (147 lb), SpO2 93%.    Relevant Results  Results for orders placed or performed during the hospital encounter of 11/20/24 (from the past 24 hours)   CBC and Auto Differential   Result Value Ref Range    WBC 19.3 (H) 4.4 - 11.3 x10*3/uL    nRBC 0.0 0.0 - 0.0 /100 WBCs    RBC 4.66 4.00 - 5.20 x10*6/uL    Hemoglobin 14.2 12.0 - 16.0 g/dL    Hematocrit 41.8 36.0 - 46.0 %    MCV 90 80 - 100 fL    MCH 30.5 26.0 - 34.0 pg    MCHC 34.0 32.0 - 36.0 g/dL    RDW 12.3 11.5 - 14.5 %    Platelets 322 150 - 450 x10*3/uL    Neutrophils % 87.9 40.0 - 80.0 %    Immature Granulocytes %, Automated 0.6 0.0 - 0.9 %    Lymphocytes % 6.4 13.0 - 44.0 %    Monocytes % 4.6 2.0 - 10.0 %    Eosinophils % 0.1 0.0 - 6.0 %    Basophils % " 0.4 0.0 - 2.0 %    Neutrophils Absolute 17.02 (H) 1.20 - 7.70 x10*3/uL    Immature Granulocytes Absolute, Automated 0.12 0.00 - 0.70 x10*3/uL    Lymphocytes Absolute 1.23 1.20 - 4.80 x10*3/uL    Monocytes Absolute 0.88 0.10 - 1.00 x10*3/uL    Eosinophils Absolute 0.01 0.00 - 0.70 x10*3/uL    Basophils Absolute 0.07 0.00 - 0.10 x10*3/uL   Comprehensive metabolic panel   Result Value Ref Range    Glucose 114 (H) 74 - 99 mg/dL    Sodium 136 136 - 145 mmol/L    Potassium 4.0 3.5 - 5.3 mmol/L    Chloride 97 (L) 98 - 107 mmol/L    Bicarbonate 28 21 - 32 mmol/L    Anion Gap 15 10 - 20 mmol/L    Urea Nitrogen 19 6 - 23 mg/dL    Creatinine 1.31 (H) 0.50 - 1.05 mg/dL    eGFR 44 (L) >60 mL/min/1.73m*2    Calcium 10.0 8.6 - 10.3 mg/dL    Albumin 4.4 3.4 - 5.0 g/dL    Alkaline Phosphatase 129 33 - 136 U/L    Total Protein 8.3 (H) 6.4 - 8.2 g/dL    AST 17 9 - 39 U/L    Bilirubin, Total 1.3 (H) 0.0 - 1.2 mg/dL    ALT 16 7 - 45 U/L   Protime-INR   Result Value Ref Range    Protime 12.8 9.8 - 12.8 seconds    INR 1.1 0.9 - 1.1   aPTT   Result Value Ref Range    aPTT 32 27 - 38 seconds   Type And Screen   Result Value Ref Range    ABO TYPE B     Rh TYPE POS     ANTIBODY SCREEN NEG           Assessment/Plan   Oneyda Parmar is a 69 y.o. 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 Alex for ID and Dr. Duenas for pulmonary), paraesophageal hiatal hernia s/p repair and Nissen fundoplication with dysphagia due to recurrent hernia presenting post op from a repeat hiatal hernia repair. She is admitted to the ICU post operatively.      Neuro  #Parkinson's disease - tremor predominant  #Post op pain  #Migraines on propranolol ppx  -Has not yet  started Sinimet, can initiate at 1/2 dose when able to take pills  -Multi-modal pain control per surgery protocol  -Resume propranolol and pramipexole when able to take pills    GI  #Hiatal hernia s/p repeat repair 11/20  -Antibiotic ppx per surgery  -NGT to LIWS  -NPO  -Pain control as above  -IV PPI every day  -ISABEL drain in place, monitor output    CV  -No acute issues    Pulm/immunology  #Bronchiectasis due to CVID with recurrent infections - not in exacerbation  #CVID on IVIG  #Post op respiratory insufficiency  -IS  -Wean O2 as tolerated, goal saturation 92-95%  -Pulm hygiene  -IVIG dose due next Wednesday   -PRN albuterol  -Not taking Spiriva, will not resume    Nephro  #KALI - likely hypovolemia due to lack of PO intake from severe dysphagia   -LR at 100cc/hr  -Trend RFP  -Strict I/Os    Heme  #Leukocytosis - suspect reactive in setting of intractable vomiting and possible recurrent aspiration pneumonitis, no clear signs of infection  -Trend CBC  -Antibiotics if indication arises  -Monitor for increasing sputum, continued up-trend of WBC count, fever, or other signs of infection    Endo  -No acute issues  -Goal glucose 140-180    ID  #[place] bronchialis/PSA infections s/p prolonged antibiotics  -Off antibiotics, monitor for signs of worsening sputum production    Lines/tubes: PIV, mediastinal ISABEL drain 11/20  Prophylaxis: PPI, subcutaneous heparin  Drips: none  Fluids: LR 100cc/hr  Oxygen: simple face mask  Nutrition: NPO  Restraints: none  Code status: full  Dispo: ICU      I spent 45 minutes in the professional and overall care of this patient.      Linda Dennis DO

## 2024-11-21 NOTE — PROGRESS NOTES
"Oneyda Parmar is a 69 y.o. female on day 1 of admission presenting with Paraesophageal hernia with gastroesophageal reflux.    Subjective   Patient seen this morning.  Pain well-controlled.  No nausea vomiting.  Admitted to the ICU for monitoring overnight. NG tube to low intermittent wall suction     Objective     Physical Exam  Patient is in no acute distress  No respiratory distress  Chest is clear to auscultation  Abdomen is soft, not tender, not distended  No guarding   Incisions are clean dry and intact  Patient is alert and oriented x 3     Last Recorded Vitals  Blood pressure 101/58, pulse 68, temperature 37.1 °C (98.8 °F), temperature source Temporal, resp. rate 16, height 1.651 m (5' 5\"), weight 74 kg (163 lb 2.3 oz), SpO2 96%.  Intake/Output last 3 Shifts:  I/O last 3 completed shifts:  In: 3515.4 (47.5 mL/kg) [I.V.:2285.4 (30.9 mL/kg); NG/GT:30; IV Piggyback:1200]  Out: 716 (9.7 mL/kg) [Urine:410 (0.2 mL/kg/hr); Emesis/NG output:250; Drains:26; Blood:30]  Weight: 74 kg     Relevant Results               Assessment/Plan   Assessment & Plan  Paraesophageal hernia with gastroesophageal reflux    Renal insufficiency    Pneumothorax    Assessment: 69-year-old female with a prior history of a hiatal hernia repair with a Niesen fundoplication who presented with a recurrent acutely incarcerated hiatal hernia.  Patient is now postop day 1 status post a laparoscopic hiatal hernia repair.  She had a transient In the thorax postop and was being managed in the ICU.  She is now being downgraded and is doing well    Plan  - Upper GI  - AM labs  -Advance diet once upper GI unremarkable  - Nasogastric tube to low intermittent wall suction  - Ambulate as needed  - Incentive spirometry  - DVT prophylaxis         I spent 60 minutes in the professional and overall care of this patient.    Julio Cooper MD  "

## 2024-11-21 NOTE — OP NOTE
REPAIR OF INCARCERATED  PARAESOPHAGEAL HERNIA, DIAGNOSTIC LAPAROSCOPY, EGD/ TAPBLOCK Operative Note     Date: 2024  OR Location: [facility]    Name: Oneyda Parmar, : 1955, Age: 69 y.o., MRN: [id number], Sex: female    Diagnosis  Pre-op Diagnosis      * Paraesophageal hernia with gastroesophageal reflux [K44.9, K21.9] Post-op Diagnosis     * Paraesophageal hernia with gastroesophageal reflux [K44.9, K21.9]     Procedures  REPAIR OF INCARCERATED  PARAESOPHAGEAL HERNIA, DIAGNOSTIC LAPAROSCOPY, EGD/ TAPBLOCK  03518 - [place] LAPS RPR PARAESPHGL HRNA INCL FUNDPLSTY W/MESH    [place] ESOPHAGOGASTRODUODENOSCOPY TRANSORAL DIAGNOSTIC [49499]  Surgeons      * Husam Chaudhary - Primary    Resident/Fellow/Other Assistant:  Surgeons and Role:     * Julio Cooper MD - Assisting    Staff:   Circulator: Trina  Surgical Assistant: Ericka Meekub Person: Aziza    Anesthesia Staff: Anesthesiologist: Dash Martin MD  CRNA: MARTHA Valdivia-CRNA    Procedure Summary  Anesthesia: General  ASA: III  Estimated Blood Loss: 50 mL  Intra-op Medications:   Administrations occurring from 1800 to 1950 on 24:   Medication Name Total Dose   bupivacaine PF 0.25 % (Marcaine) 0.25 % (2.5 mg/mL) injection 25 mL   cloNIDine PF (Duraclon) injection 100 mcg   lactated Ringer's infusion Cannot be calculated   scopolamine (Transderm-Scop) patch 1 patch 1 patch   ceFAZolin (Ancef) 2 g in dextrose (iso)  mL 2 g   ceFAZolin (Ancef) in dextrose (iso) IV  - Omnicell Override Pull Cannot be calculated   albumin human 5 % 250 mL   albuterol (Proventil, Ventolin, ProAir) inhaler 108 (90 BASE) mcg/act 2 puff   fentaNYL (Sublimaze) injection 50 mcg/mL 100 mcg   lidocaine (cardiac) injection 2% prefilled syringe 100 mg   phenylephrine 100 mcg/mL syringe 10 mL (prefilled) 200 mcg   propofol (Diprivan) injection 10 mg/mL 200 mg   rocuronium (ZeMuron) 50 mg/5 mL injection 85 mg   succinylcholine chloride injection syringe 200 mg/10 ml 180 mg               Anesthesia Record               Intraprocedure I/O Totals          Intake    lactated Ringer's infusion 1400.00 mL    Total Intake 1400 mL       Output    Est. Blood Loss 30 mL    NG/OG Tube Output 250 mL    Total Output 280 mL       Net    Net Volume 1120 mL          Specimen:   ID Type Source Tests Collected by Time   1 : HERNIA SAC Tissue HERNIA SAC SURGICAL PATHOLOGY EXAM Husam Chaudhary MD 11/20/2024 2009                 Drains and/or Catheters:   Closed/Suction Drain 1 LUQ Bulb 19 Fr. (Active)   Site Description Unable to view 11/21/24 0400   Dressing Status Clean;Dry;Occlusive 11/21/24 0400   Drainage Appearance Dark red 11/21/24 0400   Status To bulb suction 11/21/24 0400   Output (mL) 1 mL 11/21/24 0700       NG/OG/Feeding Tube NG - Louisa sump 18 Fr Right nostril (Active)   Tube Status Low intermittent suction 11/21/24 0400   Placement Verification X-ray 11/21/24 0400   Distal Tube Measurement 60 cm 11/21/24 0400   Site Assessment Clean;Dry;Intact 11/21/24 0400   Drainage Appearance Brown;Coffee ground 11/21/24 0400   NG/OG Interventions Irrigated 11/21/24 0400   Irrigant Tap water 11/21/24 0400   Response To Intervention No resistance met 11/21/24 0400   Tube Securement Taped to nostril center 11/21/24 0400   Intake - Flush (mL) 30 mL 11/21/24 0400       [REMOVED] External Urinary Catheter Female (Removed)   External Catheter Status In place 11/21/24 0000   Output (mL) 0 mL 11/21/24 0300       Tourniquet Times:         Implants:     Findings: Incarcerated paraesophageal hernia and Nissen wrap    Indications: Oneyda Parmar is an 69 y.o. female who is having surgery for Paraesophageal hernia with gastroesophageal reflux [K44.9, K21.9].  Nausea, vomiting, dysphagia ,abdominal pain, incarcerated paraesophageal hiatal hernia    The patient was seen in the preoperative area. The risks, benefits, complications, treatment options, non-operative alternatives, expected recovery and outcomes were  discussed with the patient. The possibilities of reaction to medication, pulmonary aspiration, injury to surrounding structures, bleeding, recurrent infection, the need for additional procedures, failure to diagnose a condition, and creating a complication requiring transfusion or operation were discussed with the patient. The patient concurred with the proposed plan, giving informed consent.  The site of surgery was properly noted/marked if necessary per policy. The patient has been actively warmed in preoperative area. Preoperative antibiotics have been ordered and given within 1 hours of incision. Venous thrombosis prophylaxis have been ordered including bilateral sequential compression devices and chemical prophylaxis    Procedure Details: Patient is a 69-year-old female with history of paraesophageal hiatal hernia repair with Nissen fundoplication 6 years ago for last many months has been having recurrent symptoms and was found to have a recurrent paraesophageal hiatal hernia symptoms were progressively getting worse for the last 2 weeks she was not even able to eat properly and lately even liquids were coming back.  Patient also was recently being treated for suspected aspiration pneumonia and had chronic cough complaints.  She presented to the emergency room and was noted to have leukocytosis, tachycardia and decision was made to take the patient for a diagnostic laparoscopy on urgent basis.  Risk-benefit alternatives were discussed with the patient at length and all questions were answered.  She was given subcu heparin preoperatively, after anesthesia evaluation she was taken to the OR, placed in supine position on the table, preoperative huddle was done, SCDs were placed, antibiotics were given, general anesthesia was established with aspiration precautions and timeout was done.  An endoscopy was performed which revealed copious amount of liquid material in the esophagus which was aspirated, a recurrent  hiatal hernia was noted and constriction at the slipped wrap site was noted, that area was pretty tight scope however could pass through and rest of the stomach was rather unremarkable in appearance.  Mucosa seemed viable.  Now we did standard, sterile preparation and draping of abdominal wall.  Entry into abdominal cavity was obtained using 5 mm optical trocar in the left midclavicular line without technical problems, pneumoperitoneum was established, there was no iatrogenic injuries at the entry site, additional 5 mm port was placed in the right subcostal area, left axillary line and 12 mm port was placed in the right paramedian above umbilicus area.  Patient was placed in reverse November position.  Adhesions of the omentum were taken down.  Left lobe of liver was retracted with Andres liver retractor through the epigastrium.  Inspection of hiatus revealed a recurrent hernia.  There was significant scarring at the hiatus level between the wrap and the crura.  Using careful dissection right elisabeth was identified and dissection was done medial to right elisabeth then anteriorly then onto the left elisabeth.  Adhesions between the omentum and the wrap on the left side were taken down.  There was significant scarring posteriorly at the previous hiatal hernia repair site using tedious and careful dissection that area was freed up.  Eventually we were able to free up the wrap now we started the dissection in the hiatus.  Hernia sac was identified and dissected from the surrounding pleural attachments.  Once it was reduced most of it was excised.  Extensive scarring was noted throughout this process and it was unclear whether vagus nerves were preserved or not.  Distal esophagus was circumferentially dissected for many centimeters.  Now the wrap was examined it was twisted and due to chronic incarceration there was a part of stomach fundus and body which had herniated through the wrap proximally.  It was clear that wrap was not  salvageable.  Using a 60 mm blue load the wrap at suture sites was divided then wrap was undone and gastric fundus which had been brought behind was reduced, again extensive scarring was encountered throughout this process however we were able to carefully dissected free and reestablish the normal gastric anatomy.  Patient was noted to be hypoxic and auscultation of the lung fields revealed no breath sounds on the left side and as we had violated the pleura on both sides so it was expected to have capnothorax.I placed a 19 Tamazight Jose drain through the hiatus into the pleural cavity and that immediately improved the oxygenation and ventilation of the patient.  The drain was brought out through a stab incision in the left subcostal margin and connected to suction.  At this point an endoscopy was performed to get and stomach was insufflated and placed under waterseal and no air bubbles were noted.  There was no obvious mucosal injury or perforation noted on the endoscopic examination as well.  Hiatal hernia repair was done using 0 Surgidek sutures in figure-of-eight fashion posteriorly, 3 sutures were used.  We made sure no excessive narrowing was created at the hiatus.  We had at least 3 cm of intra-abdominal esophageal length.  Due to significant edema of the tissue and dissection trauma I decided not to perform any gastropexy or any further wrap etc.  There was also high risk of migration of the wrap due to chronic cough patient has been having and that would put the patient again at the risk of being in the same situation.  After this tap block was performed.  Fascial defect at 12 mm port site was closed with 0 Vicryl using Endo passer under laparoscopic vision.  After final satisfactory examination abdominal cavity liver retractor and trocars were removed.  Skin was closed with 3-0 Monocryl.  Drain was secured with 3-0 nylon at the exit site.  Dermabond was applied on the incisions.  Instrument, needle, sponge  counts were correct at conclusion of the operation.  Patient was extubated in the OR and transferred to ICU in stable condition.      Complications:  None; patient tolerated the procedure well.    Disposition: PACU - hemodynamically stable.  Condition: stable         Task Performed by RNFA or Surgical Assistant:  Assistance with camera and skin closure          Additional Details: Dr. Pardo was scrubbed and actively assisted in the laparoscopic, endoscopic components of the operation.  There was no qualified resident available.    Attending Attestation: I was present and scrubbed for the entire procedure.    Husam Chaudhary  Phone Number: [phone]

## 2024-11-21 NOTE — PROGRESS NOTES
"   11/21/24 1514   Discharge Planning   Living Arrangements Spouse/significant other   Support Systems Family members   Type of Residence Private residence   Number of Stairs to Enter Residence 1   Number of Stairs Within Residence 12   Do you have animals or pets at home? No     LSW met with the pt at bedside. LSW made introduction and explained role. Pt oriented x3. Mt reports residing in a tri-level home with her spouse. Pt reporting she is able to complete all \"ADL's/ADL's independently. With the pt's permission, bedside RN discloses on some social concerns relative to the pt's marriage. Pt verbalizes being safe in the home but would like information regarding granting POA to the pt's siblings. LSW provides the pt with POA paperwork and contact information prior to floor transfer. LSW to sign off at transfer but encourages the pt to make contact with any further concerns.   PCP Miguel Alvarado -   "

## 2024-11-21 NOTE — ANESTHESIA POSTPROCEDURE EVALUATION
Patient: Oneyda Parmar    Procedure Summary       Date: 11/20/24 Room / Location: [facility] 08 / [facility]    Anesthesia Start: 1840 Anesthesia Stop: 2040    Procedure: REPAIR OF INCARCERATED  PARAESOPHAGEAL HERNIA, DIAGNOSTIC LAPAROSCOPY, EGD/ TAPBLOCK Diagnosis:       Paraesophageal hernia with gastroesophageal reflux      (Paraesophageal hernia with gastroesophageal reflux [K44.9, K21.9])    Surgeons: Husam Chaudhary MD Responsible Provider: Dash Martin MD    Anesthesia Type: general ASA Status: 3 - Emergent            Anesthesia Type: general    Vitals Value Taken Time   /63 11/20/24 2040   Temp 36.0 11/20/24 2040   Pulse 111 11/20/24 2040   Resp 16 11/20/24 2040   SpO2 97% 11/20/24 2040       Anesthesia Post Evaluation    Patient location during evaluation: ICU  Patient participation: complete - patient participated  Level of consciousness: sleepy but conscious  Pain score: 0  Pain management: adequate  Airway patency: patent  Cardiovascular status: acceptable and hemodynamically stable  Respiratory status: acceptable, face mask, nonlabored ventilation and spontaneous ventilation  Hydration status: acceptable  Postoperative Nausea and Vomiting: none      There were no known notable events for this encounter.

## 2024-11-21 NOTE — CARE PLAN
Problem: Pain - Adult  Goal: Verbalizes/displays adequate comfort level or baseline comfort level  Outcome: Progressing     Problem: Chronic Conditions and Co-morbidities  Goal: Patient's chronic conditions and co-morbidity symptoms are monitored and maintained or improved  Outcome: Progressing     Problem: Skin  Goal: Decreased wound size/increased tissue granulation at next dressing change  Outcome: Progressing  Flowsheets (Taken 11/21/2024 1535)  Decreased wound size/increased tissue granulation at next dressing change: Promote sleep for wound healing  Goal: Participates in plan/prevention/treatment measures  Outcome: Progressing  Flowsheets (Taken 11/21/2024 1535)  Participates in plan/prevention/treatment measures: Elevate heels  Goal: Prevent/manage excess moisture  Outcome: Progressing  Flowsheets (Taken 11/21/2024 1535)  Prevent/manage excess moisture: Cleanse incontinence/protect with barrier cream  Goal: Prevent/minimize sheer/friction injuries  Outcome: Progressing  Flowsheets (Taken 11/21/2024 1535)  Prevent/minimize sheer/friction injuries: Turn/reposition every 2 hours/use positioning/transfer devices  Goal: Promote/optimize nutrition  Outcome: Progressing  Flowsheets (Taken 11/21/2024 1535)  Promote/optimize nutrition: Monitor/record intake including meals  Goal: Promote skin healing  Outcome: Progressing  Flowsheets (Taken 11/21/2024 1535)  Promote skin healing: Turn/reposition every 2 hours/use positioning/transfer devices     Problem: Pain  Goal: Takes deep breaths with improved pain control throughout the shift  Outcome: Progressing  Goal: Turns in bed with improved pain control throughout the shift  Outcome: Progressing  Goal: Free from opioid side effects throughout the shift  Outcome: Progressing  Goal: Free from acute confusion related to pain meds throughout the shift  Outcome: Progressing     Problem: Fall/Injury  Goal: Be free from injury by end of the shift  Outcome: Progressing      Problem: Respiratory  Goal: Clear secretions with interventions this shift  Outcome: Progressing  Goal: No signs of respiratory distress (eg. Use of accessory muscles. Peds grunting)  Outcome: Progressing  Goal: Wean oxygen to maintain O2 saturation per order/standard this shift  Outcome: Progressing   The patient's goals for the shift include      The clinical goals for the shift include pain management

## 2024-11-21 NOTE — PROGRESS NOTES
Oneyda Parmar is a 69 y.o. female on day 1 of admission presenting with Paraesophageal hernia with gastroesophageal reflux.    Subjective:  Seen at bedside.  No overnight events.  Complaining of sore throat and mild abdominal pain incision sites.  ISABEL drain with 30 cc output. Currently on NC 3L. SO2 99%.  Labs this morning WNL.    Physical Exam:  GENERAL: Awake/ alert, cooperative.   HEAD:  Normocephalic, atraumatic.  EYES:  Round and reactive.  ENT:  No nasal discharge, mucous membranes moist and pink. NG tube in place.   NECK:  Atraumatic, no meningismus.  CARDIOVASCULAR:  Regular rate and rhythm, no murmur.  RESPIRATORY:  CTAB, no active work of breathing.   ABDOMEN:  Soft, non distended, minimal tenderness to palpation, no rebound.  ISABEL drain with serosanguineous fluid.  EXTREMITIES:  No peripheral edema, no calf tenderness.  SKIN:  Warm and dry.  NEUROLOGICAL:  Nonfocal grossly.    Remainder of objective data including imaging and laboratory findings were all reviewed.    Admission history:     Assessment and plan:  Oneyda Parmar is a 69 y.o. 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 Alex for ID and Dr. Duenas for pulmonary), paraesophageal hiatal hernia s/p repair and Nissen fundoplication with dysphagia due to recurrent hernia presenting post op from a repeat hiatal hernia repair. She is admitted to the ICU post operatively.       Neuro  #Parkinson's disease - tremor predominant  #Post op pain  #Migraines on propranolol ppx  -Has not yet started Sinimet, can initiate at 1/2 dose when able to take pills  -Multi-modal pain control per surgery protocol  -Resume propranolol and pramipexole when able to take pills      GI  #s/p Repair of Paraesophageal incarcerated hernia and Nissen wrap on 11/20  -Abx: Received cefazolin   -NGT in place, NPO   -Pain control as above  -IV PPI every day  -ISABEL drain in place, monitor output     CV  -No acute issues     Pulm/immunology  #Bronchiectasis due to CVID with recurrent infections - not in exacerbation  #CVID on IVIG  #Post op respiratory insufficiency, resolved   -Wean O2 as tolerated, goal saturation 92-95%.   -Incentive spirometer  -IVIG dose due next Wednesday   -PRN albuterol  -Not taking Spiriva, will not resume     Nephro  #KALI, likely pre renal   -poor po intake due to dysphagia    -Continue with LR at 100cc/hr  -Trend RFP  -Strict I/Os     Heme  #Leukocytosis, suspected reactive   -Hx of intractable vomiting, possible recurrent aspiration  -Monitor for increasing sputum, continued up-trend of WBC count, fever, or other signs of infection    Endo  -No acute issues  -Goal glucose 140-180     ID  #[place] bronchialis/PSA infections s/p prolonged antibiotics  -Off antibiotics, monitor for signs of worsening sputum production     Lines/tubes: PIV,  ISABEL drain 11/20  Prophylaxis: PPI, subcutaneous heparin  Drips: none  Fluids: LR 100cc/hr  Oxygen: NC  Nutrition: NPO  Restraints: none  Code status: full  Dispo: ICU to be downgraded to surgery floor, orders placed.     This document was created using dragon dictation and may contain unintended error.      Kelsi Redding MD

## 2024-11-22 ENCOUNTER — APPOINTMENT (OUTPATIENT)
Dept: RADIOLOGY | Facility: HOSPITAL | Age: 69
DRG: 327 | End: 2024-11-22
Payer: MEDICARE

## 2024-11-22 ENCOUNTER — PHARMACY VISIT (OUTPATIENT)
Dept: PHARMACY | Facility: CLINIC | Age: 69
End: 2024-11-22
Payer: MEDICARE

## 2024-11-22 LAB
ALBUMIN SERPL BCP-MCNC: 3.1 G/DL (ref 3.4–5)
ALP SERPL-CCNC: 85 U/L (ref 33–136)
ALT SERPL W P-5'-P-CCNC: 24 U/L (ref 7–45)
ANION GAP SERPL CALC-SCNC: 14 MMOL/L (ref 10–20)
AST SERPL W P-5'-P-CCNC: 25 U/L (ref 9–39)
BASOPHILS # BLD AUTO: 0.03 X10*3/UL (ref 0–0.1)
BASOPHILS NFR BLD AUTO: 0.3 %
BILIRUB SERPL-MCNC: 0.7 MG/DL (ref 0–1.2)
BUN SERPL-MCNC: 13 MG/DL (ref 6–23)
CALCIUM SERPL-MCNC: 8.6 MG/DL (ref 8.6–10.3)
CHLORIDE SERPL-SCNC: 101 MMOL/L (ref 98–107)
CO2 SERPL-SCNC: 24 MMOL/L (ref 21–32)
CREAT SERPL-MCNC: 0.76 MG/DL (ref 0.5–1.05)
EGFRCR SERPLBLD CKD-EPI 2021: 84 ML/MIN/1.73M*2
EOSINOPHIL # BLD AUTO: 0.76 X10*3/UL (ref 0–0.7)
EOSINOPHIL NFR BLD AUTO: 7.7 %
ERYTHROCYTE [DISTWIDTH] IN BLOOD BY AUTOMATED COUNT: 12.7 % (ref 11.5–14.5)
GLUCOSE SERPL-MCNC: 69 MG/DL (ref 74–99)
HCT VFR BLD AUTO: 34.7 % (ref 36–46)
HGB BLD-MCNC: 11.4 G/DL (ref 12–16)
HOLD SPECIMEN: NORMAL
IMM GRANULOCYTES # BLD AUTO: 0.03 X10*3/UL (ref 0–0.7)
IMM GRANULOCYTES NFR BLD AUTO: 0.3 % (ref 0–0.9)
LYMPHOCYTES # BLD AUTO: 0.89 X10*3/UL (ref 1.2–4.8)
LYMPHOCYTES NFR BLD AUTO: 9 %
MCH RBC QN AUTO: 30.4 PG (ref 26–34)
MCHC RBC AUTO-ENTMCNC: 32.9 G/DL (ref 32–36)
MCV RBC AUTO: 93 FL (ref 80–100)
MONOCYTES # BLD AUTO: 0.5 X10*3/UL (ref 0.1–1)
MONOCYTES NFR BLD AUTO: 5 %
NEUTROPHILS # BLD AUTO: 7.71 X10*3/UL (ref 1.2–7.7)
NEUTROPHILS NFR BLD AUTO: 77.7 %
NRBC BLD-RTO: 0 /100 WBCS (ref 0–0)
PLATELET # BLD AUTO: 267 X10*3/UL (ref 150–450)
POTASSIUM SERPL-SCNC: 3.9 MMOL/L (ref 3.5–5.3)
PROT SERPL-MCNC: 5.8 G/DL (ref 6.4–8.2)
RBC # BLD AUTO: 3.75 X10*6/UL (ref 4–5.2)
SODIUM SERPL-SCNC: 135 MMOL/L (ref 136–145)
WBC # BLD AUTO: 9.9 X10*3/UL (ref 4.4–11.3)

## 2024-11-22 PROCEDURE — RXMED WILLOW AMBULATORY MEDICATION CHARGE

## 2024-11-22 PROCEDURE — 74240 X-RAY XM UPR GI TRC 1CNTRST: CPT | Performed by: RADIOLOGY

## 2024-11-22 PROCEDURE — 80053 COMPREHEN METABOLIC PANEL: CPT | Performed by: SURGERY

## 2024-11-22 PROCEDURE — 2500000004 HC RX 250 GENERAL PHARMACY W/ HCPCS (ALT 636 FOR OP/ED): Performed by: STUDENT IN AN ORGANIZED HEALTH CARE EDUCATION/TRAINING PROGRAM

## 2024-11-22 PROCEDURE — 2500000005 HC RX 250 GENERAL PHARMACY W/O HCPCS: Performed by: SURGERY

## 2024-11-22 PROCEDURE — 2550000001 HC RX 255 CONTRASTS: Performed by: SURGERY

## 2024-11-22 PROCEDURE — 1100000001 HC PRIVATE ROOM DAILY

## 2024-11-22 PROCEDURE — 2500000004 HC RX 250 GENERAL PHARMACY W/ HCPCS (ALT 636 FOR OP/ED): Performed by: SURGERY

## 2024-11-22 PROCEDURE — 2500000001 HC RX 250 WO HCPCS SELF ADMINISTERED DRUGS (ALT 637 FOR MEDICARE OP): Performed by: SURGERY

## 2024-11-22 PROCEDURE — 2500000001 HC RX 250 WO HCPCS SELF ADMINISTERED DRUGS (ALT 637 FOR MEDICARE OP): Performed by: STUDENT IN AN ORGANIZED HEALTH CARE EDUCATION/TRAINING PROGRAM

## 2024-11-22 PROCEDURE — 85025 COMPLETE CBC W/AUTO DIFF WBC: CPT | Performed by: SURGERY

## 2024-11-22 PROCEDURE — 36415 COLL VENOUS BLD VENIPUNCTURE: CPT | Performed by: SURGERY

## 2024-11-22 PROCEDURE — 74240 X-RAY XM UPR GI TRC 1CNTRST: CPT

## 2024-11-22 RX ORDER — ONDANSETRON 4 MG/1
4 TABLET, ORALLY DISINTEGRATING ORAL EVERY 8 HOURS PRN
Qty: 30 TABLET | Refills: 0 | Status: SHIPPED | OUTPATIENT
Start: 2024-11-22

## 2024-11-22 RX ORDER — LIDOCAINE HYDROCHLORIDE 10 MG/ML
0.1 INJECTION, SOLUTION EPIDURAL; INFILTRATION; INTRACAUDAL; PERINEURAL ONCE
Status: DISCONTINUED | OUTPATIENT
Start: 2024-11-22 | End: 2024-11-22

## 2024-11-22 RX ORDER — NYSTATIN 100000 [USP'U]/G
1 POWDER TOPICAL 2 TIMES DAILY
Status: DISCONTINUED | OUTPATIENT
Start: 2024-11-22 | End: 2024-11-24 | Stop reason: HOSPADM

## 2024-11-22 RX ORDER — SODIUM CHLORIDE, SODIUM LACTATE, POTASSIUM CHLORIDE, CALCIUM CHLORIDE 600; 310; 30; 20 MG/100ML; MG/100ML; MG/100ML; MG/100ML
100 INJECTION, SOLUTION INTRAVENOUS CONTINUOUS
Status: DISCONTINUED | OUTPATIENT
Start: 2024-11-22 | End: 2024-11-22

## 2024-11-22 RX ORDER — OXYCODONE HCL 5 MG/5 ML
5 SOLUTION, ORAL ORAL EVERY 6 HOURS PRN
Qty: 140 ML | Refills: 0 | Status: SHIPPED | OUTPATIENT
Start: 2024-11-22

## 2024-11-22 RX ORDER — KETOROLAC TROMETHAMINE 30 MG/ML
15 INJECTION, SOLUTION INTRAMUSCULAR; INTRAVENOUS ONCE
Status: COMPLETED | OUTPATIENT
Start: 2024-11-22 | End: 2024-11-22

## 2024-11-22 RX ORDER — DIATRIZOATE MEGLUMINE AND DIATRIZOATE SODIUM 660; 100 MG/ML; MG/ML
60 SOLUTION ORAL; RECTAL ONCE
Status: COMPLETED | OUTPATIENT
Start: 2024-11-22 | End: 2024-11-22

## 2024-11-22 RX ORDER — GUAIFENESIN/DEXTROMETHORPHAN 100-10MG/5
5 SYRUP ORAL EVERY 4 HOURS PRN
Status: DISCONTINUED | OUTPATIENT
Start: 2024-11-22 | End: 2024-11-24 | Stop reason: HOSPADM

## 2024-11-22 ASSESSMENT — COGNITIVE AND FUNCTIONAL STATUS - GENERAL
DAILY ACTIVITIY SCORE: 21
TURNING FROM BACK TO SIDE WHILE IN FLAT BAD: A LITTLE
WALKING IN HOSPITAL ROOM: A LITTLE
STANDING UP FROM CHAIR USING ARMS: A LITTLE
MOVING TO AND FROM BED TO CHAIR: A LITTLE
DRESSING REGULAR LOWER BODY CLOTHING: A LITTLE
MOBILITY SCORE: 19
TOILETING: A LITTLE
MOVING TO AND FROM BED TO CHAIR: A LITTLE
CLIMB 3 TO 5 STEPS WITH RAILING: A LITTLE
CLIMB 3 TO 5 STEPS WITH RAILING: A LITTLE
DRESSING REGULAR LOWER BODY CLOTHING: A LITTLE
WALKING IN HOSPITAL ROOM: A LITTLE
TURNING FROM BACK TO SIDE WHILE IN FLAT BAD: A LITTLE
HELP NEEDED FOR BATHING: A LITTLE
DAILY ACTIVITIY SCORE: 21
STANDING UP FROM CHAIR USING ARMS: A LITTLE
DRESSING REGULAR UPPER BODY CLOTHING: A LITTLE
MOBILITY SCORE: 19
TOILETING: A LITTLE

## 2024-11-22 ASSESSMENT — PAIN SCALES - GENERAL
PAINLEVEL_OUTOF10: 8
PAINLEVEL_OUTOF10: 8
PAINLEVEL_OUTOF10: 3

## 2024-11-22 ASSESSMENT — PAIN DESCRIPTION - LOCATION: LOCATION: ABDOMEN

## 2024-11-22 NOTE — CARE PLAN
The patient's goals for the shift include  pain management    The clinical goals for the shift include pain management

## 2024-11-22 NOTE — PROGRESS NOTES
"Oneyda Parmar is a 69 y.o. female on day 2 of admission presenting with Paraesophageal hernia with gastroesophageal reflux.    Subjective   Patient seen this morning.  Pain well-controlled.  No nausea or vomiting. Did require straight cath x 1 this AM. AVSS.     Objective     Physical Exam  Patient is in no acute distress  No respiratory distress  Chest is clear to auscultation  Abdomen is soft, not tender, not distended  No guarding   Incisions are clean dry and intact  Patient is alert and oriented x 3     Last Recorded Vitals  Blood pressure 119/62, pulse 76, temperature 36.6 °C (97.9 °F), temperature source Temporal, resp. rate 18, height 1.651 m (5' 5\"), weight 74 kg (163 lb 2.3 oz), SpO2 95%.  Intake/Output last 3 Shifts:  I/O last 3 completed shifts:  In: 3727 (50.4 mL/kg) [I.V.:3397 (45.9 mL/kg); NG/GT:30; IV Piggyback:300]  Out: 1467 (19.8 mL/kg) [Urine:1135 (0.4 mL/kg/hr); Emesis/NG output:250; Drains:52; Blood:30]  Weight: 74 kg     Relevant Results               Assessment/Plan   Assessment & Plan  Paraesophageal hernia with gastroesophageal reflux    Renal insufficiency    Pneumothorax    Assessment: 69-year-old female with a prior history of a hiatal hernia repair with a Niesen fundoplication who presented with a recurrent acutely incarcerated hiatal hernia.  Patient is now postop day 2 status post a laparoscopic hiatal hernia repair.     Plan  - Upper GI today  - AM labs  -Advance diet once upper GI unremarkable  - Ambulate as needed  - Incentive spirometry  - DVT prophylaxis         I spent 60 minutes in the professional and overall care of this patient.    Guanako Hernandes MD  "

## 2024-11-22 NOTE — BRIEF OP NOTE
Date: 2024  OR Location: [facility]    Name: Oneyda Parmar, : 1955, Age: 69 y.o., MRN: [id number], Sex: female    Diagnosis  Pre-op Diagnosis      * Paraesophageal hernia with gastroesophageal reflux [K44.9, K21.9] Post-op Diagnosis     * Paraesophageal hernia with gastroesophageal reflux [K44.9, K21.9]     Procedures  REPAIR OF INCARCERATED  PARAESOPHAGEAL HERNIA, DIAGNOSTIC LAPAROSCOPY, EGD/ TAPBLOCK  55830 - [place] LAPS RPR PARAESPHGL HRNA INCL FUNDPLSTY W/MESH    [place] ESOPHAGOGASTRODUODENOSCOPY TRANSORAL DIAGNOSTIC [62063]  Surgeons      * Husam Chaudhary - Primary    Resident/Fellow/Other Assistant:  Surgeons and Role:     * Julio Cooper MD - Assisting    Staff:   Circulator: Trina  Surgical Assistant: Ericka Solorzano Person: Aziza    Anesthesia Staff: Anesthesiologist: Dash Martin MD  CRNA: MARTHA Valdivia-CRNA    Procedure Summary  Anesthesia: General  ASA: III  Estimated Blood Loss: 50 mL  Intra-op Medications:   Administrations occurring from 1800 to  on 24:   Medication Name Total Dose   bupivacaine PF 0.25 % (Marcaine) 0.25 % (2.5 mg/mL) injection 25 mL   cloNIDine PF (Duraclon) injection 100 mcg   scopolamine (Transderm-Scop) patch 1 patch 1 patch   ceFAZolin (Ancef) 2 g in dextrose (iso)  mL 2 g   ceFAZolin (Ancef) in dextrose (iso) IV  - Omnicell Override Pull Cannot be calculated   lactated Ringer's infusion Cannot be calculated   albumin human 5 % 250 mL   albuterol (Proventil, Ventolin, ProAir) inhaler 108 (90 BASE) mcg/act 2 puff   fentaNYL (Sublimaze) injection 50 mcg/mL 100 mcg   lidocaine (cardiac) injection 2% prefilled syringe 100 mg   phenylephrine 100 mcg/mL syringe 10 mL (prefilled) 200 mcg   propofol (Diprivan) injection 10 mg/mL 200 mg   rocuronium (ZeMuron) 50 mg/5 mL injection 85 mg   succinylcholine chloride injection syringe 200 mg/10 ml 180 mg              Anesthesia Record               Intraprocedure I/O Totals          Intake    lactated Ringer's  infusion 1400.00 mL    Total Intake 1400 mL       Output    Est. Blood Loss 30 mL    NG/OG Tube Output 250 mL    Total Output 280 mL       Net    Net Volume 1120 mL          Specimen:   ID Type Source Tests Collected by Time   1 : HERNIA SAC Tissue HERNIA SAC SURGICAL PATHOLOGY EXAM Husam Chaudhary MD 11/20/2024 2009      Findings: Incarcerated recurrent hiatal hernia containing the stomach and a prior Nissen wrap.  Hiatal hernia was completely reduced, the wrap was divided.  No complications    Complications:  None; patient tolerated the procedure well.     Disposition: PACU - hemodynamically stable.  Condition: stable  Specimens Collected:   ID Type Source Tests Collected by Time   1 : HERNIA SAC Tissue HERNIA SAC SURGICAL PATHOLOGY EXAM Husam Chaudhary MD 11/20/2024 2009     Attending Attestation: I was present and scrubbed for the entire procedure.    Husam Chaudhary  Phone Number: [phone]

## 2024-11-23 LAB
ALBUMIN SERPL BCP-MCNC: 3.3 G/DL (ref 3.4–5)
ALP SERPL-CCNC: 94 U/L (ref 33–136)
ALT SERPL W P-5'-P-CCNC: 19 U/L (ref 7–45)
ANION GAP SERPL CALC-SCNC: 14 MMOL/L (ref 10–20)
AST SERPL W P-5'-P-CCNC: 20 U/L (ref 9–39)
ATRIAL RATE: 117 BPM
BASOPHILS # BLD AUTO: 0.04 X10*3/UL (ref 0–0.1)
BASOPHILS NFR BLD AUTO: 0.5 %
BILIRUB SERPL-MCNC: 0.6 MG/DL (ref 0–1.2)
BUN SERPL-MCNC: 16 MG/DL (ref 6–23)
CALCIUM SERPL-MCNC: 8.7 MG/DL (ref 8.6–10.3)
CHLORIDE SERPL-SCNC: 100 MMOL/L (ref 98–107)
CO2 SERPL-SCNC: 24 MMOL/L (ref 21–32)
CREAT SERPL-MCNC: 0.74 MG/DL (ref 0.5–1.05)
EGFRCR SERPLBLD CKD-EPI 2021: 87 ML/MIN/1.73M*2
EOSINOPHIL # BLD AUTO: 0.59 X10*3/UL (ref 0–0.7)
EOSINOPHIL NFR BLD AUTO: 6.7 %
ERYTHROCYTE [DISTWIDTH] IN BLOOD BY AUTOMATED COUNT: 12.7 % (ref 11.5–14.5)
GLUCOSE SERPL-MCNC: 75 MG/DL (ref 74–99)
HCT VFR BLD AUTO: 34.4 % (ref 36–46)
HGB BLD-MCNC: 11.4 G/DL (ref 12–16)
IMM GRANULOCYTES # BLD AUTO: 0.04 X10*3/UL (ref 0–0.7)
IMM GRANULOCYTES NFR BLD AUTO: 0.5 % (ref 0–0.9)
LYMPHOCYTES # BLD AUTO: 0.99 X10*3/UL (ref 1.2–4.8)
LYMPHOCYTES NFR BLD AUTO: 11.3 %
MCH RBC QN AUTO: 30.3 PG (ref 26–34)
MCHC RBC AUTO-ENTMCNC: 33.1 G/DL (ref 32–36)
MCV RBC AUTO: 92 FL (ref 80–100)
MONOCYTES # BLD AUTO: 0.48 X10*3/UL (ref 0.1–1)
MONOCYTES NFR BLD AUTO: 5.5 %
NEUTROPHILS # BLD AUTO: 6.61 X10*3/UL (ref 1.2–7.7)
NEUTROPHILS NFR BLD AUTO: 75.5 %
NRBC BLD-RTO: 0 /100 WBCS (ref 0–0)
P AXIS: 80 DEGREES
P OFFSET: 212 MS
P ONSET: 164 MS
PLATELET # BLD AUTO: 286 X10*3/UL (ref 150–450)
POTASSIUM SERPL-SCNC: 3.6 MMOL/L (ref 3.5–5.3)
PR INTERVAL: 116 MS
PROT SERPL-MCNC: 6.1 G/DL (ref 6.4–8.2)
Q ONSET: 222 MS
QRS COUNT: 19 BEATS
QRS DURATION: 74 MS
QT INTERVAL: 332 MS
QTC CALCULATION(BAZETT): 463 MS
QTC FREDERICIA: 415 MS
R AXIS: 84 DEGREES
RBC # BLD AUTO: 3.76 X10*6/UL (ref 4–5.2)
SODIUM SERPL-SCNC: 134 MMOL/L (ref 136–145)
T AXIS: 79 DEGREES
T OFFSET: 388 MS
VENTRICULAR RATE: 117 BPM
WBC # BLD AUTO: 8.8 X10*3/UL (ref 4.4–11.3)

## 2024-11-23 PROCEDURE — 2500000004 HC RX 250 GENERAL PHARMACY W/ HCPCS (ALT 636 FOR OP/ED): Performed by: STUDENT IN AN ORGANIZED HEALTH CARE EDUCATION/TRAINING PROGRAM

## 2024-11-23 PROCEDURE — 2500000004 HC RX 250 GENERAL PHARMACY W/ HCPCS (ALT 636 FOR OP/ED): Performed by: SURGERY

## 2024-11-23 PROCEDURE — 1100000001 HC PRIVATE ROOM DAILY

## 2024-11-23 PROCEDURE — 84075 ASSAY ALKALINE PHOSPHATASE: CPT | Performed by: SURGERY

## 2024-11-23 PROCEDURE — 36415 COLL VENOUS BLD VENIPUNCTURE: CPT | Performed by: SURGERY

## 2024-11-23 PROCEDURE — 97530 THERAPEUTIC ACTIVITIES: CPT | Mod: GP

## 2024-11-23 PROCEDURE — 85025 COMPLETE CBC W/AUTO DIFF WBC: CPT | Performed by: SURGERY

## 2024-11-23 PROCEDURE — 2500000001 HC RX 250 WO HCPCS SELF ADMINISTERED DRUGS (ALT 637 FOR MEDICARE OP): Performed by: STUDENT IN AN ORGANIZED HEALTH CARE EDUCATION/TRAINING PROGRAM

## 2024-11-23 PROCEDURE — 97161 PT EVAL LOW COMPLEX 20 MIN: CPT | Mod: GP

## 2024-11-23 PROCEDURE — 2500000001 HC RX 250 WO HCPCS SELF ADMINISTERED DRUGS (ALT 637 FOR MEDICARE OP): Performed by: SURGERY

## 2024-11-23 RX ORDER — DIPHENHYDRAMINE HYDROCHLORIDE 50 MG/ML
25 INJECTION INTRAMUSCULAR; INTRAVENOUS EVERY 6 HOURS PRN
Status: DISCONTINUED | OUTPATIENT
Start: 2024-11-23 | End: 2024-11-24 | Stop reason: HOSPADM

## 2024-11-23 RX ORDER — OXYCODONE HCL 5 MG/5 ML
5 SOLUTION, ORAL ORAL EVERY 6 HOURS PRN
Status: DISCONTINUED | OUTPATIENT
Start: 2024-11-23 | End: 2024-11-24 | Stop reason: HOSPADM

## 2024-11-23 RX ORDER — POTASSIUM CHLORIDE 1.5 G/1.58G
20 POWDER, FOR SOLUTION ORAL 2 TIMES DAILY
Status: DISCONTINUED | OUTPATIENT
Start: 2024-11-23 | End: 2024-11-23

## 2024-11-23 RX ORDER — HYDROMORPHONE HYDROCHLORIDE 0.2 MG/ML
0.2 INJECTION INTRAMUSCULAR; INTRAVENOUS; SUBCUTANEOUS
Status: DISCONTINUED | OUTPATIENT
Start: 2024-11-23 | End: 2024-11-24 | Stop reason: HOSPADM

## 2024-11-23 ASSESSMENT — COGNITIVE AND FUNCTIONAL STATUS - GENERAL
MOVING TO AND FROM BED TO CHAIR: A LITTLE
STANDING UP FROM CHAIR USING ARMS: A LOT
MOBILITY SCORE: 14
CLIMB 3 TO 5 STEPS WITH RAILING: A LOT
TURNING FROM BACK TO SIDE WHILE IN FLAT BAD: A LOT
WALKING IN HOSPITAL ROOM: A LITTLE
MOVING FROM LYING ON BACK TO SITTING ON SIDE OF FLAT BED WITH BEDRAILS: A LOT

## 2024-11-23 ASSESSMENT — PAIN SCALES - GENERAL
PAINLEVEL_OUTOF10: 6
PAINLEVEL_OUTOF10: 7
PAINLEVEL_OUTOF10: 8
PAINLEVEL_OUTOF10: 6
PAINLEVEL_OUTOF10: 4
PAINLEVEL_OUTOF10: 2
PAINLEVEL_OUTOF10: 3
PAINLEVEL_OUTOF10: 5 - MODERATE PAIN

## 2024-11-23 ASSESSMENT — PAIN DESCRIPTION - LOCATION: LOCATION: ABDOMEN

## 2024-11-23 ASSESSMENT — PAIN - FUNCTIONAL ASSESSMENT
PAIN_FUNCTIONAL_ASSESSMENT: 0-10

## 2024-11-23 ASSESSMENT — PAIN DESCRIPTION - ORIENTATION: ORIENTATION: LEFT

## 2024-11-23 NOTE — CARE PLAN
Problem: Skin  Goal: Decreased wound size/increased tissue granulation at next dressing change  11/23/2024 1238 by Estelita Marin RN  Flowsheets (Taken 11/23/2024 1238)  Decreased wound size/increased tissue granulation at next dressing change: Promote sleep for wound healing  11/23/2024 1202 by Estelita Marin RN  Outcome: Progressing  11/23/2024 0743 by Estelita Marin RN  Outcome: Progressing     Problem: Skin  Goal: Participates in plan/prevention/treatment measures  11/23/2024 1238 by Estelita Marin RN  Flowsheets (Taken 11/23/2024 1238)  Participates in plan/prevention/treatment measures:   Discuss with provider PT/OT consult   Increase activity/out of bed for meals  11/23/2024 1202 by Estelita Marin RN  Outcome: Progressing  11/23/2024 0743 by Estelita Marin RN  Outcome: Progressing     Problem: Skin  Goal: Prevent/manage excess moisture  11/23/2024 1238 by Estelita Marin RN  Flowsheets (Taken 11/23/2024 1238)  Prevent/manage excess moisture:   Moisturize dry skin   Monitor for/manage infection if present  11/23/2024 1202 by Estelita Marin RN  Outcome: Progressing  11/23/2024 0743 by Estelita Marin RN  Outcome: Progressing     Problem: Skin  Goal: Prevent/minimize sheer/friction injuries  11/23/2024 1238 by Estelita Marin RN  Flowsheets (Taken 11/23/2024 1238)  Prevent/minimize sheer/friction injuries:   Increase activity/out of bed for meals   Use pull sheet  11/23/2024 1202 by Estelita Marin RN  Outcome: Progressing  11/23/2024 0743 by Estelita Marin RN  Outcome: Progressing     Problem: Skin  Goal: Promote/optimize nutrition  11/23/2024 1238 by Estelita Marin RN  Flowsheets (Taken 11/23/2024 1238)  Promote/optimize nutrition: Offer water/supplements/favorite foods  11/23/2024 1202 by Estelita Marin RN  Outcome: Progressing  11/23/2024 0743 by Estelita Marin RN  Outcome: Progressing     Problem: Skin  Goal: Promote skin healing  11/23/2024 1238 by Estelita Marin,  RN  Flowsheets (Taken 11/23/2024 1238)  Promote skin healing:   Assess skin/pad under line(s)/device(s)   Rotate device position/do not position patient on device  11/23/2024 1202 by Estelita Marin RN  Outcome: Progressing  11/23/2024 0743 by Estelita Marin RN  Outcome: Progressing

## 2024-11-23 NOTE — PROGRESS NOTES
Physical Therapy    Physical Therapy Evaluation    Patient Name: Oneyda Parmar  MRN: [id number]  Today's Date: 11/23/2024   Time Calculation  Start Time: 0822  Stop Time: 0850  Time Calculation (min): 28 min  212/212-A    Assessment/Plan   PT Assessment  PT Assessment Results: Decreased strength, Decreased endurance, Impaired balance, Decreased mobility, Impaired sensation, Decreased skin integrity, Pain, Orthopedic restrictions  Rehab Prognosis: Good  Evaluation/Treatment Tolerance: Patient limited by fatigue, Patient limited by pain  Medical Staff Made Aware: Yes  End of Session Communication: Bedside nurse  Assessment Comment: Pt presents /c above impairments and decline from functional baseline s/p sx /c postop pain, deconditioning and fall risk. Pt will benefit from continued PT services at mod intensity to address above and maximize functional mobility. If pt is able to further progress towards home going goals will update d/c plan as appropriate.  End of Session Patient Position: Bed, 2 rail up, Alarm off, not on at start of session  IP OR SWING BED PT PLAN  Inpatient or Swing Bed: Inpatient  PT Plan  Treatment/Interventions: Bed mobility, Transfer training, Gait training, Balance training, Neuromuscular re-education, Strengthening, Endurance training, Therapeutic exercise, Therapeutic activity  PT Plan: Ongoing PT  PT Frequency: 5 times per week  PT Discharge Recommendations: Moderate intensity level of continued care  PT - OK to Discharge: Yes    Subjective     Current Problem:  1. Paraesophageal hernia with gastroesophageal reflux  Case Request Operating Room: Repair hiatal Hernia Laparoscopy, possible gastric resection, possible feeding tube    Case Request Operating Room: Repair hiatal Hernia Laparoscopy, possible gastric resection, possible feeding tube    Surgical Pathology Exam    Surgical Pathology Exam    oxyCODONE (Roxicodone) 5 mg/5 mL solution    ondansetron ODT (Zofran-ODT) 4 mg disintegrating  tablet        Patient Active Problem List   Diagnosis    Abdominal bloating    Arthritis    Atrophic vaginitis    Back pain, chronic    Chronic low back pain    Bilateral edema of lower extremity    Borderline hyperlipidemia    Bronchiectasis without acute exacerbation (Multi)    Cervical somatic dysfunction    Chronic constipation    Chronic sinusitis    Contusion of ribs, left, sequela    Chronic cough    Contusion, shoulder and upper arm, multiple sites, left, subsequent encounter    CVID (common variable immunodeficiency)    Fibroid, uterine    Gastroesophageal reflux disease    Dysphagia    Hiatal hernia    MCTD (mixed connective tissue disease) (Multi)    Migraine without aura and without status migrainosus, not intractable    Overactive bladder    Palpitations    Pulmonary nodules    Raynaud's syndrome    RLS (restless legs syndrome)    Scleroderma (Multi)    Scoliosis    Senile osteoporosis    Somatic dysfunction of lumbosacral region    Somatic dysfunction of pelvic region    Strain of thoracic region    Systemic lupus erythematosus (Multi)    Thoracic myofascial strain    Vasomotor instability    Venous insufficiency (chronic) (peripheral)    Vitamin D deficiency    Medicare annual wellness visit, subsequent    Parkinson's disease (Multi)    Major depressive disorder, recurrent, mild (CMS-HCC)    Major depressive disorder, recurrent, moderate    Major depressive disorder, recurrent, unspecified    Abnormal CT of the chest    Atypical mycobacterial disease    Postural kyphosis of cervicothoracic region    PONV (postoperative nausea and vomiting)    Pneumonia of right lung due to Pseudomonas species (Multi)    Paraesophageal hernia with gastroesophageal reflux    Aspiration into airway    Tremors of nervous system    Nausea and vomiting    Renal insufficiency    Pneumothorax       General Visit Information:  General  Reason for Referral: PT eval and treat  Referred By: Neena  Past Medical History Relevant to  Rehab: GERD, Parkinson's  Prior to Session Communication: Bedside nurse  Patient Position Received: Bed, 2 rail up, Alarm off, not on at start of session  General Comment: Pt presents with c/o vomiting x2 weeks. H/o fundoplication 6yrs ago. Recent hospital admit for similar s/s, imaging showed recurrent hiatal hernia. Gen.Sx consulted and pt now s/p repair incarcerated paraesophageal hernia 11/20. Initially in ICU postop, now in 212. Pt cleared for therapy by nursing. Pt supine, agreeable.    Home Living:  Home Living  Home Living Comments: Pt and spouse in multi level home. Has rail at each set of stairs.    Prior Level of Function:  Prior Function Per Pt/Caregiver Report  Prior Function Comments: Indep at baseline. Pt drives. Denies AD use or falls. Pt works part time .    Precautions:  Precautions  Precautions Comment: abdominal, falls       Objective     Pain:  Pain Assessment  Pain Assessment: 0-10  0-10 (Numeric) Pain Score: 6 (pt c/o sx site pain, increasing /c mobility, coughing; meds in place)    Cognition:  Cognition  Overall Cognitive Status: Within Functional Limits    General Assessments:  General Observation  General Observation: activity orders: ambulate every 4hrs while awake  lines: seferino drain, piv skin integrity: all exposed skin clean, dry, intact   Activity Tolerance  Endurance: Decreased tolerance for upright activites  Sensation  Sensation Comment: pt reports slight n/t LEs while in bed  Strength  Strength Comments: BLE at least 3/5     Coordination  Movements are Fluid and Coordinated: Yes     Dynamic Sitting Balance  Dynamic Sitting-Comments: G  Dynamic Standing Balance  Dynamic Standing-Comments: F+    Functional Assessments:     Bed Mobility  Bed Mobility: Yes  Bed Mobility 1  Bed Mobility Comments 1: Supine<>Sit /c modAx1. Mod VC/tactile cues for logroll; only fair return.  Transfers  Transfer: Yes  Transfer 1  Trials/Comments 1: Sit<>Stand /c modAx1, increased time.  Sit-stand x2 EOB, x1 from toilet /c use of grab bar.  Ambulation/Gait Training  Ambulation/Gait Training Performed:  (Pt ambulates 10'x2 /c WW, minAx1. Slow step through gait, decreased step height deana. Pt reports lightheadedness, but denies any worsening.)     Additional treatment including further pt education regarding precautions/logroll, activity recs. Additional gait/transfer training as pt up to BR to void. See mobility section for detail.      Outcome Measures:     [facility] Basic Mobility  Turning from your back to your side while in a flat bed without using bedrails: A lot  Moving from lying on your back to sitting on the side of a flat bed without using bedrails: A lot  Moving to and from bed to chair (including a wheelchair): A little  Standing up from a chair using your arms (e.g. wheelchair or bedside chair): A lot  To walk in hospital room: A little  Climbing 3-5 steps with railing: A lot  Basic Mobility - Total Score: 14          Goals:  Encounter Problems       Encounter Problems (Active)       PT Problem       STG - Pt will transition supine <> sitting with supervision/logroll (Progressing)       Start:  11/23/24    Expected End:  12/07/24            STG - Pt will transfer STS with supervision (Progressing)       Start:  11/23/24    Expected End:  12/07/24            STG - Pt will amb >/=100' using LRAD with supervision (Progressing)       Start:  11/23/24    Expected End:  12/07/24            STG - Pt will maintain G dynamic standing balance to decrease risk of falls  (Progressing)       Start:  11/23/24    Expected End:  12/07/24                 Education Documentation  Precautions, taught by Jessica Foster PT at 11/23/2024 11:10 AM.  Learner: Patient  Readiness: Acceptance  Method: Explanation  Response: Verbalizes Understanding, Needs Reinforcement    Mobility Training, taught by Jessica Foster PT at 11/23/2024 11:10 AM.  Learner: Patient  Readiness: Acceptance  Method: Explanation  Response:  Verbalizes Understanding, Needs Reinforcement    Education Comments  No comments found.

## 2024-11-23 NOTE — CARE PLAN
The patient's goals for the shift include  pain control.    The clinical goals for the shift include pain management.

## 2024-11-23 NOTE — CARE PLAN
Problem: Pain - Adult  Goal: Verbalizes/displays adequate comfort level or baseline comfort level  Outcome: Progressing     Problem: Chronic Conditions and Co-morbidities  Goal: Patient's chronic conditions and co-morbidity symptoms are monitored and maintained or improved  Outcome: Progressing     Problem: Skin  Goal: Decreased wound size/increased tissue granulation at next dressing change  Outcome: Progressing  Goal: Participates in plan/prevention/treatment measures  Outcome: Progressing  Goal: Prevent/manage excess moisture  Outcome: Progressing  Goal: Prevent/minimize sheer/friction injuries  Outcome: Progressing  Goal: Promote/optimize nutrition  Outcome: Progressing  Goal: Promote skin healing  Outcome: Progressing     Problem: Pain  Goal: Takes deep breaths with improved pain control throughout the shift  Outcome: Progressing  Goal: Turns in bed with improved pain control throughout the shift  Outcome: Progressing  Goal: Free from opioid side effects throughout the shift  Outcome: Progressing  Goal: Free from acute confusion related to pain meds throughout the shift  Outcome: Progressing     Problem: Fall/Injury  Goal: Be free from injury by end of the shift  Outcome: Progressing     Problem: Respiratory  Goal: Clear secretions with interventions this shift  Outcome: Progressing  Goal: No signs of respiratory distress (eg. Use of accessory muscles. Peds grunting)  Outcome: Progressing  Goal: Wean oxygen to maintain O2 saturation per order/standard this shift  Outcome: Progressing

## 2024-11-23 NOTE — PROGRESS NOTES
"Oneyda Parmar is a 69 y.o. female on day 3 of admission presenting with Paraesophageal hernia with gastroesophageal reflux.    Subjective   Patient seen this morning.  Upper GI yesterday without any leak.  Tolerated full liquids without issue.  Voiding without any problems.  Did have a cough overnight, for which she responded well to Robitussin.  Does complain of prashanth-incisional pain, mostly on the left side near her drain.     Objective     Physical Exam  Physical Exam:   Constitutional: Well developed, awake/alert/oriented x3, no distress, alert and cooperative  Eyes: PERRL, EOMI, clear sclera  Head/Neck: Neck supple, no apparent injury, No JVD, trachea midline  Respiratory/Thorax: good chest expansion, thorax symmetric  Cardiovascular: Regular, rate and rhythm,  2+ equal pulses of the extremities  Gastrointestinal: Nondistended, soft, minimally tender, no rebound tenderness or guarding, no masses palpable, incisions c/d/i, ISABEL drain with scant serosanguineous output  Musculoskeletal: ROM intact, no joint swelling, normal strength  Extremities: normal extremities, no cyanosis edema, contusions or wounds, no clubbing  Neurological: alert and oriented x3, intact senses,  Psychological: Appropriate mood and behavior  Skin: Warm and dry, no lesions, no rashes      Last Recorded Vitals  Blood pressure 138/72, pulse 80, temperature 36.9 °C (98.4 °F), resp. rate 18, height 1.651 m (5' 5\"), weight 74 kg (163 lb 2.3 oz), SpO2 95%.  Intake/Output last 3 Shifts:  I/O last 3 completed shifts:  In: - (0 mL/kg)   Out: 1320 (17.8 mL/kg) [Urine:1225 (0.5 mL/kg/hr); Drains:95]  Weight: 74 kg     Relevant Results               Assessment/Plan   Assessment & Plan  Paraesophageal hernia with gastroesophageal reflux    Renal insufficiency    Pneumothorax    Assessment: 69-year-old female with a prior history of a hiatal hernia repair with a Niesen fundoplication who presented with a recurrent acutely incarcerated hiatal hernia.  " Patient is now postop day 3 status post a laparoscopic hiatal hernia repair.     - Continue diet  - Drain removed  - Pain control as needed  - Antiemetic as needed  - Encourage IS/Ambulation  - Will consider discharge, likely tomorrow, if meeting additional criteria####           I spent 60 minutes in the professional and overall care of this patient.    Guanako Hernandes MD

## 2024-11-23 NOTE — CARE PLAN
Problem: Pain - Adult  Goal: Verbalizes/displays adequate comfort level or baseline comfort level  11/23/2024 1202 by Estelita Marin RN  Outcome: Progressing  11/23/2024 0743 by Estelita Marin RN  Outcome: Progressing     Problem: Chronic Conditions and Co-morbidities  Goal: Patient's chronic conditions and co-morbidity symptoms are monitored and maintained or improved  11/23/2024 1202 by Estelita Marin RN  Outcome: Progressing  11/23/2024 0743 by Estelita Marin RN  Outcome: Progressing     Problem: Skin  Goal: Decreased wound size/increased tissue granulation at next dressing change  11/23/2024 1202 by Estelita Marin RN  Outcome: Progressing  11/23/2024 0743 by Estelita Marin RN  Outcome: Progressing  Goal: Participates in plan/prevention/treatment measures  11/23/2024 1202 by Estelita Marin RN  Outcome: Progressing  11/23/2024 0743 by Estelita Marin RN  Outcome: Progressing  Goal: Prevent/manage excess moisture  11/23/2024 1202 by Estelita Marin RN  Outcome: Progressing  11/23/2024 0743 by Estelita Marin RN  Outcome: Progressing  Goal: Prevent/minimize sheer/friction injuries  11/23/2024 1202 by Estelita Marin RN  Outcome: Progressing  11/23/2024 0743 by Estelita Marin RN  Outcome: Progressing  Goal: Promote/optimize nutrition  11/23/2024 1202 by Estelita Marin RN  Outcome: Progressing  11/23/2024 0743 by Estelita Marin RN  Outcome: Progressing  Goal: Promote skin healing  11/23/2024 1202 by Estelita Marin RN  Outcome: Progressing  11/23/2024 0743 by Estelita Marin RN  Outcome: Progressing     Problem: Pain  Goal: Takes deep breaths with improved pain control throughout the shift  11/23/2024 1202 by Estelita Marin RN  Outcome: Progressing  11/23/2024 0743 by Estelita Marin RN  Outcome: Progressing  Goal: Turns in bed with improved pain control throughout the shift  11/23/2024 1202 by Estelita Marin RN  Outcome: Progressing  11/23/2024 0743 by Estelita Marin RN  Outcome:  Progressing  Goal: Free from opioid side effects throughout the shift  11/23/2024 1202 by Estelita Marin RN  Outcome: Progressing  11/23/2024 0743 by Estelita Marin RN  Outcome: Progressing  Goal: Free from acute confusion related to pain meds throughout the shift  11/23/2024 1202 by Estelita Marin RN  Outcome: Progressing  11/23/2024 0743 by Estelita Marin RN  Outcome: Progressing     Problem: Fall/Injury  Goal: Be free from injury by end of the shift  11/23/2024 1202 by Estelita Marin RN  Outcome: Progressing  11/23/2024 0743 by Estelita Marin RN  Outcome: Progressing     Problem: Respiratory  Goal: Clear secretions with interventions this shift  11/23/2024 1202 by Estelita Marin RN  Outcome: Progressing  11/23/2024 0743 by Estelita Marin RN  Outcome: Progressing  Goal: No signs of respiratory distress (eg. Use of accessory muscles. Peds grunting)  11/23/2024 1202 by Estelita Marin RN  Outcome: Progressing  11/23/2024 0743 by Estelita Marin RN  Outcome: Progressing  Goal: Wean oxygen to maintain O2 saturation per order/standard this shift  11/23/2024 1202 by Estelita Marin RN  Outcome: Progressing  11/23/2024 0743 by Estelita Marin RN  Outcome: Progressing

## 2024-11-24 VITALS
SYSTOLIC BLOOD PRESSURE: 131 MMHG | HEART RATE: 77 BPM | OXYGEN SATURATION: 95 % | HEIGHT: 65 IN | TEMPERATURE: 97.2 F | DIASTOLIC BLOOD PRESSURE: 77 MMHG | RESPIRATION RATE: 18 BRPM | BODY MASS INDEX: 27.18 KG/M2 | WEIGHT: 163.14 LBS

## 2024-11-24 PROBLEM — J93.9 PNEUMOTHORAX: Status: RESOLVED | Noted: 2024-11-20 | Resolved: 2024-11-24

## 2024-11-24 PROBLEM — N28.9 RENAL INSUFFICIENCY: Status: RESOLVED | Noted: 2024-11-20 | Resolved: 2024-11-24

## 2024-11-24 LAB
ALBUMIN SERPL BCP-MCNC: 3.5 G/DL (ref 3.4–5)
ALP SERPL-CCNC: 109 U/L (ref 33–136)
ALT SERPL W P-5'-P-CCNC: 20 U/L (ref 7–45)
ANION GAP SERPL CALC-SCNC: 17 MMOL/L (ref 10–20)
AST SERPL W P-5'-P-CCNC: 24 U/L (ref 9–39)
BASOPHILS # BLD AUTO: 0.03 X10*3/UL (ref 0–0.1)
BASOPHILS NFR BLD AUTO: 0.4 %
BILIRUB SERPL-MCNC: 0.6 MG/DL (ref 0–1.2)
BUN SERPL-MCNC: 11 MG/DL (ref 6–23)
CALCIUM SERPL-MCNC: 9.1 MG/DL (ref 8.6–10.3)
CHLORIDE SERPL-SCNC: 99 MMOL/L (ref 98–107)
CO2 SERPL-SCNC: 22 MMOL/L (ref 21–32)
CREAT SERPL-MCNC: 0.71 MG/DL (ref 0.5–1.05)
EGFRCR SERPLBLD CKD-EPI 2021: >90 ML/MIN/1.73M*2
EOSINOPHIL # BLD AUTO: 0.52 X10*3/UL (ref 0–0.7)
EOSINOPHIL NFR BLD AUTO: 6.7 %
ERYTHROCYTE [DISTWIDTH] IN BLOOD BY AUTOMATED COUNT: 12.8 % (ref 11.5–14.5)
GLUCOSE SERPL-MCNC: 88 MG/DL (ref 74–99)
HCT VFR BLD AUTO: 36.9 % (ref 36–46)
HGB BLD-MCNC: 12 G/DL (ref 12–16)
IMM GRANULOCYTES # BLD AUTO: 0.05 X10*3/UL (ref 0–0.7)
IMM GRANULOCYTES NFR BLD AUTO: 0.6 % (ref 0–0.9)
LYMPHOCYTES # BLD AUTO: 0.92 X10*3/UL (ref 1.2–4.8)
LYMPHOCYTES NFR BLD AUTO: 11.8 %
MCH RBC QN AUTO: 29.9 PG (ref 26–34)
MCHC RBC AUTO-ENTMCNC: 32.5 G/DL (ref 32–36)
MCV RBC AUTO: 92 FL (ref 80–100)
MONOCYTES # BLD AUTO: 0.42 X10*3/UL (ref 0.1–1)
MONOCYTES NFR BLD AUTO: 5.4 %
NEUTROPHILS # BLD AUTO: 5.86 X10*3/UL (ref 1.2–7.7)
NEUTROPHILS NFR BLD AUTO: 75.1 %
NRBC BLD-RTO: 0 /100 WBCS (ref 0–0)
PLATELET # BLD AUTO: 336 X10*3/UL (ref 150–450)
POTASSIUM SERPL-SCNC: 3.8 MMOL/L (ref 3.5–5.3)
PROT SERPL-MCNC: 6.6 G/DL (ref 6.4–8.2)
RBC # BLD AUTO: 4.01 X10*6/UL (ref 4–5.2)
SODIUM SERPL-SCNC: 134 MMOL/L (ref 136–145)
WBC # BLD AUTO: 7.8 X10*3/UL (ref 4.4–11.3)

## 2024-11-24 PROCEDURE — 2500000001 HC RX 250 WO HCPCS SELF ADMINISTERED DRUGS (ALT 637 FOR MEDICARE OP): Performed by: STUDENT IN AN ORGANIZED HEALTH CARE EDUCATION/TRAINING PROGRAM

## 2024-11-24 PROCEDURE — 97116 GAIT TRAINING THERAPY: CPT | Mod: CQ,GP

## 2024-11-24 PROCEDURE — 2500000004 HC RX 250 GENERAL PHARMACY W/ HCPCS (ALT 636 FOR OP/ED): Performed by: SURGERY

## 2024-11-24 PROCEDURE — 85025 COMPLETE CBC W/AUTO DIFF WBC: CPT | Performed by: SURGERY

## 2024-11-24 PROCEDURE — 36415 COLL VENOUS BLD VENIPUNCTURE: CPT | Performed by: SURGERY

## 2024-11-24 PROCEDURE — 2500000001 HC RX 250 WO HCPCS SELF ADMINISTERED DRUGS (ALT 637 FOR MEDICARE OP): Performed by: SURGERY

## 2024-11-24 PROCEDURE — 80053 COMPREHEN METABOLIC PANEL: CPT | Performed by: SURGERY

## 2024-11-24 PROCEDURE — 2500000004 HC RX 250 GENERAL PHARMACY W/ HCPCS (ALT 636 FOR OP/ED): Performed by: STUDENT IN AN ORGANIZED HEALTH CARE EDUCATION/TRAINING PROGRAM

## 2024-11-24 PROCEDURE — 97535 SELF CARE MNGMENT TRAINING: CPT | Mod: CQ,GP

## 2024-11-24 RX ORDER — METHYLPREDNISOLONE 4 MG/1
TABLET ORAL
Qty: 21 TABLET | Refills: 0 | Status: SHIPPED | OUTPATIENT
Start: 2024-11-24 | End: 2024-12-01

## 2024-11-24 RX ORDER — ACETAMINOPHEN 160 MG/5ML
650 SUSPENSION ORAL EVERY 4 HOURS PRN
Qty: 118 ML | Refills: 0 | Status: SHIPPED | OUTPATIENT
Start: 2024-11-24

## 2024-11-24 RX ORDER — HYDROXYZINE HYDROCHLORIDE 10 MG/1
10 TABLET, FILM COATED ORAL 3 TIMES DAILY
Status: DISCONTINUED | OUTPATIENT
Start: 2024-11-24 | End: 2024-11-24 | Stop reason: HOSPADM

## 2024-11-24 RX ORDER — HYDROXYZINE HYDROCHLORIDE 10 MG/1
10 TABLET, FILM COATED ORAL 3 TIMES DAILY
Qty: 30 TABLET | Refills: 0 | Status: SHIPPED | OUTPATIENT
Start: 2024-11-24

## 2024-11-24 RX ORDER — TRIAMCINOLONE ACETONIDE 1 MG/G
OINTMENT TOPICAL 2 TIMES DAILY
Status: DISCONTINUED | OUTPATIENT
Start: 2024-11-24 | End: 2024-11-24 | Stop reason: HOSPADM

## 2024-11-24 RX ORDER — TRIAMCINOLONE ACETONIDE 1 MG/G
OINTMENT TOPICAL 2 TIMES DAILY
Qty: 30 G | Refills: 0 | Status: SHIPPED | OUTPATIENT
Start: 2024-11-24

## 2024-11-24 RX ORDER — OXYCODONE HCL 5 MG/5 ML
5 SOLUTION, ORAL ORAL EVERY 6 HOURS PRN
Qty: 140 ML | Refills: 0 | Status: SHIPPED | OUTPATIENT
Start: 2024-11-24

## 2024-11-24 RX ORDER — ACETAMINOPHEN 10 MG/ML
1000 INJECTION, SOLUTION INTRAVENOUS EVERY 6 HOURS SCHEDULED
Status: DISCONTINUED | OUTPATIENT
Start: 2024-11-24 | End: 2024-11-24 | Stop reason: HOSPADM

## 2024-11-24 ASSESSMENT — PAIN - FUNCTIONAL ASSESSMENT: PAIN_FUNCTIONAL_ASSESSMENT: 0-10

## 2024-11-24 ASSESSMENT — COGNITIVE AND FUNCTIONAL STATUS - GENERAL
WALKING IN HOSPITAL ROOM: A LITTLE
MOVING FROM LYING ON BACK TO SITTING ON SIDE OF FLAT BED WITH BEDRAILS: A LITTLE
MOVING TO AND FROM BED TO CHAIR: A LITTLE
MOBILITY SCORE: 17
STANDING UP FROM CHAIR USING ARMS: A LITTLE
TURNING FROM BACK TO SIDE WHILE IN FLAT BAD: A LITTLE
CLIMB 3 TO 5 STEPS WITH RAILING: A LOT

## 2024-11-24 ASSESSMENT — PAIN SCALES - GENERAL
PAINLEVEL_OUTOF10: 10 - WORST POSSIBLE PAIN
PAINLEVEL_OUTOF10: 3
PAINLEVEL_OUTOF10: 0 - NO PAIN

## 2024-11-24 NOTE — PROGRESS NOTES
Physical Therapy    Physical Therapy Treatment    Patient Name: Oneyda Parmar  MRN: [id number]  Department: [facility]  Room: [address] [place] [address]  Today's Date: 11/24/2024  Time Calculation  Start Time: 1031  Stop Time: 1101  Time Calculation (min): 30 min         Assessment/Plan         PT Plan  Treatment/Interventions: Bed mobility, Transfer training, Gait training, Balance training, Neuromuscular re-education, Strengthening, Endurance training, Therapeutic exercise, Therapeutic activity  PT Plan: Ongoing PT  PT Frequency: 5 times per week  PT Discharge Recommendations: Moderate intensity level of continued care  PT - OK to Discharge: Yes      General Visit Information:   PT  Visit  PT Received On: 11/24/24       Subjective                  Objective   Pain: abdominal pain -no rating                              Treatments:       Bed Mobility  Bed Mobility:  (supine to sit and sit to supine sba)    Ambulation/Gait Training  Ambulation/Gait Training Performed:  (ambulated 20 feet and 50 feet using fixed wheeled walker sba)  Transfers  Transfer:  (sit to stand sba)    Issued fixed wheeled walker to patient for home       Outcome Measures:  [facility] Basic Mobility  Turning from your back to your side while in a flat bed without using bedrails: A little  Moving from lying on your back to sitting on the side of a flat bed without using bedrails: A little  Moving to and from bed to chair (including a wheelchair): A little  Standing up from a chair using your arms (e.g. wheelchair or bedside chair): A little  To walk in hospital room: A little  Climbing 3-5 steps with railing: A lot  Basic Mobility - Total Score: 17    Education Documentation  Mobility Training, taught by Jessica Sesay PTA at 11/24/2024  2:05 PM.  Learner: Patient  Readiness: Acceptance  Method: Demonstration  Response: Demonstrated Understanding    Education Comments  No comments found.               Encounter Problems       Encounter Problems (Active)       PT  Problem       STG - Pt will transition supine <> sitting with supervision/logroll (Progressing)       Start:  11/23/24    Expected End:  12/07/24            STG - Pt will transfer STS with supervision (Progressing)       Start:  11/23/24    Expected End:  12/07/24            STG - Pt will amb >/=100' using LRAD with supervision (Progressing)       Start:  11/23/24    Expected End:  12/07/24            STG - Pt will maintain G dynamic standing balance to decrease risk of falls  (Progressing)       Start:  11/23/24    Expected End:  12/07/24               Pain - Adult

## 2024-11-24 NOTE — CARE PLAN
The patient's goals for the shift include      The clinical goals for the shift include pain management      Problem: Pain - Adult  Goal: Verbalizes/displays adequate comfort level or baseline comfort level  Outcome: Progressing  Flowsheets (Taken 11/24/2024 0902)  Verbalizes/displays adequate comfort level or baseline comfort level:   Encourage patient to monitor pain and request assistance   Assess pain using appropriate pain scale     Problem: Chronic Conditions and Co-morbidities  Goal: Patient's chronic conditions and co-morbidity symptoms are monitored and maintained or improved  Outcome: Progressing  Flowsheets (Taken 11/24/2024 0902)  Care Plan - Patient's Chronic Conditions and Co-Morbidity Symptoms are Monitored and Maintained or Improved: Monitor and assess patient's chronic conditions and comorbid symptoms for stability, deterioration, or improvement     Problem: Skin  Goal: Decreased wound size/increased tissue granulation at next dressing change  Outcome: Progressing  Flowsheets (Taken 11/24/2024 0902)  Decreased wound size/increased tissue granulation at next dressing change: Promote sleep for wound healing  Goal: Participates in plan/prevention/treatment measures  Outcome: Progressing  Flowsheets (Taken 11/24/2024 0902)  Participates in plan/prevention/treatment measures: Discuss with provider PT/OT consult  Goal: Prevent/manage excess moisture  Outcome: Progressing  Flowsheets (Taken 11/24/2024 0902)  Prevent/manage excess moisture:   Moisturize dry skin   Monitor for/manage infection if present  Goal: Prevent/minimize sheer/friction injuries  Outcome: Progressing  Flowsheets (Taken 11/24/2024 0902)  Prevent/minimize sheer/friction injuries:   HOB 30 degrees or less   Increase activity/out of bed for meals  Goal: Promote/optimize nutrition  Outcome: Progressing  Flowsheets (Taken 11/24/2024 0902)  Promote/optimize nutrition:   Monitor/record intake including meals   Offer water/supplements/favorite  foods  Goal: Promote skin healing  Outcome: Progressing  Flowsheets (Taken 11/24/2024 0902)  Promote skin healing:   Protective dressings over bony prominences   Rotate device position/do not position patient on device     Problem: Pain  Goal: Takes deep breaths with improved pain control throughout the shift  Outcome: Progressing  Goal: Turns in bed with improved pain control throughout the shift  Outcome: Progressing  Goal: Free from opioid side effects throughout the shift  Outcome: Progressing  Goal: Free from acute confusion related to pain meds throughout the shift  Outcome: Progressing     Problem: Fall/Injury  Goal: Be free from injury by end of the shift  Outcome: Progressing     Problem: Respiratory  Goal: Clear secretions with interventions this shift  Outcome: Progressing  Flowsheets (Taken 11/24/2024 0902)  Clear secretions with interventions this shift: Incentive spirometry  Goal: No signs of respiratory distress (eg. Use of accessory muscles. Peds grunting)  Outcome: Progressing  Goal: Wean oxygen to maintain O2 saturation per order/standard this shift  Outcome: Progressing  Flowsheets (Taken 11/24/2024 0902)  Wean oxygen to maintain O2 saturation per order/standard this shift: Encourage activity/mobility

## 2024-11-24 NOTE — PROGRESS NOTES
"Oneyda Parmar is a 69 y.o. female on day 4 of admission presenting with Paraesophageal hernia with gastroesophageal reflux.    Subjective   Patient seen this morning.  Tolerating full liquids and passing flatus.  No electrolyte derangements.  Afebrile with stable hemodynamics.  He does note increased itching around her incisions and inframammary crease.     Objective     Physical Exam  Physical Exam:   Constitutional: Well developed, awake/alert/oriented x3, no distress, alert and cooperative  Eyes: PERRL, EOMI, clear sclera  Head/Neck: Neck supple, no apparent injury, No JVD, trachea midline  Respiratory/Thorax: good chest expansion, thorax symmetric  Cardiovascular: Regular, rate and rhythm,  2+ equal pulses of the extremities  Gastrointestinal: Nondistended, soft, minimally tender, no rebound tenderness or guarding, no masses palpable, incisions c/d/i, erythema and pruritus near incisions as well as inframammary crease  Musculoskeletal: ROM intact, no joint swelling, normal strength  Extremities: normal extremities, no cyanosis edema, contusions or wounds, no clubbing  Neurological: alert and oriented x3, intact senses,  Psychological: Appropriate mood and behavior  Skin: Warm and dry, no lesions, no rashes      Last Recorded Vitals  Blood pressure 131/77, pulse 77, temperature 36.2 °C (97.2 °F), temperature source Temporal, resp. rate 18, height 1.651 m (5' 5\"), weight 74 kg (163 lb 2.3 oz), SpO2 95%.  Intake/Output last 3 Shifts:  I/O last 3 completed shifts:  In: - (0 mL/kg)   Out: 1700 (23 mL/kg) [Urine:1700 (0.6 mL/kg/hr)]  Weight: 74 kg     Relevant Results               Assessment/Plan   Assessment & Plan  Paraesophageal hernia with gastroesophageal reflux    Assessment: 69-year-old female with a prior history of a hiatal hernia repair with a Niesen fundoplication who presented with a recurrent acutely incarcerated hiatal hernia.  Patient is now postop day 4 status post a laparoscopic hiatal hernia " repair.     - Continue diet  -Begin Atarax and Kenalog cream at incisions and areas of inflammation  - Will also give a dose of IV steroids while inpatient and discharged home with a Medrol Dosepak  - Pain control as needed  - Antiemetic as needed  - Encourage IS/Ambulation  - Will consider discharge, likely today, if meeting additional criteria####           I spent 60 minutes in the professional and overall care of this patient.    Guanako Hernandes MD

## 2024-11-24 NOTE — PROGRESS NOTES
Met with patient at bedside. Introduced self and role as care coordinator. Discussed discharge with patient and [facility] score. Discussed recs for SNF. Patient declining SNF and wanting to go home with [facility].       11/24 1250  Called placed to answering service to request HHC orders from covering provider. Did not receive return call. Secure chat sent to Greta to request referral be placed for [facility].

## 2024-11-24 NOTE — DISCHARGE INSTRUCTIONS
Medications:  - Medications should be crushed and mixed with full liquids. Capsules may be opened and mixed with full liquids.  - Take 650mg Tylenol every 6 hours for pain. Take between doses of your opioid pain medication. Try to limit the use of your opioid pain medication and only take as needed.  - If you have medications that you still cannot tolerate by your first visit with your surgeon or dietitian, please discuss this.   - You should be receiving or already have received the following medications for your postoperative course:  antinausea medication (ondansetron, metoclopramide), and pain medication (oxycodone, morphine, hydromorphone, hydrocodone). If you are missing any of these, please call the office immediately so we can prescribe them for you.    Constipation  - Take fiber (benefiber or metamucil) twice daily. Please also take colace (docusate) twice a day while taking oxycodone or other opiates. If you remain constipated (no bowel movement for 2 days) despite these medications, please take milk of magnesia and call the office to notify us.    Activity instructions:   - No lifting, pulling, or pushing objects greater than 15 pounds for 6weeks to 3 months for first time operation, if this redo case or revision then you have to wait 3-6 mouths with weight restriction   - Activity otherwise as tolerated.   - You may shower. Soap and water may run over your incisions. Pat dry. No submerging in baths or  swimming (activities that keep your incisions underwater) for at least two weeks.    - You may not drive while taking narcotics.     Diet:   - You will go home on a full liquid diet (similar to the diet you were on your final day in the hospital). Acceptable full liquids include protein shakes, sugar free jello, sugar free pudding, and creamy soups (no chunks).  You will stay on this full liquid diet on days 1-5 post discharge.    - You will then go to pureed foods (things you smear with a butter knife) on  days 6-10 post discharge.   - You will then go to soft diet (things you can mash with a fork) on days 11-15 post discharge.   - For further information, follow the diet instructions listed in your surgery instruction packet.     Call Provider If:  - Breathing faster or harder than normal.   - Fever of 100.4 F (38 C) or higher or feeling of chills.   - Feeling very sleepy and difficult to awaken.   - Inability to drink or significant decrease in ability to drink since discharge.   - Vomiting (throwing up) and not able to eat or drink for 12 hours.   - Urinating much less than your normal.  - More than 4 loose, watery bowel movements in 24 hours (diarrhea).   - Any new concerning symptoms.

## 2024-11-24 NOTE — DISCHARGE SUMMARY
Discharge Diagnosis  Paraesophageal hernia with gastroesophageal reflux    Issues Requiring Follow-Up  Post op nutrition and status    Test Results Pending At Discharge  Pending Labs       Order Current Status    Surgical Pathology Exam In process            Hospital Course   69-year-old female with a prior history of a hiatal hernia repair with a Nissen fundoplication who presented with a recurrent acutely incarcerated hiatal hernia.  She had underwent the reduction of his incarcerated hiatal hernia and cruroplasty as well as takedown of her Nissen fundoplication.  On hospital day 1, she was in the ICU and then downgraded.  She had an upper GI study on hospital day 2 which did not demonstrate any leak.  Tolerating a full liquid diet without issue.  She did develop a rash near her incisions and inframammary fold, for which she was given topical and IV steroids as well as Atarax.  Afebrile with stable hemodynamics.  Stable for discharge home.    Pertinent Physical Exam At Time of Discharge  Physical Exam  Physical Exam:   Constitutional: Well developed, awake/alert/oriented x3, no distress, alert and cooperative  Eyes: PERRL, EOMI, clear sclera  Head/Neck: Neck supple, no apparent injury, No JVD, trachea midline  Respiratory/Thorax: good chest expansion, thorax symmetric  Cardiovascular: Regular, rate and rhythm,  2+ equal pulses of the extremities  Gastrointestinal: Nondistended, soft, minimally tender, no rebound tenderness or guarding, no masses palpable, incisions c/d/I with erythema and inflammation at the incisions as well as inframammary crease  Musculoskeletal: ROM intact, no joint swelling, normal strength  Extremities: normal extremities, no cyanosis edema, contusions or wounds, no clubbing  Neurological: alert and oriented x3, intact senses,  Psychological: Appropriate mood and behavior  Skin: Warm and dry, no lesions, no rashes     Home Medications     Medication List      START taking these medications      acetaminophen 160 mg/5 mL (5 mL) suspension; Commonly known as: Tylenol;   Take 20.5 mL (650 mg) by mouth every 4 hours if needed for mild pain (1 -   3).   hydrOXYzine HCL 10 mg tablet; Commonly known as: Atarax; Take 1 tablet   (10 mg) by mouth 3 times a day.   methylPREDNISolone 4 mg tablets; Commonly known as: Medrol Dospak;   Follow schedule on package instructions   * oxyCODONE 5 mg/5 mL solution; Commonly known as: Roxicodone; Take 5 mL   (5 mg) by mouth every 6 hours if needed for severe pain (7 - 10).   * oxyCODONE 5 mg/5 mL solution; Commonly known as: Roxicodone; Take 5 mL   (5 mg) by mouth every 6 hours if needed for severe pain (7 - 10).   triamcinolone 0.1 % ointment; Commonly known as: Kenalog; Apply   topically 2 times a day.  * This list has 2 medication(s) that are the same as other medications   prescribed for you. Read the directions carefully, and ask your doctor or   other care provider to review them with you.     CHANGE how you take these medications     * ondansetron ODT 8 mg disintegrating tablet; Commonly known as:   Zofran-ODT; Take 1 tablet (8 mg) by mouth every 8 hours if needed for   nausea or vomiting for up to 10 days.; What changed: Another medication   with the same name was added. Make sure you understand how and when to   take each.   * ondansetron ODT 4 mg disintegrating tablet; Commonly known as:   Zofran-ODT; Dissolve 1 tablet (4 mg) by mouth every 8 hours if needed for   nausea or vomiting.; What changed: You were already taking a medication   with the same name, and this prescription was added. Make sure you   understand how and when to take each.  * This list has 2 medication(s) that are the same as other medications   prescribed for you. Read the directions carefully, and ask your doctor or   other care provider to review them with you.     CONTINUE taking these medications     albuterol 90 mcg/actuation inhaler   cholecalciferol (vitamin D3) 25 mcg (1,000 unit)  chewable gummy; Take 1   each (1,000 Units) by mouth once daily.   Gammagard Liquid infusion; Generic drug: immune globulin (human)   ibuprofen 600 mg tablet; Take 1 tablet (600 mg) by mouth every 8 hours   if needed for mild pain (1 - 3).   metoclopramide 10 mg tablet; Commonly known as: Reglan; Take 1 tablet   (10 mg) by mouth every 6 hours if needed (Nausea/Vomiting) for up to 14   days.   omeprazole 40 mg DR capsule; Commonly known as: PriLOSEC; Take 1 capsule   (40 mg) by mouth once daily. Do not crush or chew.   pramipexole 0.5 mg tablet; Commonly known as: Mirapex   propranolol LA 80 mg 24 hr capsule; Commonly known as: Inderal LA; Take   1 capsule (80 mg) by mouth once daily.   rizatriptan 10 mg tablet; Commonly known as: Maxalt; One po at onset of   migraine; can repeat in 2 hrs;  max 20mg daily     ASK your doctor about these medications     carbidopa-levodopa  mg tablet; Commonly known as: Sinemet; Take 1   tablet by mouth 3 times a day. Start with half a tablet three times daily   for one week then go to one full tablet after that.   tiotropium 18 mcg inhalation capsule; Commonly known as: Spiriva with   HandiHaler; Place 1 capsule (18 mcg) into inhaler and inhale once daily.       Outpatient Follow-Up  Future Appointments   Date Time Provider Department Center   11/25/2024  3:30 PM Leda Martinez RD [id number] [place]   11/26/2024 11:45 AM Husam Chaudhary MD [id number] [facility]   12/9/2024 12:00 PM Candido Will MD [id number] [facility]   1/7/2025  2:15 PM Husam Chaudhary MD [id number] [facility]   1/20/2025  3:30 PM Miguel Alvarado DO [id number] [place]   4/22/2025  8:40 AM Carly Metzger MD [id number] [place]   4/28/2025  9:00 AM Mary Duenas MD [id number] [place]       Guanako Hernandes MD

## 2024-11-25 ENCOUNTER — NUTRITION (OUTPATIENT)
Dept: SURGERY | Facility: CLINIC | Age: 69
End: 2024-11-25
Payer: MEDICARE

## 2024-11-25 ENCOUNTER — TELEPHONE (OUTPATIENT)
Dept: SURGERY | Facility: HOSPITAL | Age: 69
End: 2024-11-25
Payer: MEDICARE

## 2024-11-25 DIAGNOSIS — Z98.890 S/P HERNIA REPAIR: ICD-10-CM

## 2024-11-25 DIAGNOSIS — K21.9 PARAESOPHAGEAL HERNIA WITH GASTROESOPHAGEAL REFLUX: ICD-10-CM

## 2024-11-25 DIAGNOSIS — K44.9 PARAESOPHAGEAL HERNIA WITH GASTROESOPHAGEAL REFLUX: ICD-10-CM

## 2024-11-25 DIAGNOSIS — Z87.19 S/P HERNIA REPAIR: ICD-10-CM

## 2024-11-25 DIAGNOSIS — G89.18 POST-OP PAIN: ICD-10-CM

## 2024-11-25 LAB
ATRIAL RATE: 63 BPM
P AXIS: 36 DEGREES
P OFFSET: 198 MS
P ONSET: 140 MS
PR INTERVAL: 156 MS
Q ONSET: 218 MS
QRS COUNT: 10 BEATS
QRS DURATION: 84 MS
QT INTERVAL: 420 MS
QTC CALCULATION(BAZETT): 429 MS
QTC FREDERICIA: 427 MS
R AXIS: 17 DEGREES
T AXIS: 44 DEGREES
T OFFSET: 428 MS
VENTRICULAR RATE: 63 BPM

## 2024-11-25 RX ORDER — TRAMADOL HYDROCHLORIDE 50 MG/1
50 TABLET ORAL EVERY 8 HOURS PRN
Qty: 10 TABLET | Refills: 0 | Status: SHIPPED | OUTPATIENT
Start: 2024-11-25 | End: 2024-11-30

## 2024-11-25 NOTE — TELEPHONE ENCOUNTER
S/p hernia repair 11/20/24 and was discharged yesterday. Patient requesting virtual visit tomorrow. She states she is having pain and did not tolerate the half dose of oxycodone taken last night. She had an episode of vomiting and states she was told she was talking to herself in her sleep. Requesting alternate pain medication. Alternate medication pended and sent to provider. She states her pharmacy did not have the liquid Tylenol. Reviewed that she can use tablets, not coated, if needed that can be split. She states she did have a skin reaction to the glue over her incisions that was present prior to discharge and is taking her methylprednisolone as prescribed. Tomorrow's visit changed to virtual.

## 2024-11-25 NOTE — PROGRESS NOTES
Surgery Date:  11/20/24  Surgeon:   Neena  Procedure: HHR     ASSESSMENT:  Current weight pounds:   163.1 Ht:  65.0 in.        BMI:   27.1    PROGRESS:  Nutrition Interventions for last encounter (date):   1. Drink 64 oz of fluid daily  2. Drink enough protein shakes to meet your goal of 60-70 g of protein per day  3. Get up and walk frequently throughout the day.     CHANGES IN TREATMENT:   Patient met goals:    Partially   24 hour food recall:   pt on a full liquid diet  Beverages:    apple juice, water,   Alcohol:   none, does not drink       Physical Activity:     READINESS TO LEARN:  Motivation to learn:           Interested      Understanding of instruction: Good   Anticipated Compliance:   Good     Family Support: Pt's  present and supportive.      Patient presents with post-op weight loss surgery HHR.  The pt is 1 week post op.  Pt was not educated properly in the hospital on the HHR postop diet.  Patient tolerating a full liquid diet.  Protein intake is not adequate for post-op individual. Fluid consumption is adequate.  Pt was given cream of wheat in the hospital.  She tolerated it and had some at home.   Pt had first bowel movement today.  Reviewed the full liquid diet to follow for the next days.   Advised to get a protein shake and have 2 per day.   Reviewed protein shake options.  Reviewed the puree diet to start on 11/28. Emailed the HHR diet to her.  Answered all questions.       Malnutrition Screening  Significant unintentional weight loss? n/a  Eating less than 75% of usual intake for more than 2 weeks? n/a      Nutrition Diagnosis:   1. Food- and nutrition-related knowledge deficit related to lack of prior exposure to HHR postop diet  information as evidenced by diet recall.     Nutrition Interventions:   1. Modify type and amount of food and nutrients within meals and snacks.  2. Comprehensive Nutrition Education  -Nutrition education materials: Support Group Schedule        Recommendations:    1. Start drinking 2 protein shakes per day.  There examples of shakes to choose from in the diet that emailed to you.    2. Drink 64 oz. of decaffeinated and non-carbonated beverages daily.   3. Stay on a the full liquid until 11/28.   Strain out any lumps form anything you are sipping  on.    4. Continue to walk   5. Advance to the puree diet on for 1 week on 11/28.  Be sure to read over details in the Hiatal Hernia postop diet that  emailed to you.  Remember everything goes int he .      Nutrition Monitoring and Evaluation:   Criteria: weight check, food recall  Need for Follow-up:  1 week       Leda SHARP, [facility]  Bariatric Surgery Dietitian  Phone: [phone]  Fax: [phone]

## 2024-11-26 ENCOUNTER — OFFICE VISIT (OUTPATIENT)
Dept: SURGERY | Facility: HOSPITAL | Age: 69
End: 2024-11-26
Payer: MEDICARE

## 2024-11-26 DIAGNOSIS — R21 SKIN RASH: Primary | ICD-10-CM

## 2024-11-26 PROCEDURE — 1111F DSCHRG MED/CURRENT MED MERGE: CPT | Performed by: SURGERY

## 2024-11-26 PROCEDURE — 99211 OFF/OP EST MAY X REQ PHY/QHP: CPT | Performed by: SURGERY

## 2024-11-26 PROCEDURE — 1157F ADVNC CARE PLAN IN RCRD: CPT | Performed by: SURGERY

## 2024-12-02 ENCOUNTER — NUTRITION (OUTPATIENT)
Dept: SURGERY | Facility: CLINIC | Age: 69
End: 2024-12-02
Payer: MEDICARE

## 2024-12-02 VITALS — HEIGHT: 65 IN | BODY MASS INDEX: 23.99 KG/M2 | WEIGHT: 144 LBS

## 2024-12-02 NOTE — PROGRESS NOTES
Surgery Date:  11/20/24  Surgeon:   Neena  Procedure: HHR     ASSESSMENT:  Current weight pounds:    144.0                 Ht:  65.0     in.        BMI:  23.9  Previous weight pounds:     163.1   11/25/24     PROGRESS:  Nutrition Interventions for last encounter (date):   1.          Start drinking 2 protein shakes per day.  There examples of shakes to choose from in the diet that emailed to you.    2.          Drink 64 oz. of decaffeinated and non-carbonated beverages daily.   3.          Stay on a the full liquid until 11/28.   Strain out any lumps form anything you are sipping  on.    4.          Continue to walk   5.          Advance to the puree diet on for 1 week on 11/28.  Be sure to read over details in the Hiatal Hernia  postop diet that  emailed to you.  Remember everything goes int he .      CHANGES IN TREATMENT:   Patient met goals: Yes    No    Partially   24 hour food recall:  Pt on a full liquids and pureed;    Fluids: apple juice, OJ,  water, strawberry banana juice, tea, protein shakes   Pureed foods:  cottage cheese, Greek yogurt, apple sauce, pudding, mashed potatoes, cream of wheat  Beverages:    Alcohol:   none    Physical Activity:   walking around the house     READINESS TO LEARN:  Motivation to learn:           Interested      Understanding of instruction: Good     Anticipated Compliance: Good       Family Support: Unable to assess-family not present     Patient presents with post-op HHR. The pt is 2 weeks postop.   Tolerating a pureed  diet without difficulty.  Protein intake is not adequate for post-op individual. Fluid consumption is adequate.     Pt c/o diarrhea and feeling fatigued.  She is going 3 or more times per day that just started today.    Suggested not drinking anymore juice. Recommend that she drink Gatorade or Powerade and other fluids like Crystal Light, water, and decaf tea.   Advised to call her PCP if diarrhea persists or get worse.    Pt c/o not tolerating protein  shakes.  She doesn't like them Suggested trying another brand/flavor.  She does not seem willing to try any other shakes at this time.   Pt will be advancing to the soft diet on 11/4.  Reviewed it with her. Reinforced the need to chew her food very well and eat slowly.       Malnutrition Screening  Significant unintentional weight loss? n/a  Eating less than 75% of usual intake for more than 2 weeks? n/a    Nutrition Diagnosis:   1. Food- and nutrition-related knowledge deficit related to lack of prior exposure to HHR postop diet  information as evidenced by diet recall.   2. Food- and nutrition-related knowledge deficit related to lack of prior exposure to surgical weight loss information as evidenced by diet recall.     Nutrition Interventions:   1. Modify type and amount of food and nutrients within meals and snacks.  2. Comprehensive Nutrition Education  -Nutrition education materials: SG schedule      Recommendations:    1. Drink  60-70 g of protein per day. Try another protein shake.  Eat more blended cottage cheese and smooth greek yogurt.   2. Drink 64 oz. of zero calorie beverages per day. Monitor your fluids to make sure you are drinking enough.  Stop drinking juice. Drink Crystal Light or the generic or herbal tea, water.  Start drinking Gatorade or Powerade.    3. Continue to walk.   5. Advance to the soft diet on 12/4.  On 12/11 you can start the regular diet phase and slowly start adding regular textured foods in 1 at a time, remembering to chew well and eat slowly.   6. Eat slowly, chew thoroughly  7. Try one new food at a time.   8.          Call you doctor if your diarrhea gets persists or gets worse.      Nutrition Monitoring and Evaluation:   Criteria: weight check, food recall  Need for Follow-up:   GUANACO SHARP, [facility]  Bariatric Surgery Dietitian  Phone: [phone]  Fax: [phone]

## 2024-12-09 ENCOUNTER — APPOINTMENT (OUTPATIENT)
Dept: NEUROLOGY | Facility: HOSPITAL | Age: 69
End: 2024-12-09
Payer: MEDICARE

## 2024-12-18 ENCOUNTER — APPOINTMENT (OUTPATIENT)
Dept: SURGERY | Facility: CLINIC | Age: 69
End: 2024-12-18
Payer: MEDICARE

## 2024-12-19 ENCOUNTER — APPOINTMENT (OUTPATIENT)
Dept: SURGERY | Facility: CLINIC | Age: 69
End: 2024-12-19
Payer: MEDICARE

## 2024-12-27 NOTE — PROGRESS NOTES
GENERAL SURGERY CLINIC  FOLLOW UP NOTE      Name: Oneyda Parmar  MRN: [id number]      Index Surgery  Date of Surgery: 11/20/24   Surgeon: Dr. Chaudhary                   Surgical Procedure: Other: REPAIR OF INCARCERATED  PARAESOPHAGEAL HERNIA, DIAGNOSTIC LAPAROSCOPY, EGD/ TAPBLOCK      HPI:   Presenting for follow up visit.    Concerns related to:  Nausea/Vomiting, Reflux: no  Abdominal Pain: no  Diarrhea/Constipation no    REVIEW OF SYSTEMS:  Review of symptoms negative for any new symptoms.    PHYSICAL EXAM:  There were no vitals taken for this visit.      ASSESSMENT & PLAN:  69 y.o. female presenting for follow up 8 weeks after incarcerated PEH repair with undoing the previous wrap, patient had allergic reaction to glue on the incisions which responded to Benadryl and she has gradually increased her oral intake to solid foods and has tolerating well.  She does not have any regurgitation, reflux, dysphagia, chest pain, shortness of breath, fever, chills, nausea, vomiting issues anymore.  She has been on PPI and wants a refill.  Plan:    Okay to refill the PPI, follow-up with PCP in future for further refills.  Most likely she will need to stay on PPI for a long time.    Follow-up with surgery team as needed.

## 2025-01-07 ENCOUNTER — OFFICE VISIT (OUTPATIENT)
Dept: SURGERY | Facility: HOSPITAL | Age: 70
End: 2025-01-07
Payer: MEDICARE

## 2025-01-07 DIAGNOSIS — K44.9 PARAESOPHAGEAL HERNIA WITH GASTROESOPHAGEAL REFLUX: Primary | ICD-10-CM

## 2025-01-07 DIAGNOSIS — K21.9 PARAESOPHAGEAL HERNIA WITH GASTROESOPHAGEAL REFLUX: Primary | ICD-10-CM

## 2025-01-07 DIAGNOSIS — K21.9 GASTROESOPHAGEAL REFLUX DISEASE WITHOUT ESOPHAGITIS: ICD-10-CM

## 2025-01-07 PROCEDURE — 99211 OFF/OP EST MAY X REQ PHY/QHP: CPT | Performed by: SURGERY

## 2025-01-07 PROCEDURE — 1157F ADVNC CARE PLAN IN RCRD: CPT | Performed by: SURGERY

## 2025-01-07 RX ORDER — OMEPRAZOLE 40 MG/1
40 CAPSULE, DELAYED RELEASE ORAL DAILY
Qty: 30 CAPSULE | Refills: 2 | Status: SHIPPED | OUTPATIENT
Start: 2025-01-07 | End: 2025-04-07

## 2025-01-09 ENCOUNTER — APPOINTMENT (OUTPATIENT)
Dept: SURGERY | Facility: CLINIC | Age: 70
End: 2025-01-09
Payer: MEDICARE

## 2025-01-15 ENCOUNTER — APPOINTMENT (OUTPATIENT)
Dept: SURGERY | Facility: CLINIC | Age: 70
End: 2025-01-15
Payer: MEDICARE

## 2025-01-20 ENCOUNTER — APPOINTMENT (OUTPATIENT)
Dept: PRIMARY CARE | Facility: CLINIC | Age: 70
End: 2025-01-20
Payer: COMMERCIAL

## 2025-01-20 VITALS
HEIGHT: 65 IN | BODY MASS INDEX: 25.83 KG/M2 | DIASTOLIC BLOOD PRESSURE: 70 MMHG | SYSTOLIC BLOOD PRESSURE: 116 MMHG | WEIGHT: 155 LBS

## 2025-01-20 DIAGNOSIS — G43.009 MIGRAINE WITHOUT AURA AND WITHOUT STATUS MIGRAINOSUS, NOT INTRACTABLE: ICD-10-CM

## 2025-01-20 DIAGNOSIS — M34.9 SCLERODERMA (MULTI): ICD-10-CM

## 2025-01-20 DIAGNOSIS — J47.9 BRONCHIECTASIS WITHOUT ACUTE EXACERBATION (MULTI): ICD-10-CM

## 2025-01-20 DIAGNOSIS — G20.B2 PARKINSON'S DISEASE WITH DYSKINESIA AND FLUCTUATING MANIFESTATIONS: ICD-10-CM

## 2025-01-20 DIAGNOSIS — D83.9 CVI (COMMON VARIABLE IMMUNODEFICIENCY): ICD-10-CM

## 2025-01-20 DIAGNOSIS — M35.1 MCTD (MIXED CONNECTIVE TISSUE DISEASE) (MULTI): ICD-10-CM

## 2025-01-20 DIAGNOSIS — K21.9 GASTROESOPHAGEAL REFLUX DISEASE WITHOUT ESOPHAGITIS: ICD-10-CM

## 2025-01-20 DIAGNOSIS — F33.1 MAJOR DEPRESSIVE DISORDER, RECURRENT, MODERATE: ICD-10-CM

## 2025-01-20 PROCEDURE — G2211 COMPLEX E/M VISIT ADD ON: HCPCS | Performed by: FAMILY MEDICINE

## 2025-01-20 PROCEDURE — 3008F BODY MASS INDEX DOCD: CPT | Performed by: FAMILY MEDICINE

## 2025-01-20 PROCEDURE — 1036F TOBACCO NON-USER: CPT | Performed by: FAMILY MEDICINE

## 2025-01-20 PROCEDURE — 1157F ADVNC CARE PLAN IN RCRD: CPT | Performed by: FAMILY MEDICINE

## 2025-01-20 PROCEDURE — 1159F MED LIST DOCD IN RCRD: CPT | Performed by: FAMILY MEDICINE

## 2025-01-20 PROCEDURE — 1160F RVW MEDS BY RX/DR IN RCRD: CPT | Performed by: FAMILY MEDICINE

## 2025-01-20 PROCEDURE — 99214 OFFICE O/P EST MOD 30 MIN: CPT | Performed by: FAMILY MEDICINE

## 2025-01-20 RX ORDER — PROPRANOLOL HYDROCHLORIDE 80 MG/1
80 CAPSULE, EXTENDED RELEASE ORAL DAILY
Qty: 90 CAPSULE | Refills: 2 | Status: SHIPPED | OUTPATIENT
Start: 2025-01-20 | End: 2025-01-20 | Stop reason: ALTCHOICE

## 2025-01-20 RX ORDER — RIZATRIPTAN BENZOATE 10 MG/1
TABLET ORAL
Qty: 27 TABLET | Refills: 2 | Status: SHIPPED | OUTPATIENT
Start: 2025-01-20

## 2025-01-20 RX ORDER — IBUPROFEN 600 MG/1
600 TABLET ORAL EVERY 8 HOURS PRN
Qty: 90 TABLET | Refills: 5 | Status: SHIPPED | OUTPATIENT
Start: 2025-01-20

## 2025-01-20 RX ORDER — OMEPRAZOLE 40 MG/1
40 CAPSULE, DELAYED RELEASE ORAL DAILY
Qty: 90 CAPSULE | Refills: 2 | Status: SHIPPED | OUTPATIENT
Start: 2025-01-20 | End: 2025-10-17

## 2025-01-20 NOTE — PROGRESS NOTES
General Medical Management Note    69 y.o. female presents for Medical Management  HPI  Paraesophageal herniorrhaphy 11/2024.   incarcerated PEH repair with undoing the previous initial PEH wrap.      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  Taking Sinemet TID    Migraines - not very frequent recently.  Inderal LA use for prophylaxis.         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)     Anti-pneumococcal polysaccharide antibody deficiency    COVID-19 09/21/2022    COVID-19 virus infection    COVID-19 virus infection 06/21/2023    Dysphagia, unspecified     Dysphagia, idiopathic    Encounter for screening for malignant neoplasm of cervix     Pap smear for cervical cancer screening    Localized edema 05/04/2022    Bilateral edema of lower extremity    Parkinson's disease (Multi) 06/21/2022    Parkinsonism    Personal history of other diseases of the digestive system 07/18/2018    History of hiatal hernia    Personal history of other diseases of the musculoskeletal system and connective tissue     History of osteoarthritis    Personal history of other diseases of the respiratory system 10/08/2017    History of asthma    Personal history of other endocrine, nutritional and metabolic disease 08/08/2016    History of hyperlipidemia    Personal history of other endocrine, nutritional and metabolic disease 10/08/2017    History of hypothyroidism    Personal history of other malignant neoplasm of skin     History of malignant neoplasm of skin    Personal history of other medical treatment     History of screening mammography    Personal history of pneumonia (recurrent) 03/18/2020    History of pneumonia due to Pseudomonas    Personal history of pneumonia (recurrent) 01/29/2021    History of pneumonia due to  Pseudomonas    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    Raynaud's syndrome without gangrene     Raynaud disease      Past Surgical History:   Procedure Laterality Date    BREAST LUMPECTOMY  06/23/2017    Right Breast Lumpectomy    COLONOSCOPY  07/29/2015    Complete Colonoscopy    GASTRIC FUNDOPLICATION  07/19/2018    Esophagogastric Fundoplasty Nissen Fundoplication    OTHER SURGICAL HISTORY  02/19/2015    Treatment Of Jaw    OTHER SURGICAL HISTORY  06/06/2019    Nissen fundoplication laparoscopic    OTHER SURGICAL HISTORY  06/23/2017    Biopsy Pleural    OTHER SURGICAL HISTORY  06/23/2017    Shoulder Repair    SINUS SURGERY  06/23/2017    Sinus Surgery    TONSILLECTOMY  06/23/2017    Tonsillectomy     Family History   Problem Relation Name Age of Onset    Other (cerebrovascular accident) Mother      COPD Father      Prostate cancer Brother        Social History     Socioeconomic History    Marital status:      Spouse name: Not on file    Number of children: Not on file    Years of education: Not on file    Highest education level: Not on file   Occupational History    Not on file   Tobacco Use    Smoking status: Never     Passive exposure: Never    Smokeless tobacco: Never   Vaping Use    Vaping status: Never Used   Substance and Sexual Activity    Alcohol use: Not Currently    Drug use: Never    Sexual activity: Not on file   Other Topics Concern    Not on file   Social History Narrative    Not on file     Social Drivers of Health     Financial Resource Strain: Patient Unable To Answer (11/20/2024)    Overall Financial Resource Strain (CARDIA)     Difficulty of Paying Living Expenses: Patient unable to answer   Food Insecurity: Patient Unable To Answer (11/20/2024)    Hunger Vital Sign     Worried About Running Out of Food in the Last Year: Patient unable to answer     Ran Out of Food in the Last Year: Patient unable to answer   Transportation Needs:  Patient Unable To Answer (11/20/2024)    PRAPARE - Transportation     Lack of Transportation (Medical): Patient unable to answer     Lack of Transportation (Non-Medical): Patient unable to answer   Physical Activity: Not on file   Stress: Not on file   Social Connections: Not on file   Intimate Partner Violence: Patient Unable To Answer (11/20/2024)    Humiliation, Afraid, Rape, and Kick questionnaire     Fear of Current or Ex-Partner: Patient unable to answer     Emotionally Abused: Patient unable to answer     Physically Abused: Patient unable to answer     Sexually Abused: Patient unable to answer   Housing Stability: Patient Unable To Answer (11/20/2024)    Housing Stability Vital Sign     Unable to Pay for Housing in the Last Year: Patient unable to answer     Number of Times Moved in the Last Year: 1     Homeless in the Last Year: Patient unable to answer       Current Outpatient Medications on File Prior to Visit   Medication Sig Dispense Refill    acetaminophen (Tylenol) 160 mg/5 mL (5 mL) suspension Take 20.5 mL (650 mg) by mouth every 4 hours if needed for mild pain (1 - 3). 118 mL 0    albuterol 90 mcg/actuation inhaler Inhale 2 puffs every 4 hours if needed.      cholecalciferol, vitamin D3, 25 mcg (1,000 unit) chewable gummy Take 1 each (1,000 Units) by mouth once daily. 90 each 1    hydrOXYzine HCL (Atarax) 10 mg tablet Take 1 tablet (10 mg) by mouth 3 times a day. 30 tablet 0    ibuprofen 600 mg tablet Take 1 tablet (600 mg) by mouth every 8 hours if needed for mild pain (1 - 3). 90 tablet 5    immune globulin, human, (Gammagard Liquid) infusion Infuse monthly as directed      omeprazole (PriLOSEC) 40 mg DR capsule Take 1 capsule (40 mg) by mouth once daily. Do not crush or chew. 30 capsule 2    propranolol LA (Inderal LA) 80 mg 24 hr capsule Take 1 capsule (80 mg) by mouth once daily. 90 capsule 2    rizatriptan (Maxalt) 10 mg tablet One po at onset of migraine; can repeat in 2 hrs;  max 20mg daily  "27 tablet 2    tiotropium (Spiriva with HandiHaler) 18 mcg inhalation capsule Place 1 capsule (18 mcg) into inhaler and inhale once daily. 30 capsule 11    triamcinolone (Kenalog) 0.1 % ointment Apply topically 2 times a day. 30 g 0    carbidopa-levodopa (Sinemet)  mg tablet Take 1 tablet by mouth 3 times a day. Start with half a tablet three times daily for one week then go to one full tablet after that. (Patient not taking: Reported on 11/14/2024) 45 tablet 2    metoclopramide (Reglan) 10 mg tablet Take 1 tablet (10 mg) by mouth every 6 hours if needed (Nausea/Vomiting) for up to 14 days. 56 tablet 0    ondansetron ODT (Zofran-ODT) 4 mg disintegrating tablet Dissolve 1 tablet (4 mg) by mouth every 8 hours if needed for nausea or vomiting. 30 tablet 0    oxyCODONE (Roxicodone) 5 mg/5 mL solution Take 5 mL (5 mg) by mouth every 6 hours if needed for severe pain (7 - 10). 140 mL 0    oxyCODONE (Roxicodone) 5 mg/5 mL solution Take 5 mL (5 mg) by mouth every 6 hours if needed for severe pain (7 - 10). 140 mL 0    pramipexole (Mirapex) 0.5 mg tablet Take 1 tablet (0.5 mg) by mouth 3 times a day.       No current facility-administered medications on file prior to visit.       Allergies   Allergen Reactions    Doxycycline Nausea/vomiting    Prednisone Other     Red and hot when taking prednisone    Topiramate Palpitations and Nausea/vomiting         ROS: Denies chest pain, SOB, Headache, GI problems     Visit Vitals  /70   Ht 1.651 m (5' 5\")   Wt 70.3 kg (155 lb)   BMI 25.79 kg/m²   OB Status Postmenopausal   Smoking Status Never   BSA 1.8 m²      Vitals:    01/20/25 1525   BP: 116/70   Weight: 70.3 kg (155 lb)   Height: 1.651 m (5' 5\")       PHYSICAL EXAM:  Alert and oriented x3.  Eyes: EOM grossly intact  Neck supple without lymph adenopathy or carotid bruit.  No masses or thyromegaly  Heart regular rate and rhythm without murmur.  Lungs clear to auscultation.  Legs without edema.  Gait is " non-antalgic  Speech clear.  Hearing adequate.          DIAGNOSIS/PLAN:    1. MCTD (mixed connective tissue disease) (Multi)  - ibuprofen 600 mg tablet; Take 1 tablet (600 mg) by mouth every 8 hours if needed for mild pain (1 - 3).  Dispense: 90 tablet; Refill: 5    2. Migraine without aura and without status migrainosus, not intractable  - ibuprofen 600 mg tablet; Take 1 tablet (600 mg) by mouth every 8 hours if needed for mild pain (1 - 3).  Dispense: 90 tablet; Refill: 5  - rizatriptan (Maxalt) 10 mg tablet; One po at onset of migraine; can repeat in 2 hrs;  max 20mg daily  Dispense: 27 tablet; Refill: 2  -Inderal LA - patient will decrease Inderal LA to 1 pill every other day in an attempt to wean off her prophylactic medication    3. Gastroesophageal reflux disease without esophagitis  - omeprazole (PriLOSEC) 40 mg DR capsule; Take 1 capsule (40 mg) by mouth once daily. Do not crush or chew.  Dispense: 90 capsule; Refill: 2    4. CVI (common variable immunodeficiency)    5. Scleroderma (Multi)    6. Parkinson's disease with dyskinesia and fluctuating manifestations    7. Bronchiectasis without acute exacerbation (Multi)    8. Major depressive disorder, recurrent, moderate      Return to office in 6 months for comprehensive medical evaluation, long-term medication use monitoring, and preventative services screening    We will continue to monitor, evaluate, assess and treat all problems/diagnoses as appropriate and continue to collaborate with specialists.    Encouraged to sign up with [facility]    Contact office or send a  [employer] message with any questions or concerns    Patient will only be notified of labs that require medical intervention.    Prescriptions will not be filled unless you are compliant with your follow up appointments or have a follow up appointment scheduled as per instruction of your physician. Refills should be requested at the time of your visit.    **Charting was completed using voice  recognition technology and may include unintended errors**    Miguel Alvarado DO, JOSE  [id number] [id number]  [place] [address] [phone]  Miguel Alvarado DO, JOSE

## 2025-02-17 ENCOUNTER — TELEPHONE (OUTPATIENT)
Dept: SURGERY | Facility: CLINIC | Age: 70
End: 2025-02-17
Payer: MEDICARE

## 2025-02-17 ENCOUNTER — OFFICE VISIT (OUTPATIENT)
Dept: NEUROLOGY | Facility: HOSPITAL | Age: 70
End: 2025-02-17
Payer: MEDICARE

## 2025-02-17 VITALS
RESPIRATION RATE: 18 BRPM | TEMPERATURE: 97.1 F | HEIGHT: 65 IN | WEIGHT: 163 LBS | HEART RATE: 60 BPM | BODY MASS INDEX: 27.16 KG/M2 | SYSTOLIC BLOOD PRESSURE: 139 MMHG | DIASTOLIC BLOOD PRESSURE: 74 MMHG

## 2025-02-17 DIAGNOSIS — K21.9 GASTROESOPHAGEAL REFLUX DISEASE WITHOUT ESOPHAGITIS: ICD-10-CM

## 2025-02-17 DIAGNOSIS — G20.A1 PARKINSON'S DISEASE WITHOUT DYSKINESIA OR FLUCTUATING MANIFESTATIONS: ICD-10-CM

## 2025-02-17 DIAGNOSIS — K21.9 PARAESOPHAGEAL HERNIA WITH GASTROESOPHAGEAL REFLUX: ICD-10-CM

## 2025-02-17 DIAGNOSIS — K44.9 HIATAL HERNIA: ICD-10-CM

## 2025-02-17 DIAGNOSIS — R11.10 REGURGITATION OF FOOD: ICD-10-CM

## 2025-02-17 DIAGNOSIS — K44.9 PARAESOPHAGEAL HERNIA WITH GASTROESOPHAGEAL REFLUX: ICD-10-CM

## 2025-02-17 DIAGNOSIS — R14.2 BELCHING: ICD-10-CM

## 2025-02-17 PROBLEM — Z87.19 HISTORY OF REPAIR OF HIATAL HERNIA: Status: ACTIVE | Noted: 2025-02-17

## 2025-02-17 PROBLEM — Z98.890 HISTORY OF REPAIR OF HIATAL HERNIA: Status: ACTIVE | Noted: 2025-02-17

## 2025-02-17 PROCEDURE — 1157F ADVNC CARE PLAN IN RCRD: CPT | Performed by: PSYCHIATRY & NEUROLOGY

## 2025-02-17 PROCEDURE — 1126F AMNT PAIN NOTED NONE PRSNT: CPT | Performed by: PSYCHIATRY & NEUROLOGY

## 2025-02-17 PROCEDURE — 3008F BODY MASS INDEX DOCD: CPT | Performed by: PSYCHIATRY & NEUROLOGY

## 2025-02-17 PROCEDURE — 99213 OFFICE O/P EST LOW 20 MIN: CPT | Performed by: PSYCHIATRY & NEUROLOGY

## 2025-02-17 PROCEDURE — 1159F MED LIST DOCD IN RCRD: CPT | Performed by: PSYCHIATRY & NEUROLOGY

## 2025-02-17 PROCEDURE — 1036F TOBACCO NON-USER: CPT | Performed by: PSYCHIATRY & NEUROLOGY

## 2025-02-17 RX ORDER — CARBIDOPA AND LEVODOPA 25; 100 MG/1; MG/1
1 TABLET ORAL 3 TIMES DAILY
Qty: 90 TABLET | Refills: 6 | Status: SHIPPED | OUTPATIENT
Start: 2025-02-17 | End: 2026-02-17

## 2025-02-17 ASSESSMENT — PAIN SCALES - GENERAL: PAINLEVEL_OUTOF10: 0-NO PAIN

## 2025-02-17 NOTE — PROGRESS NOTES
"GENERAL SURGERY CLINIC  FOLLOW UP NOTE      Name: Oneyda Parmar  MRN: [id number]      Index Surgery  Date of Surgery: 11/20/24   Surgeon: Dr. Chaudhary                   Surgical Procedure: Other: REPAIR OF INCARCERATED  PARAESOPHAGEAL HERNIA, DIAGNOSTIC LAPAROSCOPY, EGD/ TAPBLOCK      HPI: 69 year old female presenting today to review upper GI. Patient contacted the office earlier in the week with complaints of burping, coughing, regurgitation of food, mild SOB, and abdominal tenderness.  Patient is s/p repair of incarcerated paraesophageal hernia on 11/20/24.     Concerns related to:  Nausea/Vomiting, Reflux: Yes  Abdominal Pain: Tenderness Left Upper Quad.  Diarrhea/Constipation: constipation    REVIEW OF SYSTEMS:  Review of symptoms negative for any new symptoms.    PHYSICAL EXAM:  /69 (BP Location: Left arm, Patient Position: Sitting, BP Cuff Size: Adult)   Pulse 64   Ht 1.651 m (5' 5\")   Wt 72.6 kg (160 lb)   SpO2 98%   BMI 26.63 kg/m²       ASSESSMENT & PLAN:  69 year old female presenting today to review upper GI. Patient contacted the office earlier in the week with complaints of burping, coughing, regurgitation of food, mild SOB, and abdominal tenderness.  Patient is s/p repair of incarcerated paraesophageal hernia on 11/20/25.     Overall patient is doing very well, she is able to tolerate and keep her food down, she is eating much more than before and has gained almost 15-20 pounds.  Occasionally she feels food backing up however that is also improving and she does not have subjective reflux although she is on PPI.  She underwent an upper GI study which is fairly unremarkable, there is no recurrent hernia or reflux.    Overall I feel like she is doing okay and there is no need for any surgical intervention, I guided her on eating slowly, chewing very well, eating more frequently but avoiding larger meals.  She needs to stay on PPI.  Encouraged her to talk to PCP about potentially putting her on " appetite suppressants to help with appetite issue, she was on Ozempic at some point however she does not want to go back on it because of the cost associated although I think she does not need it currently.    I offered her opportunity to ask any questions and she was satisfied.  If her symptoms persist then we may need to do EGD, manometry and pH study again however I think she is not that far from his significant operation recently so I would rather wait for another 3 months or so before any further workup is done.  I advised her to follow-up with us in 3 months and as needed before before.

## 2025-02-17 NOTE — TELEPHONE ENCOUNTER
Outbound call to patient in response to [employer] message.  Per Dr. Chaudhary, patient needs to get an Upper GI study and follow up in clinic for an examination of the tender area she is describing.  Patient did not answer, a detailed voicemail was left and will follow up with the same instructions via [employer].

## 2025-02-17 NOTE — PROGRESS NOTES
Diagnoses/Problems     · Back pain, chronic (724.5,338.29) (M54.9,G89.29)   · Parkinson's disease (332.0) (G20)     Chief Complaint  PD.      History of Present Illness  Ms. 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     Interval hx:  She was having severe vomiting due to her hiatal hernia so was never able to take the sinemet I prescribed the last time. She even stopped mirapex because she was keeping nothing down (her online gambling improved after that). She was eventually found to have incarcerate paraesophageal hernia and had to have emergency surgery. She improved after that although some of the burping is no coming back. She just started sinemet two weeks ago and is already feeling a good response from it. Her PD symptoms have been mild and tolerable. No side effects from sinemet.           Review of Systems  All systems reviewed and are negative except as mentioned in the HPI.        Active Problems  Problems    · Abdominal bloating (787.3) (R14.0)   · Acute bacterial bronchitis (466.0,041.9) (J20.8,B96.89)   · Arthritis (716.90) (M19.90)   · Atrophic vaginitis (627.3) (N95.2)   · Back pain, chronic (724.5,338.29) (M54.9,G89.29)   · Borderline hyperlipidemia (272.4) (E78.5)   · Breast cancer screening by mammogram (V76.12) (Z12.31)   · Bronchiectasis (494.0) (J47.9)   · Chronic constipation (564.00) (K59.09)   · Chronic cough (786.2) (R05.3)   · Chronic GERD (530.81) (K21.9)   · Chronic low back pain (724.2,338.29) (M54.50,G89.29)   · Chronic sinusitis (473.9) (J32.9)   · Dr Boyd   · Colon cancer screening (V76.51) (Z12.11)   · Contusion of ribs, left, sequela (906.3) (S20.212S)   · Contusion, shoulder and upper arm, multiple sites, left, subsequent encounter  (V58.89,923.09) (S40.012D,S40.022D)   · CVI (common variable immunodeficiency) (279.06) (D83.9)   · Dysphagia (787.20) (R13.10)   · Encounter for  Medicare annual wellness exam (V70.0) (Z00.00)   · Fibroid, uterine (218.9) (D25.9)   · GERD (gastroesophageal reflux disease) (530.81) (K21.9)   · Long term use of drug (V58.69) (Z79.899)   · MCTD (mixed connective tissue disease) (710.8) (M35.1)   · Medicare annual wellness visit, initial (V70.0) (Z00.00)   · Migraine without aura and without status migrainosus, not intractable (346.10) (G43.009)   · Other dysphagia (787.29) (R13.19)   · Overactive bladder (596.51) (N32.81)   · Overweight with body mass index (BMI) of 27 to 27.9 in adult (278.02,V85.23)  (E66.3,Z68.27)   · Palpitations (785.1) (R00.2)   · Parkinson's disease (332.0) (G20)   · Pulmonary nodules (793.19) (R91.8)   · Managed by Dr. Laguna,  Pulmonology   · Raynaud's syndrome (443.0) (I73.00)   · RLS (restless legs syndrome) (333.94) (G25.81)   · Scleroderma (710.1) (M34.9)   · Scoliosis (737.30) (M41.9)   · Senile osteoporosis (733.01) (M81.0)   · Somatic dysfunction of lumbosacral region (739.3) (M99.03)   · Somatic dysfunction of pelvic region (739.5) (M99.05)   · Strain of thoracic region, subsequent encounter (V58.89,847.1) (S29.019D)   · Systemic lupus erythematosus (710.0) (M32.9)   · Thoracic myofascial strain (847.1) (S29.019A)   · Vasomotor instability (780.2) (R55)   · Venous insufficiency (chronic) (peripheral) (459.81) (I87.2)   · Vitamin D deficiency (268.9) (E55.9)     Past Medical History  Problems    · 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 Napier   · History of Bilateral edema of lower extremity (782.3) (R60.0)   · Resolved Date: 19 Dec 2022   · History of COVID-19 virus infection (079.89) (U07.1)   · Resolved Date: 19 Dec 2022   · History of Dysphagia, idiopathic (787.20) (R13.10)   · History of asthma (V12.69) (Z87.09)   · Resolved Date: 05 Jan 2020   · History of hiatal hernia (V12.79) (Z87.19)   · Resolved Date: 26 Dec 2018   · History of hiatal hernia (V12.79) (Z87.19)   ·  Resolved Date: 10 Apr 2023   · History of hyperlipidemia (V12.29) (Z86.39)   · Resolved Date: 26 Dec 2018   · History of hypothyroidism (V12.29) (Z86.39)   · Resolved Date: 26 Dec 2018   · History of malignant neoplasm of skin (V10.83) (Z85.828)   · right shoulder-Dr. Ott   · History of osteoarthritis (V13.4) (Z87.39)   · History of pneumonia due to Pseudomonas (V12.61) (Z87.01)   · Resolved Date: 22 Jun 2020   · History of pneumonia due to Pseudomonas (V12.61) (Z87.01)   · Resolved Date: 21 Jun 2022   · History of screening mammography (V15.89) (Z92.89)   · Mammo 2009   · History of Mycobacterium avium-intracellulare complex (031.2) (A31.0)   · Feb 2015, Dx by bronchoscopic culture, Dr. Whitten   · History of Pap smear for cervical cancer screening (V76.2) (Z12.4)   · PAP 8/2010   · History of Parkinsonism (332.0) (G20)   · Resolved Date: 21 Jun 2022   · History of Raynaud disease (443.0) (I73.00)     Surgical History  Problems    · History of Biopsy Pleural   · History of Complete Colonoscopy   · 2013   · History of Esophagogastric Fundoplasty Nissen Fundoplication   · 2018 June   · Denied: History of Hemorrhoidectomy By Rubber Band Ligation   · History of Nissen fundoplication laparoscopic   · 2019   · History of Right Breast Lumpectomy   fatty tumor tissue was removed ( 2 dense tissues areas in number)   · History of Shoulder Repair   · History of Sinus Surgery   · History of Tonsillectomy   · History of Treatment Of Jaw     Family History  Mother    · Family history of cerebrovascular accident (CVA) (V17.1) (Z82.3)  Father    · Family history of chronic obstructive pulmonary disease (V17.6) (Z82.5)  Brother    · Family history of malignant neoplasm of prostate (V16.42) (Z80.42)   Brother     Social History  Problems    · Employed   · teacher   ·    · Never a smoker   · No alcohol use   · No illicit drug use   · Occasional caffeine consumption     Allergies  Medication    · doxycycline   Adverse Reaction;  burning sension; Dyspepsia; Recorded By: Moraima Bautista; 10/2/2020 11:14:14 AM   · Topamax   Fatigue; Nausea; Palpitations; Recorded By: Miguel Alvarado; 9/28/2020 11:53:17 PM  NonMedication    · No Known Food Allergies   Recorded By: Adrianna Vincent; 3/13/2018 9:29:13 AM        Physical Exam  GENERAL APPEARANCE: No distress, alert, interactive and cooperative.      CARDIOVASCULAR: Regular rate and rhythm. Pulses +2 and equal in all extremities.      MENTAL STATE:   Orientation was normal. Recent and remote memory was intact. Attention span and concentration were normal. Language testing was normal for comprehension and expression. General fund of knowledge was intact.      CRANIAL NERVES:   CN 2    Visual fields full to confrontation.   CN 3, 4, 6   Pupils round, 4 mm in diameter, equally reactive to light. Lids symmetric; no ptosis. EOMs normal alignment, full range with normal saccades and pursuit. No nystagmus.   CN 5   Facial sensation intact bilaterally.   CN 7   Normal and symmetric facial strength. Nasolabial folds symmetric.   CN 8   Hearing intact to voice.   CN 9/10  Palate elevates symmetrically.   CN 11   Normal strength of shoulder shrug.  CN 12   Tongue midline; no fasciculations.      MOTOR:   No noticeable rest tremor today on LUE or jaw, mild tremor RUE. No head tremor.  Mild fine postural tremor of RUE.   Tone is normal throughout.  Mild RUE>LUE bradykinesia of finger taps, hand movements  Normal toe and heel tapping bilat      R L  Deltoids 5 5  Biceps 4+ 4+  Triceps 4+ 4+  interossei 5 5  Finger ext 5 5  Handgrip 5 5     Hip Flex 5 5  Knee Flex 5 5  Knee Ext 5 5  Dorsiflex 5 5  Plantarflex 5 5     REFLEXES:    R L  BR: 2 2  Biceps: 3 3  Pec 2 2  Triceps: 2 2  Knee: 2 2  Ankle: 2 1     Plantars: R down, L down     SENSORY:   In both upper and lower extremities, sensation was intact to light touch     COORDINATION:   In both upper extremities, finger-nose-finger was intact without dysmetria or  overshoot. Normal heel-to-shin.     GAIT:   Stands without pushing. Stooped with anterocollis. Reduced R>L arm swing. Stride is fair. Negative Romberg. + retropulsion - unable to move feet; upper body fell toward writer needing to be caught      UPDRS Examination   Time of exam: 15:00. The patient is currently on medication. Medication given: Pramipexole.   Stimulator State: N/A   Speech: 0.5 Facial Expression: 0.5   Rest Tremor: Head: 0 RUE: 0.5 LUE: 0 RLE: 0 LLE: 0   Action Tremor: RUE: 0.5 LUE: 0   Rigidity: Neck: 0 RUE: 0 LUE: 0 RLE: 0 LLE: 0   Finger Taps: RUE: 1 LUE: 0.5   Hand Movements: RUE: 1 LUE: 0.5   Rapid Alternating Movements: RUE: 0 LUE: 0   Leg Agility: RLE: 0   Arising from chair: 0 Posture: 2 Gait: 1 Postural Stability: 2 Body Bradykinsia: 1   Deisy and Yahr Stage:     Provider Impressions     63 yo right handed female with hx of TMJ surgery 20 years ago, CVID on IVIG replacement, recurrent chest infection (MAC and pseudomonas), and MCTD on plaquenil who presents with 6-month hx of rest tremor of the jaw and both hands. Admitted to dream enactment, constipation, and poor balance. Her exam reveals very subtle parkinsonism with constant jaw rest tremor, intermittent rest tremor of BUE, very subtle bradykinesia on the right side, stooping, retropulsion, and decreased arm swing. Most likely idiopathic tremor predominant PD but given her significant PMH we will obtain a brain MRI WWO to rule out secondary parkinsonism. We will start her on a small dose of mirapex to see if it can help her tremor. Follow up after one month.      12/07/2020: MRI came back normal except for incidental pontine capillary telengectasia. she responded well to mirapex without side effects. exam slightly better.      06/07/2021: has some tremor in the morning and when nervous. Has achy legs at night that keeps her from sleeping. no side effects from mirapex. will increase dose to 0.5 mg and add a night time dose for RLS.       12/06/2021: has some tremor in the morning especially chin quivering. His RLS is still bothersome and keeps her from sleeping. no side effects from mirapex but peripheral visual illusions are becoming more frequent. will increase night-time dose.      6/7/2022: Taking pramipexole 0.25mg TID then 1mg qHS (different from Rx'd in our system). Tremor only when anxious, tolerating pramipexole. Did start gambling after starting pramipexole but thinks it's under control. Lost total $300. No excessive shopping. Has peripheral visual illusions, stable, once a day. Exercise 3 times a week. Starting to golf. Sleep is very poor â€“ 1 to 2 hours per night with significant daytime sleepiness. Has orthostatic symptoms. will keep pramipexole at the same dose while carefully watching her online gambling habits (she will keep a diary of losses). will add remeron for insomnia and will instate mechanical measures for orthostatic hypotension.      12/06/2022: remeron helped her sleep but she stopped shortly after starting due to excessive weight gain. She has been taking one tablet of mirapex 0.5 mg in the morning and one tablet of mirapex 1 mg at night. Her tremor is under good control except when she first wakes up in the morning. she continues with online gambling but only plays free games and has not lost any money. she no longer has visual hallucinations. will adjust her mirapex dosing.     07/17/2023: tremor still under good control. struggles with more stooping and low back pain (the latter is chronic from known lumbar disc disease). Doing more online gambling lately (not free) but hasn't been losing money. balance is getting worse. had at least four recent falls during which she injured herself. wording finding difficulty is more prominent. will hold off on further mirapex increase and we may need to switch her to sinemet soon if she she starts losing money gambling. will refer her to PT and medical spine.    1/15/2024: tremor  still under good control except when stressed. . struggles with more stooping and low back pain (the latter is chronic from known lumbar disc disease). Doing online gambling (not free) but hasn't been losing money. Word finding difficulty getting worse. Exercising. Will keep mirapex the same.     9/16/2024: tremor still under good control except when stressed and early in the morning. struggles with more stooping and low back pain (the latter is chronic from known lumbar disc disease). Doing online gambling and is getting more frequent since the last time we met. Staying up to 2 AM doing online gambling and only sleeps for four hours. Memory getting worse. Complains of food getting stuck in her chest with pain and frequent gurgling and burping. Will be seeing GI in January. Will start sinemet and plan to wean her off mirapex after that to avoid worsening of impulse control disorder. Will refer her for a swallow evaluation.     2/17/2025: She was having severe vomiting due to her hiatal hernia so was never able to take the sinemet I prescribed last time. She even stopped mirapex because she was keeping nothing down (her online gambling improved after that). She was eventually found to have incarcerated paraesophageal hernia and had to have emergency surgery. She improved after that although some of the burping is now coming back. She just started sinemet two weeks ago and is already feeling a good response from it. Her PD symptoms have been mild and tolerable. No side effects from sinemet. Will continue the same.      PLAN:  I'm glad you tolerated carbidopa/levodopa well and that it helped your symptoms to some degree. Please continue carbidopa/levodopa 25/100 mg: next week start taking one tablet three times daily at 8 AM, 12 PM, and 4 PM.     I'm glad you were able to come off pramipexole without issues and that you have noticed improvement in online gambling after you stopped this medication.     I'm glad you got  the hiatal hernia fixed and that you are now following with GI.     Please restart fast paced exercise preferably cycling.     Follow up after six to eight months.      The impression and plan were discussed with the patient and their family (if present) in detail and all questions answered. They agreed to the plan as outlined above.     I spent 20 minutes with this patient. Greater than 50% of this time was spent in counseling and/or coordination of care.     Candido Will MD, PhD   of Neurology  [facility] [employer]  Director of Neuroimmunology and staff neurologist at the [facility]  [facility]

## 2025-02-17 NOTE — PATIENT INSTRUCTIONS
I'm glad you tolerated carbidopa/levodopa well and that it helped your symptoms to some degree. Please continue carbidopa/levodopa 25/100 mg: next week start taking one tablet three times daily at 8 AM, 12 PM, and 4 PM.     I'm glad you were able to come off pramipexole without issues and that you have noticed improvement in online gambling after you stopped this medication.     I'm glad you got the hiatal hernia fixed and that you are now following with GI.     Please restart fast paced exercise preferably cycling.     Follow up after six to eight months.     Thank you for visiting the clinic today    Candido Will MD

## 2025-02-17 NOTE — PATIENT INSTRUCTIONS
You were seen today to review your upper GI and discuss your most recent symptoms of burping, abdominal tenderness, regurgitation, and coughing.   Please follow up with any other testing / labs as ordered   Please call our office at [phone] with any questions

## 2025-02-18 ENCOUNTER — HOSPITAL ENCOUNTER (OUTPATIENT)
Dept: RADIOLOGY | Facility: HOSPITAL | Age: 70
Discharge: HOME | End: 2025-02-18
Payer: MEDICARE

## 2025-02-18 DIAGNOSIS — R14.2 BELCHING: ICD-10-CM

## 2025-02-18 DIAGNOSIS — K21.9 GASTROESOPHAGEAL REFLUX DISEASE WITHOUT ESOPHAGITIS: ICD-10-CM

## 2025-02-18 DIAGNOSIS — K21.9 PARAESOPHAGEAL HERNIA WITH GASTROESOPHAGEAL REFLUX: ICD-10-CM

## 2025-02-18 DIAGNOSIS — K44.9 PARAESOPHAGEAL HERNIA WITH GASTROESOPHAGEAL REFLUX: ICD-10-CM

## 2025-02-18 DIAGNOSIS — K44.9 HIATAL HERNIA: ICD-10-CM

## 2025-02-18 DIAGNOSIS — R11.10 REGURGITATION OF FOOD: ICD-10-CM

## 2025-02-18 PROCEDURE — 74240 X-RAY XM UPR GI TRC 1CNTRST: CPT

## 2025-02-18 PROCEDURE — 2500000005 HC RX 250 GENERAL PHARMACY W/O HCPCS: Performed by: STUDENT IN AN ORGANIZED HEALTH CARE EDUCATION/TRAINING PROGRAM

## 2025-02-18 PROCEDURE — A9698 NON-RAD CONTRAST MATERIALNOC: HCPCS | Performed by: STUDENT IN AN ORGANIZED HEALTH CARE EDUCATION/TRAINING PROGRAM

## 2025-02-18 RX ADMIN — BARIUM SULFATE 100 ML: 980 POWDER, FOR SUSPENSION ORAL at 11:51

## 2025-02-18 RX ADMIN — BARIUM SULFATE 135 ML: 960 POWDER, FOR SUSPENSION ORAL at 11:50

## 2025-02-19 ENCOUNTER — HOSPITAL ENCOUNTER (OUTPATIENT)
Dept: RADIOLOGY | Facility: CLINIC | Age: 70
Discharge: HOME | End: 2025-02-19
Payer: MEDICARE

## 2025-02-19 DIAGNOSIS — R07.9 CHEST PAIN, UNSPECIFIED: ICD-10-CM

## 2025-02-19 PROCEDURE — 71046 X-RAY EXAM CHEST 2 VIEWS: CPT

## 2025-02-21 ENCOUNTER — OFFICE VISIT (OUTPATIENT)
Dept: SURGERY | Facility: CLINIC | Age: 70
End: 2025-02-21
Payer: MEDICARE

## 2025-02-21 VITALS
BODY MASS INDEX: 26.66 KG/M2 | SYSTOLIC BLOOD PRESSURE: 122 MMHG | OXYGEN SATURATION: 98 % | WEIGHT: 160 LBS | DIASTOLIC BLOOD PRESSURE: 69 MMHG | HEIGHT: 65 IN | HEART RATE: 64 BPM

## 2025-02-21 DIAGNOSIS — R10.812 LEFT UPPER QUADRANT ABDOMINAL TENDERNESS, REBOUND TENDERNESS PRESENCE NOT SPECIFIED: ICD-10-CM

## 2025-02-21 DIAGNOSIS — R14.2 BURPING: ICD-10-CM

## 2025-02-21 DIAGNOSIS — R11.10 REGURGITATION OF FOOD: ICD-10-CM

## 2025-02-21 DIAGNOSIS — R05.9 COUGH, UNSPECIFIED TYPE: ICD-10-CM

## 2025-02-21 DIAGNOSIS — Z87.19 HISTORY OF REPAIR OF HIATAL HERNIA: Primary | ICD-10-CM

## 2025-02-21 DIAGNOSIS — Z98.890 HISTORY OF REPAIR OF HIATAL HERNIA: Primary | ICD-10-CM

## 2025-02-21 PROCEDURE — 99211 OFF/OP EST MAY X REQ PHY/QHP: CPT | Performed by: SURGERY

## 2025-02-21 PROCEDURE — 3008F BODY MASS INDEX DOCD: CPT | Performed by: SURGERY

## 2025-02-21 PROCEDURE — 1157F ADVNC CARE PLAN IN RCRD: CPT | Performed by: SURGERY

## 2025-02-21 PROCEDURE — 1159F MED LIST DOCD IN RCRD: CPT | Performed by: SURGERY

## 2025-02-21 PROCEDURE — 1126F AMNT PAIN NOTED NONE PRSNT: CPT | Performed by: SURGERY

## 2025-02-21 RX ORDER — PROPRANOLOL HYDROCHLORIDE 80 MG/1
1 CAPSULE, EXTENDED RELEASE ORAL
COMMUNITY
Start: 2025-02-12

## 2025-02-21 ASSESSMENT — PAIN SCALES - GENERAL: PAINLEVEL_OUTOF10: 0-NO PAIN

## 2025-03-05 ENCOUNTER — APPOINTMENT (OUTPATIENT)
Dept: SURGERY | Facility: CLINIC | Age: 70
End: 2025-03-05
Payer: MEDICARE

## 2025-03-31 NOTE — PROGRESS NOTES
Pulmonary clinic note  Established patient comes with exacerbation of bronchiectasis. I have independently interviewed and examined the patient in the office and reviewed available records.    Physician HPI (4/28/2025):  Ms. 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  Her cough is severe enough that sometimes associated with vomiting   She still has residual reflux symptoms   She had past history positive cultures and sensitivity, Pseudomonas and  M Gordonii she was was maintained on tobramycin inhaler for the last 2 months.  She received multiple broad-spectrum antibiotic (including Augmentin and azithromycin )recently she was previously referred to infectious disease specialist Dr. Alex who prescribed for here clarithromycin and extended course of ciprofloxacin,    She denies any joint pain or skin rash she have not received antibiotic for the last 2 months    ROS:  Symptoms of reflux   Vomitng with sever cough  Back pain      CVID  Parkinson diseases  Mixed CT disease      Immunization History:  Immunization History   Administered Date(s) Administered    COVID-19, mRNA, LNP-S, PF, 30 mcg/0.3 mL dose 08/20/2021    Influenza, Seasonal, Quadrivalent, Adjuvanted 12/01/2021, 12/02/2022, 10/04/2023    Influenza, Unspecified 11/05/2018, 12/01/2021, 11/23/2022    Influenza,  seasonal, injectable 10/15/2018, 11/23/2022    Influenza, trivalent, adjuvanted 10/07/2020, 09/04/2024    Meningococcal ACWY-D (Menactra) 4-valent conjugate vaccine 02/21/2018    Pfizer Purple Cap SARS-CoV-2 02/25/2021, 03/18/2021    Pneumococcal conjugate vaccine, 20-valent (PREVNAR 20) 10/01/2023       Family History:  Family History   Problem Relation Name Age of Onset    Other (cerebrovascular accident) Mother      COPD Father      Prostate cancer Brother         Social History:  Social History     Socioeconomic History    Marital status:     Highest education level: Master's degree (e.g., MA, MS, Karolyn, MEd, MSW, ALEJANDRO)   Tobacco Use    Smoking status: Never     Passive exposure: Never    Smokeless tobacco: Never   Vaping Use    Vaping status: Never Used   Substance and Sexual Activity    Alcohol use: Not Currently    Drug use: Never     Social Drivers of Health     Financial Resource Strain: Patient Unable To Answer (11/20/2024)    Overall Financial Resource Strain (CARDIA)     Difficulty of Paying Living Expenses: Patient unable to answer   Food Insecurity: Patient Unable To Answer (11/20/2024)    Hunger Vital Sign     Worried About Running Out of Food in the Last Year: Patient unable to answer     Ran Out of Food in the Last Year: Patient unable to answer   Transportation Needs: Patient Unable To Answer (11/20/2024)    PRAPARE - Transportation     Lack of Transportation (Medical): Patient unable to answer     Lack of Transportation (Non-Medical): Patient unable to answer   Intimate Partner Violence: Patient Unable To Answer (11/20/2024)    Humiliation, Afraid, Rape, and Kick questionnaire     Fear of Current or Ex-Partner: Patient unable to answer     Emotionally Abused: Patient unable to answer     Physically Abused: Patient unable to answer     Sexually Abused: Patient unable to answer   Housing Stability: Patient Unable To Answer (11/20/2024)    Housing Stability Vital Sign     Unable to Pay for  "Housing in the Last Year: Patient unable to answer     Number of Times Moved in the Last Year: 1     Homeless in the Last Year: Patient unable to answer       Current Medications:  Current Outpatient Medications   Medication Instructions    albuterol (ProAir HFA) 90 mcg/actuation inhaler 2 puffs, inhalation, Every 4 hours PRN    albuterol 90 mcg/actuation inhaler 2 puffs, Every 4 hours PRN    amoxicillin-clavulanate (Augmentin) 500-125 mg tablet 1 tablet, oral, 3 times daily    benzonatate (TESSALON) 100 mg, oral, 3 times daily PRN, Do not crush or chew.    carbidopa-levodopa (Sinemet)  mg tablet 1 tablet, oral, 3 times daily, Start with half a tablet three times daily for one week then go to one full tablet after that.    ibuprofen 600 mg, oral, Every 8 hours PRN    immune globulin, human, (Gammagard Liquid) infusion Infuse monthly as directed    omeprazole (PRILOSEC) 40 mg, oral, Daily, Do not crush or chew.    propranolol LA (Inderal LA) 80 mg 24 hr capsule 1 capsule, Daily (0630)    rizatriptan (Maxalt) 10 mg tablet One po at onset of migraine; can repeat in 2 hrs;  max 20mg daily        Drug Allergies/Intolerances:  Allergies   Allergen Reactions    Doxycycline Nausea/vomiting    Prednisone Other     Red and hot when taking prednisone    Topiramate Palpitations and Nausea/vomiting        Review of Systems: Reflux symptoms back pain   all other review of systems are negative and/or non-contributory.    Physical Examination:  /69   Pulse 59   Resp 18   Ht 1.676 m (5' 6\")   Wt 72.6 kg (160 lb)   BMI 25.82 kg/m²      General: ambulated independently; no acute distress;  HEENT: normocephalic; anicteric sclerae; conjunctivae not injected; nasal mucosa was unremarkable; oropharynx was clear without evidence of thrush; dentition was good.  Neck: supple; no lymphadenopathy or thyromegaly.  Chest: clear to auscultation bilaterally; no chest wall deformity.  Cardiac: regular rhythm; no gallop or " murmur.  Abdomen: soft; non-tender; non-distended; no hepatosplenomegaly.  Extremities: no leg edema; no digital clubbing; 2+ pulses  Psychiatric: did not appear depressed or anxious.    Chest Radiograph     XR chest 2 views 02/19/2025    Narrative  Interpreted By:  Mansoor Talbert,  STUDY:  XR CHEST 2 VIEWS;  2/19/2025 5:11 pm    INDICATION:  Signs/Symptoms:PAIN.    ,R07.9 Chest pain, unspecified    COMPARISON:  11/20/2024    ACCESSION NUMBER(S):  [id number]    ORDERING CLINICIAN:  AARTI MOYER    FINDINGS:          CARDIOMEDIASTINAL SILHOUETTE:  Cardiomediastinal silhouette is normal in size and configuration.    LUNGS:  The lungs are hyperinflated but clear. No consolidation or effusions    ABDOMEN:  No remarkable upper abdominal findings.    BONES:  No acute osseous changes.    Impression  Hyperinflated lungs. No evidence of acute cardiopulmonary process.      MACRO:  None    Signed by: Mansoor Talbert 2/20/2025 8:27 PM  Dictation workstation:   [id number]      Echocardiogram     No results found for this or any previous visit from the past 365 days.       Chest CT Scan     CT chest wo IV contrast   7/19/2024  Status: Final result     PACS Images     Show images for CT chest wo IV contrast  Signed by    Signed Time Phone Pager   Mansoor Talbert MD 7/20/2024 14:51 [phone]     Exam Information    Status Exam Begun Exam Ended   Final 7/19/2024 08:15 7/19/2024 08:27     Study Result    Narrative & Impression   Interpreted By:  Mansoor Talbert,   STUDY:  CT CHEST WO IV CONTRAST;  7/19/2024 8:27 am      INDICATION:  Signs/Symptoms:abnormal CT chest with tree on bud.      COMPARISON:  04/12/2023      ACCESSION NUMBER(S):  [id number]      ORDERING CLINICIAN:  TIFFANIE VAZ      TECHNIQUE:  Helical data acquisition of the chest was obtained  without IV  contrast material.  Images were reformatted in axial, coronal, and  sagittal planes.      FINDINGS:  LUNGS AND AIRWAYS:  The trachea and  central airways are patent. No endobronchial lesion.      Redemonstration of mucous plugging in the right middle lobe with the  are mild areas of centrilobular nodularity and tree-in-bud opacities  in the right lower lobe and the lingula, overall similar to the  prior. This is more confluent in the lingula on slice 209, similar to  the prior. Few calcified granulomas are present well. No new  suspicious nodules seen. No dense consolidation. There is no effusion.      MEDIASTINUM AND NEENA, LOWER NECK AND AXILLA:  The visualized thyroid gland is within normal limits.      No evidence of thoracic lymphadenopathy by CT criteria.      There is a large hiatal hernia. Esophagus appears within normal  limits as seen.      HEART AND VESSELS:  The thoracic aorta is of normal course and caliber without vascular  calcifications.      Main pulmonary artery and its branches are normal in caliber.      No coronary artery calcifications are seen. The study is not  optimized for evaluation of coronary arteries.      The cardiac chambers are not enlarged.      No evidence of pericardial effusion.      UPPER ABDOMEN:  The visualized subdiaphragmatic structures demonstrate no remarkable  findings.      CHEST WALL AND OSSEOUS STRUCTURES:  There are no suspicious osseous lesions. Mild multilevel degenerative  changes are present      IMPRESSION:  Redemonstration of centrilobular nodules and tree-in-bud opacities in  the lingula and the right lower lobe as well as areas of mucous  plugging in the right middle lobe, unchanged from the prior. Trans  compatible with underlying infectious/inflammatory process. No new  suspicious nodules seen.      MACRO:  None      Signed by: Mansoor Talbert 7/20/2024 2:51 PM  Dictation workstation:   [id number]          Co-morbidities Problem List    Assessment and Plan / Recommendations:  Problem List Items Addressed This Visit    None  Visit Diagnoses         Bronchiectasis with acute exacerbation  (Multi)    -  Primary    Relevant Medications    amoxicillin-clavulanate (Augmentin) 500-125 mg tablet    albuterol (ProAir HFA) 90 mcg/actuation inhaler    benzonatate (Tessalon) 100 mg capsule    Other Relevant Orders    Respiratory Culture/Smear    AFB Culture/Smear    Miscellaneous DME      Pulmonary nodule        Relevant Orders    CT chest wo IV contrast           Bronchiectasis  Acute exacerbation  With history of repeated exacerbation  History of positive Cultures for Pseudomonas also gordonii bronchielis in the past  CT chest 2024 had tree-in-bud appearance  Previously referred to Dr. Alex ID specialist, received multiple courses of Cipro and Augmentin inhaled tobramycin  Cough and expectoration persistent, no recent sputum culture  Pulmonary symptoms are secondary for repeated bronchiectasis exacerbation  Repeat culture and sensitivity repeat AFB in the sputum  Will prescribe Augmentin 3 times daily  Consider adding antipseudomonal antibiotic if cultures positive for Pseudomonas     GERD and hiatal hernia:  Status post 2 surgical repair 6 years ago and recent ly in 11/2024 status post incarceration  Mild residual symptoms of reflux but persistent cough    Pulmonary nodule:  Follow up CT chest     Mixed connective tissue disease:  Seen by rheumatology no evidence of active connective tissue disease  Hydroxychloroquine was stopped     Common variable immunodeficiency disease on regular IVIG  Low Ig M     Parkinson disease  Migraine  Overactive bladder  Neck pain     I have independently interviewed and examined the patient in the office and reviewed available records.    Mary Duenas MD  04/28/2025

## 2025-04-09 NOTE — PROGRESS NOTES
Subjective   Patient ID: [id number]   Oneyda Parmar is a 69 y.o. female with MCTD? OP, OA, vitamin D def, Raynaud's, GERD, CVID, DL, venous insufficiency, chronic sinusitis, depression, migraine, scoliosis, RLS, Parkinson's, bronchiectasis, and pulmonary nodules, who presents or follow up     Current rheum meds:  - None    Previous rheum meds:  -  mg daily, used to be BID stopped due to interaction with her antibiotics   -  mg every day (being tapered down), stopped in 10/24    HPI   Saw General surgery for dysphagia, Will do an EGD with endoflip for further evaluation  Will also consider empiric dilation with Savory or Barnett dilators . May need to send her back to her surgeon for consideration of reversal of Nissen since all her problems have been ongoing since    Saw pulm Dr. Duenas, recommended hiatal surgery repair due to aspiration pneumonias, cleared for hiatal surgery, on inhalers   ED visit in November for nausea and vomiting, work up negative, given sx tx  Admitted in November for acute incarcerated hernia, underwent reduction of incarcerated hiatal hernia and cruroplasty as well as takedown of her Nissen fundoplication, doing well post op  Saw neurology Dr. Will, increased meds for PD     + dry mouth, thinks it is related to PD meds     Saw ID before for chronic bronchiectasis and chronic lung colonization and/or infection in the setting of CVID -> Cipro 750 twice daily and clarithromycin 500 daily for 6 weeks, hold nifedipine and Plaquenil   The patient is doing otherwise doing well  Reports some swelling and pain in her ankles, mainly when she wears shoes, wearing compression stockings for her varicose veins   No morning stiffness  No episodes of eye inflammation  No sicca sx  No chest pain or dyspnea  No rashes    ROS:  As per HPI     Rheum hx:  Her previous rheumatologist Dr. Coto moved to [facility]  Diagnosed as MCTD a 5 years ago due to Raynaud's, KISHA 1:40, can't remember other sx at  that time  Cold triggers it, fingers and toes turn white then purple then red, painful, lasts at least half an hour  Nifedipine doesn't help  She reports no improvement with the meds she has been on for the past 5 years  She has a current lung infection with 2 different organisms including pseudomonas, she was referred to ID, will be seeing Dr. Alex soon. She is currently on inhaled tobramycin   She also had a mycobacterial infection previously and was treated with antibiotics for 2 years   Bilateral ankle pain and swelling, left more than the right, recently started  Reports mechanical back pain  Hand pains, with trigger fingers     + dry mouth, uses cough drops  + dysphagia reports is related to her Parkinson's disease  + dyspnea and cough from her pneumonia     PSH: Breast lumpectomy, gastric fundoplication, sinus surgery, tonsillectomy, shoulder repair   Allergies: Per EMR   Habits: No tobacco, no alcohol, no drugs   Social hx: , no kids, retired,    FHx: No family history of autoimmune diseases     Patient Active Problem List   Diagnosis    Abdominal bloating    Arthritis    Atrophic vaginitis    Back pain, chronic    Chronic low back pain    Bilateral edema of lower extremity    Borderline hyperlipidemia    Bronchiectasis without acute exacerbation (Multi)    Cervical somatic dysfunction    Chronic constipation    Chronic sinusitis    Contusion of ribs, left, sequela    Chronic cough    Contusion, shoulder and upper arm, multiple sites, left, subsequent encounter    CVID (common variable immunodeficiency)    Fibroid, uterine    Dysphagia    MCTD (mixed connective tissue disease) (Multi)    Migraine without aura and without status migrainosus, not intractable    Overactive bladder    Palpitations    Pulmonary nodules    Raynaud's syndrome    RLS (restless legs syndrome)    Scleroderma (Multi)    Scoliosis    Senile osteoporosis    Somatic dysfunction of lumbosacral region    Somatic  dysfunction of pelvic region    Strain of thoracic region    Systemic lupus erythematosus (Multi)    Thoracic myofascial strain    Vasomotor instability    Venous insufficiency (chronic) (peripheral)    Vitamin D deficiency    Medicare annual wellness visit, subsequent    Parkinson's disease    Major depressive disorder, recurrent, mild (CMS-HCC)    Major depressive disorder, recurrent, moderate    Major depressive disorder, recurrent, unspecified    Abnormal CT of the chest    Atypical mycobacterial disease    Postural kyphosis of cervicothoracic region    PONV (postoperative nausea and vomiting)    Pneumonia of right lung due to Pseudomonas species (Multi)    Aspiration into airway    Tremors of nervous system    Nausea and vomiting    History of repair of hiatal hernia      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)     Anti-pneumococcal polysaccharide antibody deficiency    COVID-19 09/21/2022    COVID-19 virus infection    COVID-19 virus infection 06/21/2023    Dysphagia, unspecified     Dysphagia, idiopathic    Encounter for screening for malignant neoplasm of cervix     Pap smear for cervical cancer screening    Gastroesophageal reflux disease 06/21/2023    Hiatal hernia 06/21/2023    Localized edema 05/04/2022    Bilateral edema of lower extremity    Paraesophageal hernia with gastroesophageal reflux 10/23/2024    Parkinson's disease 06/21/2022    Parkinsonism    Personal history of other diseases of the digestive system 07/18/2018    History of hiatal hernia    Personal history of other diseases of the musculoskeletal system and connective tissue     History of osteoarthritis    Personal history of other diseases of the respiratory system 10/08/2017    History of asthma    Personal history of other endocrine, nutritional and metabolic disease 08/08/2016    History of hyperlipidemia    Personal history of other  endocrine, nutritional and metabolic disease 10/08/2017    History of hypothyroidism    Personal history of other malignant neoplasm of skin     History of malignant neoplasm of skin    Personal history of other medical treatment     History of screening mammography    Personal history of pneumonia (recurrent) 03/18/2020    History of pneumonia due to Pseudomonas    Personal history of pneumonia (recurrent) 01/29/2021    History of pneumonia due to Pseudomonas    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    Raynaud's syndrome without gangrene     Raynaud disease     Past Surgical History:   Procedure Laterality Date    BREAST LUMPECTOMY  06/23/2017    Right Breast Lumpectomy    COLONOSCOPY  07/29/2015    Complete Colonoscopy    GASTRIC FUNDOPLICATION  07/19/2018    Esophagogastric Fundoplasty Nissen Fundoplication    NISSEN FUNDOPLICATION N/A     Nissen fundoplication laparoscopic    OTHER SURGICAL HISTORY  02/19/2015    Treatment Of Jaw    PARAESOPHAGEAL HERNIA REPAIR N/A 11/2024    PLEURA BIOPSY  06/23/2017    Biopsy Pleural    SHOULDER ARTHROSCOPY      Shoulder Repair    SINUS SURGERY  06/23/2017    Sinus Surgery    TONSILLECTOMY  06/23/2017    Tonsillectomy     Social History     Socioeconomic History    Marital status:      Spouse name: Not on file    Number of children: Not on file    Years of education: Not on file    Highest education level: Master's degree (e.g., MA, MS, Karolyn, MEd, MSW, ALEJANDRO)   Occupational History    Not on file   Tobacco Use    Smoking status: Never     Passive exposure: Never    Smokeless tobacco: Never   Vaping Use    Vaping status: Never Used   Substance and Sexual Activity    Alcohol use: Not Currently    Drug use: Never    Sexual activity: Not on file   Other Topics Concern    Not on file   Social History Narrative    Not on file     Social Drivers of Health     Financial Resource Strain: Patient Unable To Answer (11/20/2024)     Overall Financial Resource Strain (CARDIA)     Difficulty of Paying Living Expenses: Patient unable to answer   Food Insecurity: Patient Unable To Answer (11/20/2024)    Hunger Vital Sign     Worried About Running Out of Food in the Last Year: Patient unable to answer     Ran Out of Food in the Last Year: Patient unable to answer   Transportation Needs: Patient Unable To Answer (11/20/2024)    PRAPARE - Transportation     Lack of Transportation (Medical): Patient unable to answer     Lack of Transportation (Non-Medical): Patient unable to answer   Physical Activity: Not on file   Stress: Not on file   Social Connections: Not on file   Intimate Partner Violence: Patient Unable To Answer (11/20/2024)    Humiliation, Afraid, Rape, and Kick questionnaire     Fear of Current or Ex-Partner: Patient unable to answer     Emotionally Abused: Patient unable to answer     Physically Abused: Patient unable to answer     Sexually Abused: Patient unable to answer   Housing Stability: Patient Unable To Answer (11/20/2024)    Housing Stability Vital Sign     Unable to Pay for Housing in the Last Year: Patient unable to answer     Number of Times Moved in the Last Year: 1     Homeless in the Last Year: Patient unable to answer      Allergies   Allergen Reactions    Doxycycline Nausea/vomiting    Prednisone Other     Red and hot when taking prednisone    Topiramate Palpitations and Nausea/vomiting     Current Outpatient Medications:     albuterol 90 mcg/actuation inhaler, Inhale 2 puffs every 4 hours if needed., Disp: , Rfl:     carbidopa-levodopa (Sinemet)  mg tablet, Take 1 tablet by mouth 3 times a day. Start with half a tablet three times daily for one week then go to one full tablet after that., Disp: 90 tablet, Rfl: 6    ibuprofen 600 mg tablet, Take 1 tablet (600 mg) by mouth every 8 hours if needed for mild pain (1 - 3)., Disp: 90 tablet, Rfl: 5    immune globulin, human, (Gammagard Liquid) infusion, Infuse monthly as  directed, Disp: , Rfl:     omeprazole (PriLOSEC) 40 mg DR capsule, Take 1 capsule (40 mg) by mouth once daily. Do not crush or chew., Disp: 90 capsule, Rfl: 2    propranolol LA (Inderal LA) 80 mg 24 hr capsule, Take 1 capsule (80 mg) by mouth early in the morning.., Disp: , Rfl:     rizatriptan (Maxalt) 10 mg tablet, One po at onset of migraine; can repeat in 2 hrs;  max 20mg daily, Disp: 27 tablet, Rfl: 2     Objective   Visit Vitals  /83   Pulse 88   Temp 36.2 °C (97.1 °F)   Resp 20   Wt 72.6 kg (160 lb)   BMI 26.63 kg/m²   OB Status Postmenopausal   Smoking Status Never   BSA 1.82 m²     Physical Exam:  General: AAOx3, Cooperative  Head: normocephalic, atraumatic, no hair loss   Eyes: EOMI, conjunctiva clear, sclera white, anicteric  Ears: hearing intact  Nose: no deformity, no crusting   Throat/Mouth: No oral deformities, no cheek swelling, mucosa appear moist, no oral ulcers noted or loss of dentition   Skin: No rashes, ulcers or photosensitive areas  MSK: Upper Extremities:  Hand/Fingers: Heberden's and America's nodes. No erythema, edema, tenderness or warmth at DIP, PIP, or MCP joints, FROM grossly. Good hand . No nodules  Wrists: No erythema, edema, warmth or tenderness at wrist, FROM grossly  Elbows: No tenderness, edema, erythema or warmth at elbows, FROM grossly. No nodules   Shoulders: No edema, erythema, tenderness or warmth at shoulders. FROM  Lower Extremities:   Hips: No obvious deformities. No joint tenderness, normal ROM grossly. No trochanteric bursae TTP  Knees: No tenderness, deformities, edema, rashes, or warmth, normal ROM grossly. No crepitus, no pes anserine bursa TTP   Ankles: TTP around the malleoli, no pitting edema  Spine: No spinal tenderness to palpation. No SI joint tenderness    Lab Results   Component Value Date    WBC 7.8 11/24/2024    HGB 12.0 11/24/2024    HCT 36.9 11/24/2024    MCV 92 11/24/2024     11/24/2024        Chemistry    Lab Results   Component Value  Date/Time     (L) 11/24/2024 0735    K 3.8 11/24/2024 0735    CL 99 11/24/2024 0735    CO2 22 11/24/2024 0735    BUN 11 11/24/2024 0735    CREATININE 0.71 11/24/2024 0735    Lab Results   Component Value Date/Time    CALCIUM 9.1 11/24/2024 0735    ALKPHOS 109 11/24/2024 0735    AST 24 11/24/2024 0735    ALT 20 11/24/2024 0735    BILITOT 0.6 11/24/2024 0735        Lab Results   Component Value Date    CRP 0.14 10/17/2024      Lab Results   Component Value Date    KISHA Positive (A) 05/29/2024    RF <10 05/29/2024    SEDRATE 14 10/17/2024      Lab Results   Component Value Date    NEUTROABS 5.86 11/24/2024     Lab Results   Component Value Date    ALT 20 11/24/2024    AST 24 11/24/2024    ALKPHOS 109 11/24/2024    BILITOT 0.6 11/24/2024     Lab Results   Component Value Date    CALCIUM 9.1 11/24/2024    PHOS 3.2 11/21/2024     XR chest 2 views  Narrative: Interpreted By:  Mansoor Talbert,   STUDY:  XR CHEST 2 VIEWS;  2/19/2025 5:11 pm      INDICATION:  Signs/Symptoms:PAIN.      ,R07.9 Chest pain, unspecified      COMPARISON:  11/20/2024      ACCESSION NUMBER(S):  [id number]      ORDERING CLINICIAN:  AARTI MOYER      FINDINGS:                  CARDIOMEDIASTINAL SILHOUETTE:  Cardiomediastinal silhouette is normal in size and configuration.      LUNGS:  The lungs are hyperinflated but clear. No consolidation or effusions      ABDOMEN:  No remarkable upper abdominal findings.      BONES:  No acute osseous changes.      Impression: Hyperinflated lungs. No evidence of acute cardiopulmonary process.          MACRO:  None      Signed by: Mansoor Talbert 2/20/2025 8:27 PM  Dictation workstation:   [id number]     === 04/06/24 ===  XR CHEST 1 VIEW  Left lower lobe infiltrate.      === 02/12/24 ===  DEXA BONE DENSITY: Normal      Assessment/Plan    This is a 69 y.o. female with MCTD? OP, OA, vitamin D def, Raynaud's, GERD, CVID, Parkinson's, bronchiectasis, and pulmonary nodules, who presents for follow up  Last  seen in 10/24    The patient has a previous dx of MCTD with Dr. Coto, reports having Raynaud's since [facility], but was diagnosed with MCTD around 5 years ago. She has been on SSZ and HCQ, without any improvement. She was off HCQ for 2 months (misunderstood her pulm when starting amlodipine and she was already on nifedipine, thought she was supposed to stop the HCQ, not nifedipine). Reports no worsening with being off of it for 2 months. Patient has RP and dry mouth, no other sx suggestive of CTD, no IA, no synovitis on exam. She has pitting edema in both ankles. Work up only positive for KISHA, pt also has thyroid disease. Will taper her off the meds and see    Labs:  4/25 (outside labs with immunologist): CBC, ALT, ESR, Cr, Reuben wnl.   IgM low   11/24: ALC 0.92, rest of CBC, CMP wnl  10/24: CBC, CMP, ESR, CRP, C3, C4 wnl  5/24: CBC, CMP, ESR, CRP, C3, C4, UA, Uprt wnl. Vitamin D 26  KISHA 1:320, EKATERINA, RF, CCP neg  4/24: WBC 4K, ALC 1.2, rest of CBC, CMP wnl  2/24: ESR, CRP wnl  3/22: ESR, CBC wnl  ANCAs, KISHA 1:40, no EKATERINA panel    Imaging:   Xray ankles: OA    # KISHA positive  # CVID  # GERD   # Bronchiectasis  # Vitamin D def  # Hiatal hernia     - Keep monitoring for sx of CTD   - Continue vitamin D 1000 international units daily  - Keep off SSZ  - Keep off HCQ  - Follow up with general surgery post hiatal hernia surgery  - Blood tests before her next nagi     RTC in 6 months     Plan, including risks and benefits, was discussed with the patient, informed on how to reach us.     To schedule an appointment, call [phone]  To reach the rheumatology office, call [phone]    Carly Metzger MD      Division of Rheumatology  [facility]

## 2025-04-22 ENCOUNTER — APPOINTMENT (OUTPATIENT)
Dept: RHEUMATOLOGY | Facility: CLINIC | Age: 70
End: 2025-04-22
Payer: MEDICARE

## 2025-04-22 VITALS
HEART RATE: 88 BPM | BODY MASS INDEX: 26.63 KG/M2 | WEIGHT: 160 LBS | DIASTOLIC BLOOD PRESSURE: 83 MMHG | RESPIRATION RATE: 20 BRPM | SYSTOLIC BLOOD PRESSURE: 138 MMHG | TEMPERATURE: 97.1 F

## 2025-04-22 DIAGNOSIS — K44.9 HIATAL HERNIA: ICD-10-CM

## 2025-04-22 DIAGNOSIS — E55.9 VITAMIN D DEFICIENCY: ICD-10-CM

## 2025-04-22 DIAGNOSIS — D83.9 CVID (COMMON VARIABLE IMMUNODEFICIENCY): ICD-10-CM

## 2025-04-22 DIAGNOSIS — M35.1 MCTD (MIXED CONNECTIVE TISSUE DISEASE) (MULTI): Primary | ICD-10-CM

## 2025-04-22 DIAGNOSIS — Z79.899 ON SULFASALAZINE THERAPY: ICD-10-CM

## 2025-04-22 PROCEDURE — G2211 COMPLEX E/M VISIT ADD ON: HCPCS | Performed by: STUDENT IN AN ORGANIZED HEALTH CARE EDUCATION/TRAINING PROGRAM

## 2025-04-22 PROCEDURE — 99214 OFFICE O/P EST MOD 30 MIN: CPT | Performed by: STUDENT IN AN ORGANIZED HEALTH CARE EDUCATION/TRAINING PROGRAM

## 2025-04-22 PROCEDURE — 1160F RVW MEDS BY RX/DR IN RCRD: CPT | Performed by: STUDENT IN AN ORGANIZED HEALTH CARE EDUCATION/TRAINING PROGRAM

## 2025-04-22 PROCEDURE — 1159F MED LIST DOCD IN RCRD: CPT | Performed by: STUDENT IN AN ORGANIZED HEALTH CARE EDUCATION/TRAINING PROGRAM

## 2025-04-22 PROCEDURE — 1157F ADVNC CARE PLAN IN RCRD: CPT | Performed by: STUDENT IN AN ORGANIZED HEALTH CARE EDUCATION/TRAINING PROGRAM

## 2025-04-23 ENCOUNTER — TELEPHONE (OUTPATIENT)
Dept: PRIMARY CARE | Facility: CLINIC | Age: 70
End: 2025-04-23
Payer: MEDICARE

## 2025-04-23 NOTE — TELEPHONE ENCOUNTER
Oneyda called asking who you would recommend as a spine doctor.  She is having a lot of back pain.

## 2025-04-25 DIAGNOSIS — G89.29 CHRONIC BILATERAL LOW BACK PAIN WITHOUT SCIATICA: Primary | ICD-10-CM

## 2025-04-25 DIAGNOSIS — S76.011D STRAIN OF FLEXOR MUSCLE OF RIGHT HIP, SUBSEQUENT ENCOUNTER: ICD-10-CM

## 2025-04-25 DIAGNOSIS — M54.50 CHRONIC BILATERAL LOW BACK PAIN WITHOUT SCIATICA: Primary | ICD-10-CM

## 2025-04-28 ENCOUNTER — APPOINTMENT (OUTPATIENT)
Dept: PULMONOLOGY | Facility: CLINIC | Age: 70
End: 2025-04-28
Payer: MEDICARE

## 2025-04-28 VITALS
BODY MASS INDEX: 25.71 KG/M2 | DIASTOLIC BLOOD PRESSURE: 69 MMHG | WEIGHT: 160 LBS | HEIGHT: 66 IN | RESPIRATION RATE: 18 BRPM | SYSTOLIC BLOOD PRESSURE: 124 MMHG | HEART RATE: 59 BPM

## 2025-04-28 DIAGNOSIS — R91.1 PULMONARY NODULE: ICD-10-CM

## 2025-04-28 DIAGNOSIS — J47.1 BRONCHIECTASIS WITH ACUTE EXACERBATION (MULTI): Primary | ICD-10-CM

## 2025-04-28 DIAGNOSIS — M35.1 MIXED CONNECTIVE TISSUE DISEASE (MULTI): ICD-10-CM

## 2025-04-28 DIAGNOSIS — D83.9 CVID (COMMON VARIABLE IMMUNODEFICIENCY): ICD-10-CM

## 2025-04-28 DIAGNOSIS — K21.00 GASTROESOPHAGEAL REFLUX DISEASE WITH ESOPHAGITIS, UNSPECIFIED WHETHER HEMORRHAGE: ICD-10-CM

## 2025-04-28 PROCEDURE — 1159F MED LIST DOCD IN RCRD: CPT | Performed by: INTERNAL MEDICINE

## 2025-04-28 PROCEDURE — G2211 COMPLEX E/M VISIT ADD ON: HCPCS | Performed by: INTERNAL MEDICINE

## 2025-04-28 PROCEDURE — 1157F ADVNC CARE PLAN IN RCRD: CPT | Performed by: INTERNAL MEDICINE

## 2025-04-28 PROCEDURE — 3008F BODY MASS INDEX DOCD: CPT | Performed by: INTERNAL MEDICINE

## 2025-04-28 PROCEDURE — 1036F TOBACCO NON-USER: CPT | Performed by: INTERNAL MEDICINE

## 2025-04-28 PROCEDURE — 99215 OFFICE O/P EST HI 40 MIN: CPT | Performed by: INTERNAL MEDICINE

## 2025-04-28 PROCEDURE — G8433 SCR FOR DEP NOT CPT DOC RSN: HCPCS | Performed by: INTERNAL MEDICINE

## 2025-04-28 RX ORDER — BENZONATATE 100 MG/1
100 CAPSULE ORAL 3 TIMES DAILY PRN
Qty: 30 CAPSULE | Refills: 11 | Status: SHIPPED | OUTPATIENT
Start: 2025-04-28 | End: 2025-08-26

## 2025-04-28 RX ORDER — ALBUTEROL SULFATE 90 UG/1
2 INHALANT RESPIRATORY (INHALATION) EVERY 4 HOURS PRN
Qty: 8.5 G | Refills: 11 | Status: SHIPPED | OUTPATIENT
Start: 2025-04-28 | End: 2026-04-28

## 2025-04-28 RX ORDER — AMOXICILLIN AND CLAVULANATE POTASSIUM 500; 125 MG/1; MG/1
1 TABLET, FILM COATED ORAL 3 TIMES DAILY
Qty: 42 TABLET | Refills: 0 | Status: SHIPPED | OUTPATIENT
Start: 2025-04-28 | End: 2025-05-12

## 2025-04-28 ASSESSMENT — ENCOUNTER SYMPTOMS
OCCASIONAL FEELINGS OF UNSTEADINESS: 0
DEPRESSION: 0
LOSS OF SENSATION IN FEET: 0

## 2025-05-07 ENCOUNTER — APPOINTMENT (OUTPATIENT)
Dept: ORTHOPEDIC SURGERY | Facility: CLINIC | Age: 70
End: 2025-05-07
Payer: MEDICARE

## 2025-05-10 DIAGNOSIS — G43.009 MIGRAINE WITHOUT AURA, NOT INTRACTABLE, WITHOUT STATUS MIGRAINOSUS: ICD-10-CM

## 2025-05-11 RX ORDER — PROPRANOLOL HYDROCHLORIDE 80 MG/1
80 CAPSULE, EXTENDED RELEASE ORAL DAILY
Qty: 90 CAPSULE | Refills: 2 | OUTPATIENT
Start: 2025-05-11

## 2025-05-16 ENCOUNTER — APPOINTMENT (OUTPATIENT)
Dept: NEUROSURGERY | Facility: CLINIC | Age: 70
End: 2025-05-16
Payer: MEDICARE

## 2025-05-16 VITALS
HEART RATE: 60 BPM | BODY MASS INDEX: 26.82 KG/M2 | DIASTOLIC BLOOD PRESSURE: 72 MMHG | WEIGHT: 161 LBS | HEIGHT: 65 IN | SYSTOLIC BLOOD PRESSURE: 126 MMHG

## 2025-05-16 DIAGNOSIS — M54.41 CHRONIC BILATERAL LOW BACK PAIN WITH RIGHT-SIDED SCIATICA: Primary | ICD-10-CM

## 2025-05-16 DIAGNOSIS — M43.16 ANTEROLISTHESIS OF LUMBAR SPINE: ICD-10-CM

## 2025-05-16 DIAGNOSIS — M41.9 SCOLIOSIS OF LUMBAR SPINE, UNSPECIFIED SCOLIOSIS TYPE: ICD-10-CM

## 2025-05-16 DIAGNOSIS — M54.16 LUMBAR RADICULOPATHY: ICD-10-CM

## 2025-05-16 DIAGNOSIS — G89.29 CHRONIC BILATERAL LOW BACK PAIN WITH RIGHT-SIDED SCIATICA: Primary | ICD-10-CM

## 2025-05-16 PROCEDURE — 99204 OFFICE O/P NEW MOD 45 MIN: CPT | Performed by: NURSE PRACTITIONER

## 2025-05-16 PROCEDURE — 1159F MED LIST DOCD IN RCRD: CPT | Performed by: NURSE PRACTITIONER

## 2025-05-16 PROCEDURE — 3008F BODY MASS INDEX DOCD: CPT | Performed by: NURSE PRACTITIONER

## 2025-05-16 PROCEDURE — 1036F TOBACCO NON-USER: CPT | Performed by: NURSE PRACTITIONER

## 2025-05-16 PROCEDURE — 1125F AMNT PAIN NOTED PAIN PRSNT: CPT | Performed by: NURSE PRACTITIONER

## 2025-05-16 ASSESSMENT — PAIN SCALES - GENERAL: PAINLEVEL_OUTOF10: 7

## 2025-05-16 NOTE — PROGRESS NOTES
[facility] [place]  Department of Neurological Surgery  New Patient Visit        It was a pleasure to see Oneyda Parmar on 5/16/2025. She is a 69 y.o. , right-handed female who presents to the [facility] for evaluation of low back and right hip pain.  She has been referred to Dr. Plata by Dr. Budinsky for further evaluation. PMH is significant for Parkinson's, SLE, RLS, scleroderma, mixed connective tissue disease, HLD, dysphagia, paraesophageal hernia repair and depression.     Oneyda Parmar has had symptoms of low back pain and right hip pain that have been progressively worsening despite conservative treatments. She reports low back pain has been progressively worsening over the past 2 years. She was previously evaluated by [facility] 9/2023. She underwent MRI of the lumbar spine 9/27/23 through the [employer] system which at that time demonstrated mild dextroscoliosis, grade 1 retrolisthesis at L2-3 and grade 1 anterolisthesis of L4-5 and L5-S1. No significant central canal stenosis at that time. She has since undergone numerous injections and nerve blocks since that time with little to no relief in her pain. She has also undergone physical therapy with no improvement in her symptoms. She is currently managing her pain with ibuprofen which helps take the edge off. She states hip pain has been progressively worsening over the past few months. She describes back and hip pain as achy, burning and tingling in nature. Bending over and standing back up causes her extreme pain. Prolonged standing, walking, and bending all exacerbate her pain. She endorses intermittent paresthesias of the feet, but denies any paresthesias of the legs. She denies significant radicular leg pain. She does not sleep well and symptoms will sometimes keep her up at night as she is not able to get comfortable. Progression of symptoms  prompted today's visit. She denies any bowel / bladder incontinence or saddle anesthesia. She reports increasing difficulty with her balance and multiple falls, however she is not sure if it is related to her Parkinson's or her spine. Her falls have mainly been due to mis-stepping when she is climbing steps. She reports symptoms are inhibiting her daily life.        PREVIOUS TREATMENTS  PT; 2023  NSAIDs; ibuprofen  Injections: ARIANE, nerve blocks    Previous Spine Surgery: None     Smoker: Denies   Anticoagulation / Antiplatelets: None     ROS: 12 / 12 systems reviewed and are negative unless noted in HPI    ON EXAM:  General: Well developed female, awake/alert/oriented x 3, no distress, alert and cooperative  Skin: Warm and dry, no visible lesions / rashes  ENMT: Mucous membranes moist, no apparent injury  Head/Neck: No apparent injury  Respiratory/Thorax: Normal breathing with good chest expansion, thorax symmetric  Cardiovascular: No pitting edema, no JVD    NEUROLOGICAL EXAM:  EOMI, face symmetric  Motor Strength: 5/5 strength throughout  Range of Motion:  Normal in all extremities  Muscle Tone: Normal without spasticity or contractures in all extremities. Fine tremor noted at rest.   Muscle Bulk: Normal and symmetric in all extremities  Posture:  -- Cervical: Mild kyphosis noted  -- Thoracic: Normal  -- Lumbar : Normal  Paraspinal muscle spasm/tenderness absent.  Tenderness along the lower lumbar spinous processes, and Right SI joint.  Sensation: SILT  Lizet's sign absent      Oneyda Parmar has been seen as a new patient today for low back and right hip pain. Previous MRI of the lumbar spine 9/27/23 through the [employer] system which at that time demonstrated mild dextroscoliosis, grade 1 retrolisthesis at L2-3 and grade 1 anterolisthesis of L4-5 and L5-S1. No significant central canal stenosis at that time. She has undergone numerous conservative treatments without much relief of pain. It is my medical opinion  she should under go MRI of the lumbar spine to assess soft tissues and to evaluate for any nerve compression. Will obtain EOS scoliosis films to evaluate overall global alignment. Will obtain dynamic X-rays of the lumbar spine to rule out any instability. I will follow up with her upon completion of imaging studies with further recommendations at that time. She is understanding and agreeable to this plan.         [facility]-[address] [place], [place] [address]     Phone: [phone]  Fax: [phone]

## 2025-05-17 ENCOUNTER — HOSPITAL ENCOUNTER (OUTPATIENT)
Dept: RADIOLOGY | Facility: HOSPITAL | Age: 70
Discharge: HOME | End: 2025-05-17
Payer: MEDICARE

## 2025-05-17 DIAGNOSIS — M43.16 ANTEROLISTHESIS OF LUMBAR SPINE: ICD-10-CM

## 2025-05-17 DIAGNOSIS — M54.16 LUMBAR RADICULOPATHY: ICD-10-CM

## 2025-05-17 DIAGNOSIS — G89.29 CHRONIC BILATERAL LOW BACK PAIN WITH RIGHT-SIDED SCIATICA: ICD-10-CM

## 2025-05-17 DIAGNOSIS — M54.41 CHRONIC BILATERAL LOW BACK PAIN WITH RIGHT-SIDED SCIATICA: ICD-10-CM

## 2025-05-17 PROCEDURE — 72148 MRI LUMBAR SPINE W/O DYE: CPT

## 2025-05-17 PROCEDURE — 72120 X-RAY BEND ONLY L-S SPINE: CPT

## 2025-05-17 PROCEDURE — 72148 MRI LUMBAR SPINE W/O DYE: CPT | Performed by: RADIOLOGY

## 2025-05-17 PROCEDURE — 72110 X-RAY EXAM L-2 SPINE 4/>VWS: CPT | Performed by: RADIOLOGY

## 2025-05-21 ENCOUNTER — HOSPITAL ENCOUNTER (OUTPATIENT)
Dept: RADIOLOGY | Facility: HOSPITAL | Age: 70
Discharge: HOME | End: 2025-05-21
Payer: MEDICARE

## 2025-05-21 DIAGNOSIS — M41.9 SCOLIOSIS OF LUMBAR SPINE, UNSPECIFIED SCOLIOSIS TYPE: ICD-10-CM

## 2025-05-21 PROCEDURE — 72082 X-RAY EXAM ENTIRE SPI 2/3 VW: CPT

## 2025-05-22 DIAGNOSIS — M79.604 LOW BACK PAIN RADIATING TO BOTH LEGS: Primary | ICD-10-CM

## 2025-05-22 DIAGNOSIS — M54.50 LOW BACK PAIN RADIATING TO BOTH LEGS: Primary | ICD-10-CM

## 2025-05-22 DIAGNOSIS — M79.605 LOW BACK PAIN RADIATING TO BOTH LEGS: Primary | ICD-10-CM

## 2025-05-22 RX ORDER — MELOXICAM 7.5 MG/1
7.5 TABLET ORAL DAILY
Qty: 30 TABLET | Refills: 0 | Status: SHIPPED | OUTPATIENT
Start: 2025-05-22 | End: 2025-06-21

## 2025-05-22 RX ORDER — METHOCARBAMOL 500 MG/1
500 TABLET, FILM COATED ORAL 3 TIMES DAILY PRN
Qty: 60 TABLET | Refills: 0 | Status: SHIPPED | OUTPATIENT
Start: 2025-05-22 | End: 2025-06-21

## 2025-05-22 NOTE — PROGRESS NOTES
Prescriptions for Robaxin and Mobic provided.       [facility]  [address]  [address]  [place] [address]    [address] [place] [address]  [address]  [place] [address]     Phone: [phone]  Fax: [phone]

## 2025-06-03 ENCOUNTER — OFFICE VISIT (OUTPATIENT)
Dept: ORTHOPEDIC SURGERY | Facility: CLINIC | Age: 70
End: 2025-06-03
Payer: MEDICARE

## 2025-06-03 DIAGNOSIS — G89.29 CHRONIC LOW BACK PAIN WITHOUT SCIATICA, UNSPECIFIED BACK PAIN LATERALITY: Primary | ICD-10-CM

## 2025-06-03 DIAGNOSIS — M41.9 KYPHOSCOLIOSIS: ICD-10-CM

## 2025-06-03 DIAGNOSIS — M54.50 CHRONIC LOW BACK PAIN WITHOUT SCIATICA, UNSPECIFIED BACK PAIN LATERALITY: Primary | ICD-10-CM

## 2025-06-03 PROCEDURE — 99202 OFFICE O/P NEW SF 15 MIN: CPT | Performed by: ORTHOPAEDIC SURGERY

## 2025-06-03 PROCEDURE — 99203 OFFICE O/P NEW LOW 30 MIN: CPT | Performed by: ORTHOPAEDIC SURGERY

## 2025-06-03 PROCEDURE — 1125F AMNT PAIN NOTED PAIN PRSNT: CPT | Performed by: ORTHOPAEDIC SURGERY

## 2025-06-03 ASSESSMENT — PAIN - FUNCTIONAL ASSESSMENT: PAIN_FUNCTIONAL_ASSESSMENT: 0-10

## 2025-06-03 ASSESSMENT — PAIN SCALES - GENERAL: PAINLEVEL_OUTOF10: 8

## 2025-06-03 NOTE — LETTER
Amanda 3, 2025     Miguel Alvarado DO  [address] [address]  [facility] [address]    Patient: Oneyda Parmar   YOB: 1955   Date of Visit: 6/3/2025       Dear Dr. Miguel Alvarado DO:    Thank you for referring Oneyda Parmar to me for evaluation. Below are my notes for this consultation.  If you have questions, please do not hesitate to call me. I look forward to following your patient along with you.       Sincerely,     Mike Beth MD      CC: No Recipients  ______________________________________________________________________________________    HPI:Oneyda Parmar is a very pleasant 6-year-old woman who comes in today with complaints of chronic low back pain.  She reports some occasional symptoms into her right buttock with some burning sensation in the buttock.  No significant claudication symptoms.  She has had physical therapy and steroid injections.  She comes in for second opinion in regards to potential spine surgery.      ROS:  Reviewed on EMR and patient intake sheet.    PMH/SH:  Reviewed on EMR and patient intake sheet.    Exam:  Physical Exam    Constitutional: Well appearing; no acute distress  Eyes: pupils are equal and round  Psych: normal affect  Respiratory: non-labored breathing  Cardiovascular: regular rate and rhythm  GI: non-distended abdomen  Musculoskeletal: no pain with range of motion of the hips bilaterally  Neurologic: [5]/5 strength in the lower extremities bilaterally]; [negative] straight leg raise    Radiology:     Standing x-rays demonstrate acceptable overall sagittal and coronal alignment.  She does have increased thoracic kyphosis and a degenerative lumbar scoliosis.  Multilevel spondylosis is noted.  MRI does not, however, demonstrate any significant central canal stenosis and/or neural compression.    Diagnosis:    Chronic low back pain; kyphoscoliosis    Assessment and Plan:   69-year-old woman with chronic low back pain in the setting of a kyphoscoliotic  deformity.  At this time she does not have significant neurologic symptoms and no significant stenosis.  As a result, I would not recommend extensive deformity surgery.  I have recommended continued nonoperative management with focusing on core strengthening, and follow-up with her pain management physician.  I can see her back as needed.    At the end of the visit, I asked the patient if there were any further questions.  Although no further questions were provided at this time, I would be happy to see the patient back in the future to discuss any further questions or concerns.      Mike Beth MD    Chief of Spine Surgery, [facility]  Director of Spine Service, [facility]  , Department of Orthopaedics  [facility] [employer]  [address] [place] [address]  P: [phone]  [email or web address]    This note was dictated with voice recognition software.  It has not been proofread for grammatical errors, typographical mistakes or other semantic inconsistencies.

## 2025-06-03 NOTE — PROGRESS NOTES
HPI:Oneyda Parmar is a very pleasant 6-year-old woman who comes in today with complaints of chronic low back pain.  She reports some occasional symptoms into her right buttock with some burning sensation in the buttock.  No significant claudication symptoms.  She has had physical therapy and steroid injections.  She comes in for second opinion in regards to potential spine surgery.      ROS:  Reviewed on EMR and patient intake sheet.    PMH/SH:  Reviewed on EMR and patient intake sheet.    Exam:  Physical Exam    Constitutional: Well appearing; no acute distress  Eyes: pupils are equal and round  Psych: normal affect  Respiratory: non-labored breathing  Cardiovascular: regular rate and rhythm  GI: non-distended abdomen  Musculoskeletal: no pain with range of motion of the hips bilaterally  Neurologic: [5]/5 strength in the lower extremities bilaterally]; [negative] straight leg raise    Radiology:     Standing x-rays demonstrate acceptable overall sagittal and coronal alignment.  She does have increased thoracic kyphosis and a degenerative lumbar scoliosis.  Multilevel spondylosis is noted.  MRI does not, however, demonstrate any significant central canal stenosis and/or neural compression.    Diagnosis:    Chronic low back pain; kyphoscoliosis    Assessment and Plan:   69-year-old woman with chronic low back pain in the setting of a kyphoscoliotic deformity.  At this time she does not have significant neurologic symptoms and no significant stenosis.  As a result, I would not recommend extensive deformity surgery.  I have recommended continued nonoperative management with focusing on core strengthening, and follow-up with her pain management physician.  I can see her back as needed.    At the end of the visit, I asked the patient if there were any further questions.  Although no further questions were provided at this time, I would be happy to see the patient back in the future to discuss any further questions or  concerns.      Mike Beth MD    Chief of Spine Surgery, [facility]  Director of Spine Service, [facility]  , Department of Orthopaedics  [facility]  [address] [place] [address]  P: [phone]  [email or web address]    This note was dictated with voice recognition software.  It has not been proofread for grammatical errors, typographical mistakes or other semantic inconsistencies.

## 2025-06-09 ENCOUNTER — TELEPHONE (OUTPATIENT)
Dept: NEUROSURGERY | Facility: CLINIC | Age: 70
End: 2025-06-09
Payer: MEDICARE

## 2025-06-09 DIAGNOSIS — M79.604 LOW BACK PAIN RADIATING TO BOTH LEGS: ICD-10-CM

## 2025-06-09 DIAGNOSIS — M54.50 LOW BACK PAIN RADIATING TO BOTH LEGS: ICD-10-CM

## 2025-06-09 DIAGNOSIS — M79.605 LOW BACK PAIN RADIATING TO BOTH LEGS: ICD-10-CM

## 2025-06-09 RX ORDER — MELOXICAM 15 MG/1
15 TABLET ORAL DAILY
Qty: 30 TABLET | Refills: 0 | Status: SHIPPED | OUTPATIENT
Start: 2025-06-09 | End: 2025-07-09

## 2025-06-10 ENCOUNTER — OFFICE VISIT (OUTPATIENT)
Age: 70
End: 2025-06-10
Payer: MEDICARE

## 2025-06-10 VITALS
TEMPERATURE: 97.5 F | WEIGHT: 164 LBS | BODY MASS INDEX: 27.32 KG/M2 | DIASTOLIC BLOOD PRESSURE: 68 MMHG | OXYGEN SATURATION: 99 % | SYSTOLIC BLOOD PRESSURE: 116 MMHG | HEART RATE: 71 BPM | HEIGHT: 65 IN

## 2025-06-10 DIAGNOSIS — M47.816 LUMBAR SPONDYLOSIS: ICD-10-CM

## 2025-06-10 DIAGNOSIS — M54.16 LUMBAR RADICULOPATHY: Primary | ICD-10-CM

## 2025-06-10 DIAGNOSIS — M48.061 LUMBAR FORAMINAL STENOSIS: ICD-10-CM

## 2025-06-10 DIAGNOSIS — M54.50 CHRONIC LOW BACK PAIN, UNSPECIFIED BACK PAIN LATERALITY, UNSPECIFIED WHETHER SCIATICA PRESENT: ICD-10-CM

## 2025-06-10 DIAGNOSIS — G89.29 CHRONIC LOW BACK PAIN, UNSPECIFIED BACK PAIN LATERALITY, UNSPECIFIED WHETHER SCIATICA PRESENT: ICD-10-CM

## 2025-06-10 PROCEDURE — 1125F AMNT PAIN NOTED PAIN PRSNT: CPT | Performed by: PAIN MEDICINE

## 2025-06-10 PROCEDURE — G8399 PT W/DXA RESULTS DOCUMENT: HCPCS | Performed by: PAIN MEDICINE

## 2025-06-10 PROCEDURE — G8419 CALC BMI OUT NRM PARAM NOF/U: HCPCS | Performed by: PAIN MEDICINE

## 2025-06-10 PROCEDURE — G8427 DOCREV CUR MEDS BY ELIG CLIN: HCPCS | Performed by: PAIN MEDICINE

## 2025-06-10 PROCEDURE — 1036F TOBACCO NON-USER: CPT | Performed by: PAIN MEDICINE

## 2025-06-10 PROCEDURE — 1090F PRES/ABSN URINE INCON ASSESS: CPT | Performed by: PAIN MEDICINE

## 2025-06-10 PROCEDURE — 99213 OFFICE O/P EST LOW 20 MIN: CPT | Performed by: PAIN MEDICINE

## 2025-06-10 PROCEDURE — 1123F ACP DISCUSS/DSCN MKR DOCD: CPT | Performed by: PAIN MEDICINE

## 2025-06-10 PROCEDURE — 99214 OFFICE O/P EST MOD 30 MIN: CPT | Performed by: PAIN MEDICINE

## 2025-06-10 PROCEDURE — 1159F MED LIST DOCD IN RCRD: CPT | Performed by: PAIN MEDICINE

## 2025-06-10 PROCEDURE — 3017F COLORECTAL CA SCREEN DOC REV: CPT | Performed by: PAIN MEDICINE

## 2025-06-10 RX ORDER — OMEPRAZOLE 40 MG/1
40 CAPSULE, DELAYED RELEASE ORAL DAILY
COMMUNITY
Start: 2025-01-20 | End: 2025-10-17

## 2025-06-10 RX ORDER — METHOCARBAMOL 500 MG/1
500 TABLET, FILM COATED ORAL 3 TIMES DAILY PRN
COMMUNITY
Start: 2025-05-22 | End: 2025-06-21

## 2025-06-10 RX ORDER — ALBUTEROL SULFATE 90 UG/1
2 INHALANT RESPIRATORY (INHALATION) EVERY 4 HOURS PRN
COMMUNITY
Start: 2024-07-11 | End: 2026-04-28

## 2025-06-10 RX ORDER — MELOXICAM 15 MG/1
15 TABLET ORAL DAILY
COMMUNITY
Start: 2025-06-09

## 2025-06-10 ASSESSMENT — ENCOUNTER SYMPTOMS
BACK PAIN: 1
CONSTIPATION: 0
NAUSEA: 0
SHORTNESS OF BREATH: 0
DIARRHEA: 0

## 2025-06-10 NOTE — PROGRESS NOTES
and Sexual Activity    Alcohol use: No    Drug use: No    Sexual activity: Not Currently   Other Topics Concern    Not on file   Social History Narrative    Not on file     Social Drivers of Health     Financial Resource Strain: Patient Unable To Answer (11/20/2024)    Received from [facility]    Overall Financial Resource Strain (CARDIA)     Difficulty of Paying Living Expenses: Patient unable to answer   Food Insecurity: Patient Unable To Answer (11/20/2024)    Received from [facility]    Hunger Vital Sign     Worried About Running Out of Food in the Last Year: Patient unable to answer     Ran Out of Food in the Last Year: Patient unable to answer   Transportation Needs: Patient Unable To Answer (11/20/2024)    Received from [facility]    PRAPARE - Transportation     Lack of Transportation (Medical): Patient unable to answer     Lack of Transportation (Non-Medical): Patient unable to answer   Physical Activity: Not on file   Stress: Not on file   Social Connections: Not on file   Intimate Partner Violence: Patient Unable To Answer (11/20/2024)    Received from [facility]    Humiliation, Afraid, Rape, and Kick questionnaire     Fear of Current or Ex-Partner: Patient unable to answer     Emotionally Abused: Patient unable to answer     Physically Abused: Patient unable to answer     Sexually Abused: Patient unable to answer   Housing Stability: Patient Unable To Answer (11/20/2024)    Received from [facility]    Housing Stability Vital Sign     Unable to Pay for Housing in the Last Year: Patient unable to answer     Number of Times Moved in the Last Year: 1     Homeless in the Last Year: Patient unable to answer       Current Outpatient Medications on File Prior to Visit   Medication Sig Dispense Refill    albuterol sulfate HFA (PROVENTIL;VENTOLIN;PROAIR) 108 (90 Base) MCG/ACT inhaler Inhale 2 puffs

## 2025-06-12 ENCOUNTER — APPOINTMENT (OUTPATIENT)
Dept: NEUROSURGERY | Facility: CLINIC | Age: 70
End: 2025-06-12
Payer: MEDICARE

## 2025-06-17 ENCOUNTER — TELEPHONE (OUTPATIENT)
Age: 70
End: 2025-06-17

## 2025-06-17 NOTE — TELEPHONE ENCOUNTER
CHECKED ORDERS THAT WERE SENT TO US ON 6/10/25 AND THIS ORDER WAS NEVER SENT TO US.    ORDER PRINTED AND MEDICARE REVIEW IS NEEDED PRIOR TO SCHEDULING

## 2025-06-18 ENCOUNTER — TELEPHONE (OUTPATIENT)
Age: 70
End: 2025-06-18

## 2025-06-18 NOTE — TELEPHONE ENCOUNTER
RIGHT L5-S1 TFESI     NO AUTH REQUIRED     REFERRAL [id number]    OK to schedule procedure approved as above.   Please note sides/levels approved and date range.   (If applicable, sides/levels approved may differ from those ordered)    TO BE SCHEDULED WITH DR. MCCULLOUGH

## 2025-06-25 ENCOUNTER — HOSPITAL ENCOUNTER (OUTPATIENT)
Age: 70
Discharge: HOME | End: 2025-06-25
Payer: MEDICARE

## 2025-06-25 VITALS
SYSTOLIC BLOOD PRESSURE: 110 MMHG | DIASTOLIC BLOOD PRESSURE: 70 MMHG | HEIGHT: 65 IN | HEART RATE: 79 BPM | BODY MASS INDEX: 26.54 KG/M2 | TEMPERATURE: 97.4 F | WEIGHT: 159.3 LBS | OXYGEN SATURATION: 99 %

## 2025-06-25 DIAGNOSIS — R52 PAIN: ICD-10-CM

## 2025-06-25 PROBLEM — M54.16 LUMBAR RADICULOPATHY: Status: ACTIVE | Noted: 2025-06-25

## 2025-06-25 PROCEDURE — 6360000004 HC RX CONTRAST MEDICATION: Performed by: PAIN MEDICINE

## 2025-06-25 PROCEDURE — 64483 NJX AA&/STRD TFRM EPI L/S 1: CPT | Performed by: PAIN MEDICINE

## 2025-06-25 PROCEDURE — 6360000002 HC RX W HCPCS: Performed by: PAIN MEDICINE

## 2025-06-25 PROCEDURE — 2500000003 HC RX 250 WO HCPCS: Performed by: PAIN MEDICINE

## 2025-06-25 RX ORDER — DEXAMETHASONE SODIUM PHOSPHATE 10 MG/ML
5 INJECTION, SOLUTION INTRAMUSCULAR; INTRAVENOUS ONCE
Status: COMPLETED | OUTPATIENT
Start: 2025-06-25 | End: 2025-06-25

## 2025-06-25 RX ORDER — SODIUM CHLORIDE 9 MG/ML
1 INJECTION, SOLUTION INTRAMUSCULAR; INTRAVENOUS; SUBCUTANEOUS ONCE
Status: COMPLETED | OUTPATIENT
Start: 2025-06-25 | End: 2025-06-25

## 2025-06-25 RX ORDER — LIDOCAINE HYDROCHLORIDE 10 MG/ML
2 INJECTION, SOLUTION EPIDURAL; INFILTRATION; INTRACAUDAL; PERINEURAL ONCE
Status: COMPLETED | OUTPATIENT
Start: 2025-06-25 | End: 2025-06-25

## 2025-06-25 RX ADMIN — LIDOCAINE HYDROCHLORIDE 2 ML: 10 INJECTION, SOLUTION EPIDURAL; INFILTRATION; INTRACAUDAL; PERINEURAL at 14:46

## 2025-06-25 RX ADMIN — IOHEXOL 1 ML: 300 INJECTION, SOLUTION INTRAVENOUS at 14:46

## 2025-06-25 RX ADMIN — SODIUM CHLORIDE 1 ML: 9 INJECTION INTRAMUSCULAR; INTRAVENOUS; SUBCUTANEOUS at 14:46

## 2025-06-25 RX ADMIN — DEXAMETHASONE SODIUM PHOSPHATE 5 MG: 10 INJECTION, SOLUTION INTRAMUSCULAR; INTRAVENOUS at 14:45

## 2025-06-25 ASSESSMENT — PAIN SCALES - GENERAL
PAINLEVEL_OUTOF10: 8
PAINLEVEL_OUTOF10: 8

## 2025-06-25 ASSESSMENT — PAIN DESCRIPTION - LOCATION
LOCATION: BACK
LOCATION: BACK

## 2025-06-25 NOTE — PROCEDURES
LUMBAR TRANSFORAMINAL INJECTION      Patient Name: Ynaely Sanford : 1955  Date: 2025   Physician: CLARISSA MCCULLOUGH MD      Yanely Sanford  is here today for interventional pain management.    Preoperatively, the patient presents with radicular pain in the affected area as per history and exam. Patient has failed NSAIDs, PT/HEP, and conservative treatment. Patient has significant psychological and functional impairment due to this condition.  Standard ASI guidelines were followed and sterile technique used.  Area was cleaned with Betadine x3.  Informed consent was obtained.  Fluoroscopic guidance was used for this procedure.     Discussed the risks including but not limited to bleeding, infection, worsened pain, damage to surrounding structures, side effects, toxicity, allergic reactions to medications used, need for surgery, pneumothorax, abdominal and visceral injury, as well as catastrophic injury such as vision loss, paralysis, spinal cord or plexus injury, stroke, bowel or bladder incontinence, chest tube insertion, ventilator dependence, and death. Discussed the risks, benefits, and alternatives to the procedure including no procedure at all. Discussed that we cannot undo any permanent neurologic or orthopaedic damage or change the course of any underlying disease. After thorough discussion, patient expressed understanding and willingness to proceed. Written consent was obtained and is in the chart. Verbal consent to proceed was obtained.    TRANSFORAMINAL EPIDURAL  There was limited spread of contrast in the anterior epidural space and around the exiting nerve root. The 6 o’clock position of the pedicle was identified. Multiple views of fluoroscopy including lateral were used to confirm accurate needle placement of depth. Live fluoroscopy was used when injecting contrast to ensure no subdural or vascular spread. In total, approximately 5 mg of Dexamethasone and 0.5 ml of

## 2025-06-30 ENCOUNTER — OFFICE VISIT (OUTPATIENT)
Dept: PRIMARY CARE | Facility: CLINIC | Age: 70
End: 2025-06-30
Payer: MEDICARE

## 2025-06-30 VITALS
SYSTOLIC BLOOD PRESSURE: 129 MMHG | BODY MASS INDEX: 27.26 KG/M2 | WEIGHT: 163.8 LBS | OXYGEN SATURATION: 94 % | DIASTOLIC BLOOD PRESSURE: 78 MMHG

## 2025-06-30 DIAGNOSIS — R05.3 CHRONIC COUGH: ICD-10-CM

## 2025-06-30 DIAGNOSIS — H61.23 CERUMEN DEBRIS ON TYMPANIC MEMBRANE OF BOTH EARS: ICD-10-CM

## 2025-06-30 DIAGNOSIS — J01.91 ACUTE RECURRENT SINUSITIS, UNSPECIFIED LOCATION: Primary | ICD-10-CM

## 2025-06-30 PROCEDURE — 1160F RVW MEDS BY RX/DR IN RCRD: CPT | Performed by: FAMILY MEDICINE

## 2025-06-30 PROCEDURE — 1036F TOBACCO NON-USER: CPT | Performed by: FAMILY MEDICINE

## 2025-06-30 PROCEDURE — 1159F MED LIST DOCD IN RCRD: CPT | Performed by: FAMILY MEDICINE

## 2025-06-30 PROCEDURE — 99213 OFFICE O/P EST LOW 20 MIN: CPT | Performed by: FAMILY MEDICINE

## 2025-06-30 PROCEDURE — 69210 REMOVE IMPACTED EAR WAX UNI: CPT | Performed by: FAMILY MEDICINE

## 2025-06-30 RX ORDER — CEFDINIR 300 MG/1
300 CAPSULE ORAL 2 TIMES DAILY
Qty: 20 CAPSULE | Refills: 0 | Status: SHIPPED | OUTPATIENT
Start: 2025-06-30 | End: 2025-07-10

## 2025-06-30 RX ORDER — HYDROCODONE BITARTRATE AND HOMATROPINE METHYLBROMIDE ORAL SOLUTION 5; 1.5 MG/5ML; MG/5ML
5 LIQUID ORAL EVERY 6 HOURS PRN
Qty: 100 ML | Refills: 0 | Status: SHIPPED | OUTPATIENT
Start: 2025-06-30 | End: 2025-07-05

## 2025-06-30 NOTE — PROGRESS NOTES
Subjective     Patient ID: [id number]     Oneyda Parmar is a 69 y.o. female who presents for Sinusitis and Facial Pain.    HPI  2 wks, green sputum.  Teeth hurt.  Cough has worsened.  Headache.  No fever, SOB.      Scoliosis, significant lower back pain.  Saw Neurosurgeon and pain management.          Objective   /78 (BP Location: Right arm, Patient Position: Sitting)   Wt 74.3 kg (163 lb 12.8 oz)   SpO2 94%   BMI 27.26 kg/m²    Physical Exam:   Mildly ill appearing, alert and oriented ×3.  Tympanic membranes occluded by cerumen bilaterally   Pharynx with postnasal drip  Neck without adenopathy  Heart regular rate and rhythm without murmur  Lungs with slight wheeze    Ear Cerumen Removal    Date/Time: 6/30/2025 2:48 PM    Performed by: Miguel Alvarado DO  Authorized by: Miguel Alvarado DO    Consent:     Consent obtained:  Verbal    Consent given by:  Patient  Universal protocol:     Patient identity confirmed:  Verbally with patient  Procedure details:     Location:  L ear and R ear    Procedure type: curette      Procedure outcomes: cerumen removed    Post-procedure details:     Procedure completion:  Tolerated well, no immediate complications  Comments:      Debrox Otic instilled in ear canals.  After 5 minutes, ears were irrigated with warm water resulting in evacuation of cerumen.  Remainder of impacted cerumen was removed by provider with instrumentation.  The otoscope was used along with a wax curette. Patient tolerated procedure well.      Assessment/Plan   1. Acute recurrent sinusitis, unspecified location (Primary)  - cefdinir (Omnicef) 300 mg capsule; Take 1 capsule (300 mg) by mouth 2 times a day for 10 days.  Dispense: 20 capsule; Refill: 0  - hydrocodone-homatropine (Hycodan) 5-1.5 mg/5 mL syrup; Take 5 mL by mouth every 6 hours if needed for cough for up to 5 days.  Dispense: 100 mL; Refill: 0    2. Cerumen debris on tympanic membrane of both ears  - Ear Cerumen Removal; Future    3. Chronic  cough        Follow up in August as scheduled for medical management    I will continue to monitor, evaluate, assess and treat all problems/diagnoses as appropriate and continue to collaborate with specialists.    Contact office or send a  MY Chart message with any questions or concerns    Encouraged to sign up with my  My Chart  Patient will only be notified of labs that require medical intervention.    Prescriptions will not be filled unless you are compliant with your follow up appointments or have a follow up appointment scheduled as per instruction of your physician. Refills should be requested at the time of your visit.    **Charting was completed using voice recognition technology and may include unintended errors**    Miguel Alvarado DO, FACOFP  [address]  [place] [address] [phone]    Problem List Items Addressed This Visit    None      Miguel Alvarado DO

## 2025-07-15 ENCOUNTER — OFFICE VISIT (OUTPATIENT)
Age: 70
End: 2025-07-15
Payer: MEDICARE

## 2025-07-15 VITALS
DIASTOLIC BLOOD PRESSURE: 76 MMHG | BODY MASS INDEX: 28.06 KG/M2 | TEMPERATURE: 97.3 F | HEIGHT: 65 IN | WEIGHT: 168.4 LBS | SYSTOLIC BLOOD PRESSURE: 132 MMHG

## 2025-07-15 DIAGNOSIS — M47.816 LUMBAR SPONDYLOSIS: ICD-10-CM

## 2025-07-15 DIAGNOSIS — G89.29 CHRONIC LOW BACK PAIN, UNSPECIFIED BACK PAIN LATERALITY, UNSPECIFIED WHETHER SCIATICA PRESENT: ICD-10-CM

## 2025-07-15 DIAGNOSIS — M40.04 POSTURAL KYPHOSIS OF THORACIC REGION: Primary | ICD-10-CM

## 2025-07-15 DIAGNOSIS — M47.819 FACET HYPERTROPHY: ICD-10-CM

## 2025-07-15 DIAGNOSIS — M54.50 CHRONIC LOW BACK PAIN, UNSPECIFIED BACK PAIN LATERALITY, UNSPECIFIED WHETHER SCIATICA PRESENT: ICD-10-CM

## 2025-07-15 PROCEDURE — 1036F TOBACCO NON-USER: CPT | Performed by: PAIN MEDICINE

## 2025-07-15 PROCEDURE — 1123F ACP DISCUSS/DSCN MKR DOCD: CPT | Performed by: PAIN MEDICINE

## 2025-07-15 PROCEDURE — 3017F COLORECTAL CA SCREEN DOC REV: CPT | Performed by: PAIN MEDICINE

## 2025-07-15 PROCEDURE — G8427 DOCREV CUR MEDS BY ELIG CLIN: HCPCS | Performed by: PAIN MEDICINE

## 2025-07-15 PROCEDURE — 1125F AMNT PAIN NOTED PAIN PRSNT: CPT | Performed by: PAIN MEDICINE

## 2025-07-15 PROCEDURE — 99214 OFFICE O/P EST MOD 30 MIN: CPT | Performed by: PAIN MEDICINE

## 2025-07-15 PROCEDURE — 99213 OFFICE O/P EST LOW 20 MIN: CPT | Performed by: PAIN MEDICINE

## 2025-07-15 PROCEDURE — 1090F PRES/ABSN URINE INCON ASSESS: CPT | Performed by: PAIN MEDICINE

## 2025-07-15 PROCEDURE — G8419 CALC BMI OUT NRM PARAM NOF/U: HCPCS | Performed by: PAIN MEDICINE

## 2025-07-15 PROCEDURE — G8399 PT W/DXA RESULTS DOCUMENT: HCPCS | Performed by: PAIN MEDICINE

## 2025-07-15 PROCEDURE — 1159F MED LIST DOCD IN RCRD: CPT | Performed by: PAIN MEDICINE

## 2025-07-15 ASSESSMENT — ENCOUNTER SYMPTOMS
BACK PAIN: 1
CONSTIPATION: 0
DIARRHEA: 0
NAUSEA: 0
SHORTNESS OF BREATH: 0

## 2025-07-15 NOTE — PROGRESS NOTES
LUMBAR SPINE 5/17/2025:   MRI LUMBAR SPINE WO CONTRAST  Order: [id number]  Impression    The coronal  images demonstrate a dextrocurvature of the lumbar  spine.      There is 3 mm of retrolisthesis of L2 on L3 and L3 on L4. There is 2  mm of retrolisthesis of L4 on L5 and L5 on S1.      There is multilevel spondylosis with varying degrees of spinal canal  and neural foraminal narrowing as described above.      MACRO:  None.      Signed by: Nadeem Dominguez 5/20/2025 3:18 PM  Dictation workstation:   [id number]  Narrative    Interpreted By:  Nadeem Dominguez,  STUDY:  MR LUMBAR SPINE WO IV CONTRAST;  5/17/2025 10:38 am      INDICATION:  Signs/Symptoms:back pain, leg pain.      COMPARISON:  None.      ACCESSION NUMBER(S):  [id number]      ORDERING CLINICIAN:  NANDINI PAGAN      TECHNIQUE:  Sagittal STIR, sagittal T2, sagittal T1, axial T2, axial T1 weighted  MRI images of the lumbar spine were obtained without intravenous  contrast administration.      FINDINGS:  The coronal  images demonstrate a dextrocurvature of the lumbar  spine.      There is 3 mm of retrolisthesis of L2 on L3 and L3 on L4. There is 2  mm of retrolisthesis of L4 on L5 and L5 on S1.      There are degenerative signal changes noted along endplates within  lumbar and visualized lower thoracic region.      There are numerous scattered hemangiomas and/or focal fatty rests.      The visualized spinal cord demonstrates no signal abnormality within  it.  The conus medullaris is normally positioned terminating at the  L1/2 level.      There are scattered perineural cysts/Tarlov cysts within the sacral  spinal canal the largest of which measures approximately 18 mm in  greatest sagittal dimension as seen on sagittal slice 13 of 25.      At the L5/S1 level,  there is 2 mm of retrolisthesis of L5 on S1,  mild posterior osteophytic spurring and posterior disc bulge along  with degenerative facet changes and ligamentum flavum

## 2025-07-17 ENCOUNTER — TELEPHONE (OUTPATIENT)
Age: 70
End: 2025-07-17

## 2025-07-17 NOTE — TELEPHONE ENCOUNTER
Called patient to schedule procedure, patient was driving and could not schedule at the time and requested a call back.

## 2025-07-17 NOTE — TELEPHONE ENCOUNTER
TROY [id number] RFA    AUTH APPROVED FROM 7/26/25-11/23/25  OK to schedule procedure approved as above.   Please note sides/levels approved and date range.   (If applicable, sides/levels approved may differ from those ordered)    TO BE SCHEDULED WITH

## 2025-07-28 ENCOUNTER — HOSPITAL ENCOUNTER (OUTPATIENT)
Dept: RADIOLOGY | Facility: CLINIC | Age: 70
Discharge: HOME | End: 2025-07-28
Payer: MEDICARE

## 2025-07-28 ENCOUNTER — APPOINTMENT (OUTPATIENT)
Facility: CLINIC | Age: 70
End: 2025-07-28
Payer: MEDICARE

## 2025-07-28 DIAGNOSIS — R91.1 PULMONARY NODULE: ICD-10-CM

## 2025-07-28 PROCEDURE — 71250 CT THORAX DX C-: CPT

## 2025-07-28 PROCEDURE — 71250 CT THORAX DX C-: CPT | Performed by: RADIOLOGY

## 2025-08-04 ENCOUNTER — APPOINTMENT (OUTPATIENT)
Dept: PRIMARY CARE | Facility: CLINIC | Age: 70
End: 2025-08-04
Payer: MEDICARE

## 2025-08-06 ENCOUNTER — HOSPITAL ENCOUNTER (OUTPATIENT)
Age: 70
Discharge: HOME | End: 2025-08-06
Payer: MEDICARE

## 2025-08-06 VITALS
HEIGHT: 65 IN | SYSTOLIC BLOOD PRESSURE: 112 MMHG | BODY MASS INDEX: 27.24 KG/M2 | TEMPERATURE: 97 F | WEIGHT: 163.5 LBS | DIASTOLIC BLOOD PRESSURE: 78 MMHG

## 2025-08-06 DIAGNOSIS — M40.04 POSTURAL KYPHOSIS OF THORACIC REGION: ICD-10-CM

## 2025-08-06 DIAGNOSIS — M54.50 CHRONIC LOW BACK PAIN, UNSPECIFIED BACK PAIN LATERALITY, UNSPECIFIED WHETHER SCIATICA PRESENT: Primary | ICD-10-CM

## 2025-08-06 DIAGNOSIS — M47.816 LUMBAR SPONDYLOSIS: ICD-10-CM

## 2025-08-06 DIAGNOSIS — G89.29 CHRONIC LOW BACK PAIN, UNSPECIFIED BACK PAIN LATERALITY, UNSPECIFIED WHETHER SCIATICA PRESENT: Primary | ICD-10-CM

## 2025-08-06 DIAGNOSIS — R52 PAIN: ICD-10-CM

## 2025-08-06 PROCEDURE — 6360000004 HC RX CONTRAST MEDICATION: Performed by: PAIN MEDICINE

## 2025-08-06 PROCEDURE — 6360000002 HC RX W HCPCS: Performed by: PAIN MEDICINE

## 2025-08-06 PROCEDURE — 64636 DESTROY L/S FACET JNT ADDL: CPT

## 2025-08-06 PROCEDURE — 64635 DESTROY LUMB/SAC FACET JNT: CPT

## 2025-08-06 RX ORDER — LIDOCAINE HYDROCHLORIDE 10 MG/ML
5 INJECTION, SOLUTION EPIDURAL; INFILTRATION; INTRACAUDAL; PERINEURAL ONCE
Status: COMPLETED | OUTPATIENT
Start: 2025-08-06 | End: 2025-08-06

## 2025-08-06 RX ORDER — BETAMETHASONE SODIUM PHOSPHATE AND BETAMETHASONE ACETATE 3; 3 MG/ML; MG/ML
6 INJECTION, SUSPENSION INTRA-ARTICULAR; INTRALESIONAL; INTRAMUSCULAR; SOFT TISSUE ONCE
Status: COMPLETED | OUTPATIENT
Start: 2025-08-06 | End: 2025-08-06

## 2025-08-06 RX ADMIN — IOHEXOL 1 ML: 300 INJECTION, SOLUTION INTRAVENOUS at 10:30

## 2025-08-06 RX ADMIN — BETAMETHASONE ACETATE AND BETAMETHASONE SODIUM PHOSPHATE 6 MG: 3; 3 INJECTION, SUSPENSION INTRA-ARTICULAR; INTRALESIONAL; INTRAMUSCULAR; SOFT TISSUE at 10:30

## 2025-08-06 RX ADMIN — LIDOCAINE HYDROCHLORIDE 5 ML: 10 INJECTION, SOLUTION EPIDURAL; INFILTRATION; INTRACAUDAL; PERINEURAL at 10:29

## 2025-08-06 ASSESSMENT — PAIN DESCRIPTION - LOCATION
LOCATION: BACK
LOCATION: BACK

## 2025-08-06 ASSESSMENT — PAIN SCALES - GENERAL
PAINLEVEL_OUTOF10: 7
PAINLEVEL_OUTOF10: 7

## 2025-08-14 ENCOUNTER — APPOINTMENT (OUTPATIENT)
Facility: CLINIC | Age: 70
End: 2025-08-14
Payer: MEDICARE

## 2025-08-18 ENCOUNTER — OFFICE VISIT (OUTPATIENT)
Dept: NEUROLOGY | Facility: HOSPITAL | Age: 70
End: 2025-08-18
Payer: MEDICARE

## 2025-08-18 VITALS
HEART RATE: 79 BPM | WEIGHT: 162 LBS | SYSTOLIC BLOOD PRESSURE: 135 MMHG | BODY MASS INDEX: 26.99 KG/M2 | RESPIRATION RATE: 18 BRPM | HEIGHT: 65 IN | DIASTOLIC BLOOD PRESSURE: 87 MMHG | TEMPERATURE: 96.2 F

## 2025-08-18 DIAGNOSIS — G20.A1 PARKINSON'S DISEASE WITHOUT DYSKINESIA OR FLUCTUATING MANIFESTATIONS: ICD-10-CM

## 2025-08-18 PROCEDURE — 99212 OFFICE O/P EST SF 10 MIN: CPT

## 2025-08-18 PROCEDURE — 1036F TOBACCO NON-USER: CPT | Performed by: PSYCHIATRY & NEUROLOGY

## 2025-08-18 PROCEDURE — 1125F AMNT PAIN NOTED PAIN PRSNT: CPT | Performed by: PSYCHIATRY & NEUROLOGY

## 2025-08-18 PROCEDURE — 99213 OFFICE O/P EST LOW 20 MIN: CPT | Performed by: PSYCHIATRY & NEUROLOGY

## 2025-08-18 PROCEDURE — 3008F BODY MASS INDEX DOCD: CPT | Performed by: PSYCHIATRY & NEUROLOGY

## 2025-08-18 PROCEDURE — 1159F MED LIST DOCD IN RCRD: CPT | Performed by: PSYCHIATRY & NEUROLOGY

## 2025-08-18 RX ORDER — CARBIDOPA AND LEVODOPA 25; 100 MG/1; MG/1
1.5 TABLET ORAL 3 TIMES DAILY
Qty: 405 TABLET | Refills: 3 | Status: SHIPPED | OUTPATIENT
Start: 2025-08-18 | End: 2026-08-18

## 2025-08-18 ASSESSMENT — PAIN SCALES - GENERAL: PAINLEVEL_OUTOF10: 6

## 2025-08-25 ENCOUNTER — APPOINTMENT (OUTPATIENT)
Dept: PRIMARY CARE | Facility: CLINIC | Age: 70
End: 2025-08-25
Payer: MEDICARE

## 2025-08-25 VITALS
WEIGHT: 164.2 LBS | SYSTOLIC BLOOD PRESSURE: 140 MMHG | DIASTOLIC BLOOD PRESSURE: 93 MMHG | HEART RATE: 70 BPM | HEIGHT: 64 IN | BODY MASS INDEX: 28.03 KG/M2

## 2025-08-25 DIAGNOSIS — Z12.31 ENCOUNTER FOR SCREENING MAMMOGRAM FOR MALIGNANT NEOPLASM OF BREAST: ICD-10-CM

## 2025-08-25 DIAGNOSIS — Z13.6 SCREENING FOR HEART DISEASE: ICD-10-CM

## 2025-08-25 DIAGNOSIS — M41.35 THORACOGENIC SCOLIOSIS OF THORACOLUMBAR REGION: ICD-10-CM

## 2025-08-25 DIAGNOSIS — M35.1 MCTD (MIXED CONNECTIVE TISSUE DISEASE) (MULTI): ICD-10-CM

## 2025-08-25 DIAGNOSIS — Z79.899 LONG TERM USE OF DRUG: ICD-10-CM

## 2025-08-25 DIAGNOSIS — E78.5 HYPERLIPIDEMIA, UNSPECIFIED HYPERLIPIDEMIA TYPE: ICD-10-CM

## 2025-08-25 DIAGNOSIS — Z13.29 SCREENING FOR ENDOCRINE DISORDER: ICD-10-CM

## 2025-08-25 DIAGNOSIS — J47.9 BRONCHIECTASIS WITHOUT ACUTE EXACERBATION (MULTI): ICD-10-CM

## 2025-08-25 DIAGNOSIS — R53.83 OTHER FATIGUE: ICD-10-CM

## 2025-08-25 DIAGNOSIS — M47.816 LUMBAR SPONDYLOSIS: ICD-10-CM

## 2025-08-25 DIAGNOSIS — D83.9 CVID (COMMON VARIABLE IMMUNODEFICIENCY): ICD-10-CM

## 2025-08-25 DIAGNOSIS — G89.29 CHRONIC BILATERAL LOW BACK PAIN WITHOUT SCIATICA: Primary | ICD-10-CM

## 2025-08-25 DIAGNOSIS — Z13.0 SCREENING FOR DISORDER OF BLOOD AND BLOOD-FORMING ORGANS: ICD-10-CM

## 2025-08-25 DIAGNOSIS — M35.1 MIXED CONNECTIVE TISSUE DISEASE (MULTI): ICD-10-CM

## 2025-08-25 DIAGNOSIS — Z00.00 MEDICARE ANNUAL WELLNESS VISIT, SUBSEQUENT: ICD-10-CM

## 2025-08-25 DIAGNOSIS — M54.50 CHRONIC BILATERAL LOW BACK PAIN WITHOUT SCIATICA: Primary | ICD-10-CM

## 2025-08-25 DIAGNOSIS — G43.009 MIGRAINE WITHOUT AURA AND WITHOUT STATUS MIGRAINOSUS, NOT INTRACTABLE: ICD-10-CM

## 2025-08-25 DIAGNOSIS — E55.9 VITAMIN D DEFICIENCY: ICD-10-CM

## 2025-08-25 DIAGNOSIS — K21.9 GASTROESOPHAGEAL REFLUX DISEASE WITHOUT ESOPHAGITIS: ICD-10-CM

## 2025-08-25 DIAGNOSIS — Z12.31 BREAST CANCER SCREENING BY MAMMOGRAM: ICD-10-CM

## 2025-08-25 PROCEDURE — 3008F BODY MASS INDEX DOCD: CPT | Performed by: FAMILY MEDICINE

## 2025-08-25 PROCEDURE — 1170F FXNL STATUS ASSESSED: CPT | Performed by: FAMILY MEDICINE

## 2025-08-25 PROCEDURE — G0446 INTENS BEHAVE THER CARDIO DX: HCPCS | Performed by: FAMILY MEDICINE

## 2025-08-25 PROCEDURE — 93000 ELECTROCARDIOGRAM COMPLETE: CPT | Performed by: FAMILY MEDICINE

## 2025-08-25 PROCEDURE — 99497 ADVNCD CARE PLAN 30 MIN: CPT | Performed by: FAMILY MEDICINE

## 2025-08-25 PROCEDURE — G0439 PPPS, SUBSEQ VISIT: HCPCS | Performed by: FAMILY MEDICINE

## 2025-08-25 PROCEDURE — 99215 OFFICE O/P EST HI 40 MIN: CPT | Performed by: FAMILY MEDICINE

## 2025-08-25 PROCEDURE — 1036F TOBACCO NON-USER: CPT | Performed by: FAMILY MEDICINE

## 2025-08-25 PROCEDURE — 1123F ACP DISCUSS/DSCN MKR DOCD: CPT | Performed by: FAMILY MEDICINE

## 2025-08-25 PROCEDURE — 1160F RVW MEDS BY RX/DR IN RCRD: CPT | Performed by: FAMILY MEDICINE

## 2025-08-25 PROCEDURE — 1159F MED LIST DOCD IN RCRD: CPT | Performed by: FAMILY MEDICINE

## 2025-08-25 RX ORDER — RIZATRIPTAN BENZOATE 10 MG/1
TABLET ORAL
Qty: 27 TABLET | Refills: 2 | Status: SHIPPED | OUTPATIENT
Start: 2025-08-25

## 2025-08-25 RX ORDER — IBUPROFEN 600 MG/1
600 TABLET, FILM COATED ORAL EVERY 8 HOURS PRN
Qty: 90 TABLET | Refills: 5 | Status: SHIPPED | OUTPATIENT
Start: 2025-08-25

## 2025-08-25 RX ORDER — OMEPRAZOLE 40 MG/1
40 CAPSULE, DELAYED RELEASE ORAL DAILY
Qty: 90 CAPSULE | Refills: 2 | Status: SHIPPED | OUTPATIENT
Start: 2025-08-25 | End: 2026-05-22

## 2025-08-25 RX ORDER — ROSUVASTATIN CALCIUM 20 MG/1
20 TABLET, COATED ORAL DAILY
Qty: 90 TABLET | Refills: 2 | Status: SHIPPED | OUTPATIENT
Start: 2025-08-25 | End: 2026-05-22

## 2025-08-25 ASSESSMENT — PATIENT HEALTH QUESTIONNAIRE - PHQ9
1. LITTLE INTEREST OR PLEASURE IN DOING THINGS: NOT AT ALL
2. FEELING DOWN, DEPRESSED OR HOPELESS: NOT AT ALL
SUM OF ALL RESPONSES TO PHQ9 QUESTIONS 1 AND 2: 0
1. LITTLE INTEREST OR PLEASURE IN DOING THINGS: NOT AT ALL
SUM OF ALL RESPONSES TO PHQ9 QUESTIONS 1 AND 2: 0
2. FEELING DOWN, DEPRESSED OR HOPELESS: NOT AT ALL

## 2025-08-25 ASSESSMENT — ACTIVITIES OF DAILY LIVING (ADL)
MANAGING_FINANCES: INDEPENDENT
TAKING_MEDICATION: INDEPENDENT
DOING_HOUSEWORK: INDEPENDENT
DRESSING: INDEPENDENT
GROCERY_SHOPPING: INDEPENDENT
BATHING: INDEPENDENT

## 2025-08-27 ENCOUNTER — APPOINTMENT (OUTPATIENT)
Facility: CLINIC | Age: 70
End: 2025-08-27
Payer: MEDICARE

## 2025-08-27 VITALS
BODY MASS INDEX: 27.32 KG/M2 | HEIGHT: 65 IN | TEMPERATURE: 97.1 F | SYSTOLIC BLOOD PRESSURE: 146 MMHG | WEIGHT: 164 LBS | OXYGEN SATURATION: 94 % | DIASTOLIC BLOOD PRESSURE: 83 MMHG | HEART RATE: 68 BPM

## 2025-08-27 DIAGNOSIS — J01.00 SUBACUTE MAXILLARY SINUSITIS: ICD-10-CM

## 2025-08-27 DIAGNOSIS — M35.1 MIXED CONNECTIVE TISSUE DISEASE (MULTI): ICD-10-CM

## 2025-08-27 DIAGNOSIS — J45.41 MODERATE PERSISTENT ASTHMATIC BRONCHITIS WITH ACUTE EXACERBATION (HHS-HCC): ICD-10-CM

## 2025-08-27 DIAGNOSIS — J47.1 BRONCHIECTASIS WITH ACUTE EXACERBATION (MULTI): ICD-10-CM

## 2025-08-27 DIAGNOSIS — J40 BRONCHITIS: Primary | ICD-10-CM

## 2025-08-27 DIAGNOSIS — K21.9 GASTROESOPHAGEAL REFLUX DISEASE WITHOUT ESOPHAGITIS: ICD-10-CM

## 2025-08-27 DIAGNOSIS — D83.9 CVID (COMMON VARIABLE IMMUNODEFICIENCY): ICD-10-CM

## 2025-08-27 PROCEDURE — 99214 OFFICE O/P EST MOD 30 MIN: CPT | Performed by: INTERNAL MEDICINE

## 2025-08-27 PROCEDURE — 1126F AMNT PAIN NOTED NONE PRSNT: CPT | Performed by: INTERNAL MEDICINE

## 2025-08-27 PROCEDURE — 3008F BODY MASS INDEX DOCD: CPT | Performed by: INTERNAL MEDICINE

## 2025-08-27 PROCEDURE — 1036F TOBACCO NON-USER: CPT | Performed by: INTERNAL MEDICINE

## 2025-08-27 PROCEDURE — 1159F MED LIST DOCD IN RCRD: CPT | Performed by: INTERNAL MEDICINE

## 2025-08-27 PROCEDURE — G2211 COMPLEX E/M VISIT ADD ON: HCPCS | Performed by: INTERNAL MEDICINE

## 2025-08-27 RX ORDER — AMOXICILLIN 500 MG/1
500 CAPSULE ORAL EVERY 8 HOURS SCHEDULED
Qty: 30 CAPSULE | Refills: 0 | Status: SHIPPED | OUTPATIENT
Start: 2025-08-27 | End: 2025-09-06

## 2025-08-27 ASSESSMENT — PATIENT HEALTH QUESTIONNAIRE - PHQ9
1. LITTLE INTEREST OR PLEASURE IN DOING THINGS: NOT AT ALL
2. FEELING DOWN, DEPRESSED OR HOPELESS: NOT AT ALL
SUM OF ALL RESPONSES TO PHQ9 QUESTIONS 1 AND 2: 0

## 2025-08-27 ASSESSMENT — PAIN SCALES - GENERAL: PAINLEVEL_OUTOF10: 0-NO PAIN

## 2025-09-04 ENCOUNTER — APPOINTMENT (OUTPATIENT)
Dept: OTOLARYNGOLOGY | Facility: CLINIC | Age: 70
End: 2025-09-04
Payer: MEDICARE

## 2025-09-05 ENCOUNTER — APPOINTMENT (OUTPATIENT)
Dept: NEUROSURGERY | Facility: CLINIC | Age: 70
End: 2025-09-05
Payer: MEDICARE

## 2025-09-05 ENCOUNTER — HOSPITAL ENCOUNTER (OUTPATIENT)
Dept: RADIOLOGY | Facility: HOSPITAL | Age: 70
Discharge: HOME | End: 2025-09-05
Payer: MEDICARE

## 2025-09-05 DIAGNOSIS — J32.0 CHRONIC MAXILLARY SINUSITIS: ICD-10-CM

## 2025-09-05 PROCEDURE — 70486 CT MAXILLOFACIAL W/O DYE: CPT

## 2025-09-10 ENCOUNTER — APPOINTMENT (OUTPATIENT)
Facility: CLINIC | Age: 70
End: 2025-09-10
Payer: MEDICARE

## 2025-09-16 ENCOUNTER — APPOINTMENT (OUTPATIENT)
Dept: OTOLARYNGOLOGY | Facility: CLINIC | Age: 70
End: 2025-09-16
Payer: MEDICARE

## 2025-09-25 ENCOUNTER — APPOINTMENT (OUTPATIENT)
Dept: OTOLARYNGOLOGY | Facility: CLINIC | Age: 70
End: 2025-09-25
Payer: MEDICARE

## 2025-11-24 ENCOUNTER — APPOINTMENT (OUTPATIENT)
Dept: RHEUMATOLOGY | Facility: CLINIC | Age: 70
End: 2025-11-24
Payer: MEDICARE

## 2025-12-01 ENCOUNTER — APPOINTMENT (OUTPATIENT)
Facility: CLINIC | Age: 70
End: 2025-12-01
Payer: MEDICARE

## 2026-03-02 ENCOUNTER — APPOINTMENT (OUTPATIENT)
Dept: PRIMARY CARE | Facility: CLINIC | Age: 71
End: 2026-03-02
Payer: COMMERCIAL

## 2026-08-31 ENCOUNTER — APPOINTMENT (OUTPATIENT)
Dept: PRIMARY CARE | Facility: CLINIC | Age: 71
End: 2026-08-31
Payer: COMMERCIAL

## (undated) DEVICE — TROCAR, OPTICAL, BLADELESS, KII FIOS, 5 X 100MM, THREADED

## (undated) DEVICE — BINDER, ABDOMINAL, 4 PANEL, 12 X 46-62 IN

## (undated) DEVICE — STAPLER, LINEAR ENDO RELOAD, 60MM, BLUE, DISP

## (undated) DEVICE — SLEEVE, VASO PRESS, CALF GARMENT, MEDIUM, GREEN

## (undated) DEVICE — NEEDLE, EPIDURAL 17G X 4 1/2 PERIFIX

## (undated) DEVICE — SYRINGE, 30 CC, LUER LOCK

## (undated) DEVICE — STAPLER, ECHELON 3000, 60MM LONG

## (undated) DEVICE — RESERVOIR, DRAINAGE, WOUND, JACKSON-PRATT, 100 CC, SILICONE

## (undated) DEVICE — SLEEVE, KII, Z-THREAD, 5X100CM

## (undated) DEVICE — APPLICATOR, CHLORAPREP, W/ORANGE TINT, 26ML

## (undated) DEVICE — CARE KIT, LAPAROSCOPIC, ADVANCED

## (undated) DEVICE — IRRIGATOR, HYDRO-SURG PLUS, WITH COJOINED SUCTION AND IRRIGATION

## (undated) DEVICE — TUBE SET, PNEUMOLAR HEATED, SMOKE EVACU, HIGH-FLOW

## (undated) DEVICE — RELOAD, ENDOSTITCH, SURGIDAC, 2-0, 48 IN, GREEN, SULU

## (undated) DEVICE — Device

## (undated) DEVICE — TROCAR, OPTICAL, BLADELESS, 12MM, THREADED, 100MM LENGTH

## (undated) DEVICE — LIGASURE, L-HOOK 44CM SEALER/DIVIDER LAP, MARYLAND

## (undated) DEVICE — KIT, LAP GASTRIC BYPASS, CUSTOM, PARMA

## (undated) DEVICE — DEVICE, SUTURE, ENDOSTITCH, 10 MM

## (undated) DEVICE — NEEDLE, CLOSURE, OMNICLOSE